# Patient Record
Sex: MALE | Race: WHITE | NOT HISPANIC OR LATINO | Employment: OTHER
[De-identification: names, ages, dates, MRNs, and addresses within clinical notes are randomized per-mention and may not be internally consistent; named-entity substitution may affect disease eponyms.]

---

## 2017-02-12 ENCOUNTER — HISTORIC RESULTS (OUTPATIENT)
Dept: ADMINISTRATIVE | Age: 52
End: 2017-02-12

## 2017-02-23 ENCOUNTER — HISTORIC RESULTS (OUTPATIENT)
Dept: ADMINISTRATIVE | Age: 52
End: 2017-02-23

## 2019-11-20 ENCOUNTER — OFFICE VISIT (OUTPATIENT)
Dept: ORTHOPEDICS | Facility: CLINIC | Age: 54
End: 2019-11-20
Payer: COMMERCIAL

## 2019-11-20 ENCOUNTER — ANCILLARY PROCEDURE (OUTPATIENT)
Dept: GENERAL RADIOLOGY | Facility: CLINIC | Age: 54
End: 2019-11-20
Attending: PREVENTIVE MEDICINE
Payer: COMMERCIAL

## 2019-11-20 ENCOUNTER — TELEPHONE (OUTPATIENT)
Dept: ORTHOPEDICS | Facility: CLINIC | Age: 54
End: 2019-11-20

## 2019-11-20 VITALS
SYSTOLIC BLOOD PRESSURE: 135 MMHG | WEIGHT: 315 LBS | HEART RATE: 52 BPM | BODY MASS INDEX: 44.1 KG/M2 | HEIGHT: 71 IN | DIASTOLIC BLOOD PRESSURE: 92 MMHG

## 2019-11-20 DIAGNOSIS — E66.01 MORBID OBESITY (H): ICD-10-CM

## 2019-11-20 DIAGNOSIS — M17.12 PRIMARY OSTEOARTHRITIS OF LEFT KNEE: ICD-10-CM

## 2019-11-20 DIAGNOSIS — M17.12 PRIMARY OSTEOARTHRITIS OF LEFT KNEE: Primary | ICD-10-CM

## 2019-11-20 PROCEDURE — 99207 ZZC NO CHARGE LOS: CPT | Performed by: PREVENTIVE MEDICINE

## 2019-11-20 PROCEDURE — 73562 X-RAY EXAM OF KNEE 3: CPT | Mod: LT | Performed by: RADIOLOGY

## 2019-11-20 PROCEDURE — 20611 DRAIN/INJ JOINT/BURSA W/US: CPT | Mod: LT | Performed by: PREVENTIVE MEDICINE

## 2019-11-20 RX ORDER — TRIAMCINOLONE ACETONIDE 40 MG/ML
40 INJECTION, SUSPENSION INTRA-ARTICULAR; INTRAMUSCULAR
Status: SHIPPED | OUTPATIENT
Start: 2019-11-20

## 2019-11-20 RX ADMIN — TRIAMCINOLONE ACETONIDE 40 MG: 40 INJECTION, SUSPENSION INTRA-ARTICULAR; INTRAMUSCULAR at 08:24

## 2019-11-20 ASSESSMENT — MIFFLIN-ST. JEOR: SCORE: 2431.11

## 2019-11-20 NOTE — PROGRESS NOTES
"HISTORY OF PRESENT ILLNESS  Mr. Hahn is a pleasant 53 year old year old male who presents to clinic today with ongoing left knee pain  Arie explains that he has had worsened knee pain over the past few months  Had arthroscopic surgery a few years ago, and his knee has bothered him minimally until recently  Location: left knee  Quality:  achy pain    Severity: 7/10 at worst    Duration: see above  Timing: occurs intermittently  Modifying factors:  resting and non-use makes it better, movement and use makes it worse  Associated signs & symptoms: swelling  Exercise: lifting weights and cardiovascular on machine, swimming, bike  Additional history: as documented    MEDICAL HISTORY  Patient Active Problem List   Diagnosis     ACL tear       Current Outpatient Medications   Medication Sig Dispense Refill     ATENOLOL 50 MG OR TABS 1 tab twice daily       diclofenac (VOLTAREN) 1 % GEL Place onto the skin 4 times daily Apply to left knee 4 times daily as needed, wash hands after application. 1 Tube 1     DIGOXIN 0.25 MG OR TABS 1 TABLET DAILY       VASOTEC 5 MG OR TABS 1 tab twice daily         No Known Allergies    Family History   Problem Relation Age of Onset     Diabetes Mother      Hypertension Mother        Additional medical/Social/Surgical histories reviewed in [facility] and updated as appropriate.     REVIEW OF SYSTEMS (11/20/2019)  10 point ROS of systems including Constitutional, Eyes, Respiratory, Cardiovascular, Gastroenterology, Genitourinary, Integumentary, Musculoskeletal, Psychiatric were all negative except for pertinent positives noted in my HPI.     PHYSICAL EXAM  Vitals:    11/20/19 0730   BP: (!) 135/92   Pulse: 52   Weight: (!) 157.4 kg (347 lb)   Height: 1.803 m (5' 11\")     Vital Signs: BP (!) 135/92   Pulse 52   Ht 1.803 m (5' 11\")   Wt (!) 157.4 kg (347 lb)   BMI 48.40 kg/m   Patient declined being weighed. Body mass index is 48.4 kg/m .    General  - normal appearance, in no obvious distress, " overweight  CV  - normal popliteal pulse  Pulm  - normal respiratory pattern, non-labored  Musculoskeletal - left knee  - stance: normal gait without limp, no obvious leg length discrepancy  - inspection: trace effusion, no ecchymosis, normal muscle tone, normal bone and joint alignment, no obvious deformity  - palpation: medial joint line tenderness, patella and patellar tendon non-tender, normal popliteal pulse  - ROM: 135 degrees flexion, -5 degrees extension, pain at terminal extension passively  - strength: 5/5 in flexion, 5/5 in extension  - neuro: no sensory or motor deficit  - special tests:  (-) Lachman  (-) anterior drawer  (-) posterior drawer  (-) pivot shift  (+) Debra  (-) varus at 0 and 30 degrees flexion  (-) valgus at 0 and 30 degrees flexion  (+) Bebo s compression test  (-) patellar apprehension  Neuro  - no sensory or motor deficit, grossly normal coordination, normal muscle tone  Skin  - no ecchymosis, erythema, warmth, or induration, no obvious rash  Psych  - interactive, appropriate, normal mood and affect  ASSESSMENT & PLAN  52 yo male with left knee djd, worse  Reviewed xrays: shows djd  Given knee injection  Consider HL injections   HEP given  F/u for HL injections    Kee Hood MD, CAQSM    PROCEDURE:            Left Knee joint injection   The patient was apprised of the risks and the benefits of the procedure and consented and a written consent was signed.   The patient s  KNEE was sterilely prepped with chloraprep.   40 mg of triamcinolone suspension was drawn up into a 5 mL syringe with 2 mL of 1% lidocaine  The patient was injected with a 3.5-inch 22-gauge needle at the_superiorlateral aspect of their knee joint  There were no complications. The patient tolerated the procedure well. There was minimal bleeding.   The patient was instructed to ice the knee upon leaving clinic and refrain from overuse over the next 3 days.   The patient was instructed to go to the emergency room  with any usual pain, swelling, or redness occurred in the injected area.   The patient was given a followup appointment to evaluate response to the injection to their increased range of motion and reduction of pain.  Follow up PRN  Dr Kee Hood

## 2019-11-20 NOTE — LETTER
"    11/20/2019         RE: Arie Hahn JrMohsen  [address] [address] [facility] [place] [address]        Dear Colleague,    Thank you for referring your patient, Arie Hahn Jr., to the [facility]. Please see a copy of my visit note below.    NEW PATIENT INTAKE QUESTIONNAIRE  [facility] 11/20/2019      Arie Hahn JrMohsen's chief complaint for this visit includes:  Chief Complaint   Patient presents with     Knee Pain     Left knee pain, discuss possible left knee injection     PCP: System, Provider Not In    Referring Provider:  No referring provider defined for this encounter.    BP (!) 135/92   Pulse 52   Ht 1.803 m (5' 11\")   Wt (!) 157.4 kg (347 lb)   BMI 48.40 kg/m     Data Unavailable         Reason for Visit:    What part of your body is injured / painful?  left knee    What caused the injury /pain? No inciting event     How long ago did your injury occur or pain begin? several years ago    What are your most bothersome symptoms? Pain, Clicking, popping and Inability to perform ADLs    How would you characterize your symptom? aching and stabbing    What makes your symptoms better? Rest    What makes your symptoms worse? Walking and Movement    Have you been previously seen for this problem? Yes, 2014    Medical History:    Medical History: Previous bilateral knee scopes     Have you had surgery on this body part before? Yes Surgical Procedure:    Medications: reviewed     Allergies: No known drug allergies      Review of Systems:    Have you recently had a a fever, chills, weight loss? No    Do you have any vision problems? No    Do you have any chest pain or edema? No    Do you have any shortness of breath or wheezing?  No    Do you have stomach problems? No    Do you have any numbness or focal weakness? No    Do you have diabetes? No    Do you have problems with bleeding or clotting? No    Do you have an rashes or other skin lesions? No    Large Joint " Injection/Arthocentesis: L knee joint  Date/Time: 11/20/2019 8:24 AM  Performed by: Kee Hood MD  Authorized by: Kee Hood MD     Needle Size:  22 G  Guidance: ultrasound    Location:  Knee      Medications:  40 mg triamcinolone 40 MG/ML  Consent Given by:  Patient   NDC: [address]-8          HISTORY OF PRESENT ILLNESS  Mr. Hahn is a pleasant 53 year old year old male who presents to clinic today with ongoing left knee pain  Arie explains that he has had worsened knee pain over the past few months  Had arthroscopic surgery a few years ago, and his knee has bothered him minimally until recently  Location: left knee  Quality:  achy pain    Severity: 7/10 at worst    Duration: see above  Timing: occurs intermittently  Modifying factors:  resting and non-use makes it better, movement and use makes it worse  Associated signs & symptoms: swelling  Exercise: lifting weights and cardiovascular on machine, swimming, bike  Additional history: as documented    MEDICAL HISTORY  Patient Active Problem List   Diagnosis     ACL tear       Current Outpatient Medications   Medication Sig Dispense Refill     ATENOLOL 50 MG OR TABS 1 tab twice daily       diclofenac (VOLTAREN) 1 % GEL Place onto the skin 4 times daily Apply to left knee 4 times daily as needed, wash hands after application. 1 Tube 1     DIGOXIN 0.25 MG OR TABS 1 TABLET DAILY       VASOTEC 5 MG OR TABS 1 tab twice daily         No Known Allergies    Family History   Problem Relation Age of Onset     Diabetes Mother      Hypertension Mother        Additional medical/Social/Surgical histories reviewed in [facility] and updated as appropriate.     REVIEW OF SYSTEMS (11/20/2019)  10 point ROS of systems including Constitutional, Eyes, Respiratory, Cardiovascular, Gastroenterology, Genitourinary, Integumentary, Musculoskeletal, Psychiatric were all negative except for pertinent positives noted in my HPI.     PHYSICAL EXAM  Vitals:    11/20/19 0730  "  BP: (!) 135/92   Pulse: 52   Weight: (!) 157.4 kg (347 lb)   Height: 1.803 m (5' 11\")     Vital Signs: BP (!) 135/92   Pulse 52   Ht 1.803 m (5' 11\")   Wt (!) 157.4 kg (347 lb)   BMI 48.40 kg/m    Patient declined being weighed. Body mass index is 48.4 kg/m .    General  - normal appearance, in no obvious distress, overweight  CV  - normal popliteal pulse  Pulm  - normal respiratory pattern, non-labored  Musculoskeletal - left knee  - stance: normal gait without limp, no obvious leg length discrepancy  - inspection: trace effusion, no ecchymosis, normal muscle tone, normal bone and joint alignment, no obvious deformity  - palpation: medial joint line tenderness, patella and patellar tendon non-tender, normal popliteal pulse  - ROM: 135 degrees flexion, -5 degrees extension, pain at terminal extension passively  - strength: 5/5 in flexion, 5/5 in extension  - neuro: no sensory or motor deficit  - special tests:  (-) Lachman  (-) anterior drawer  (-) posterior drawer  (-) pivot shift  (+) Debra  (-) varus at 0 and 30 degrees flexion  (-) valgus at 0 and 30 degrees flexion  (+) Bebo s compression test  (-) patellar apprehension  Neuro  - no sensory or motor deficit, grossly normal coordination, normal muscle tone  Skin  - no ecchymosis, erythema, warmth, or induration, no obvious rash  Psych  - interactive, appropriate, normal mood and affect  ASSESSMENT & PLAN  54 yo male with left knee djd, worse  Reviewed xrays: shows djd  Given knee injection  Consider HL injections   HEP given  F/u for HL injections    Kee Hood MD, CAQSM    PROCEDURE:            Left Knee joint injection   The patient was apprised of the risks and the benefits of the procedure and consented and a written consent was signed.   The patient s  KNEE was sterilely prepped with chloraprep.   40 mg of triamcinolone suspension was drawn up into a 5 mL syringe with 2 mL of 1% lidocaine  The patient was injected with a 3.5-inch 22-gauge " needle at the_superiorlateral aspect of their knee joint  There were no complications. The patient tolerated the procedure well. There was minimal bleeding.   The patient was instructed to ice the knee upon leaving clinic and refrain from overuse over the next 3 days.   The patient was instructed to go to the emergency room with any usual pain, swelling, or redness occurred in the injected area.   The patient was given a followup appointment to evaluate response to the injection to their increased range of motion and reduction of pain.  Follow up PRN  Dr Kee Hood    Again, thank you for allowing me to participate in the care of your patient.        Sincerely,        Kee Hood MD

## 2019-11-20 NOTE — PROGRESS NOTES
"NEW PATIENT INTAKE QUESTIONNAIRE  [facility] 11/20/2019      Arie Hahn Jr.'s chief complaint for this visit includes:  Chief Complaint   Patient presents with     Knee Pain     Left knee pain, discuss possible left knee injection     PCP: System, Provider Not In    Referring Provider:  No referring provider defined for this encounter.    BP (!) 135/92   Pulse 52   Ht 1.803 m (5' 11\")   Wt (!) 157.4 kg (347 lb)   BMI 48.40 kg/m    Data Unavailable         Reason for Visit:    What part of your body is injured / painful?  left knee    What caused the injury /pain? No inciting event     How long ago did your injury occur or pain begin? several years ago    What are your most bothersome symptoms? Pain, Clicking, popping and Inability to perform ADLs    How would you characterize your symptom? aching and stabbing    What makes your symptoms better? Rest    What makes your symptoms worse? Walking and Movement    Have you been previously seen for this problem? Yes, 2014    Medical History:    Medical History: Previous bilateral knee scopes     Have you had surgery on this body part before? Yes Surgical Procedure:    Medications: reviewed     Allergies: No known drug allergies      Review of Systems:    Have you recently had a a fever, chills, weight loss? No    Do you have any vision problems? No    Do you have any chest pain or edema? No    Do you have any shortness of breath or wheezing?  No    Do you have stomach problems? No    Do you have any numbness or focal weakness? No    Do you have diabetes? No    Do you have problems with bleeding or clotting? No    Do you have an rashes or other skin lesions? No    Large Joint Injection/Arthocentesis: L knee joint  Date/Time: 11/20/2019 8:24 AM  Performed by: Kee Hood MD  Authorized by: Kee Hood MD     Needle Size:  22 G  Guidance: ultrasound    Location:  Knee      Medications:  40 mg triamcinolone 40 MG/ML  Consent Given " by:  Patient   NDC: [address]-8

## 2019-11-20 NOTE — PATIENT INSTRUCTIONS
Thanks for coming today.  Ortho/Sports Medicine Clinic  [address] [place] [address]    To schedule future appointments in Ortho Clinic, you may call [phone].    To schedule ordered imaging by your provider:   Call Central Imaging [phone]    To schedule an injection ordered by your provider:  Call Central Imaging Injection scheduling line: [phone]  [email or web address] available online at:  [email or web address]    Please call if any further questions or concerns ([phone]).  Clinic hours 8 am to 5 pm.    Return to clinic (call) if symptoms worsen or fail to improve.

## 2019-11-25 NOTE — TELEPHONE ENCOUNTER
Financial Counselor Review for Hylan (gel) injection:    Procedure to be performed (include CPT code):Yes DRAIN/INJECT LARGE JOINT/BURSA - CPT: 56568    Diagnosis code (include ICD-10 code): Left knee degenerative osteoarthritis M17.12    Have they tried and failed any gel injections already? No  Medication order (include J code):Yes     Synvisc One (Hylan G-F 20) -   Euflexxa -   Supartz -   Gel One -   Monovisc -  ([employer] 2019 - no PA needed)  Orthovisc -  ([employer] 2019 - no PA needed)    Coverage and patient financial responsibility information:YES    Does patient need to be contacted by Financial Counselor:YES, only if Pt is required to pay anything    Note: Do not use abbreviations and route encounter to [facility] [place] [[id number]]    --------------    FYI: If Patient has [employer] insurance, for 2019 year patient must try and fail Monovisc  or Orthovisc  before a PA can be done on Synvisc One, Euflexxa, Supartz, Gel One injections. No auth or pre-d is needed on Mono or Ortho injections and are a covered benefit under this patients plan.      
Per the [employer] Coverage policy. No PA Required. Only covered for Synvisc One and Euflexxa.    Coverage  Coverage for intra-articular hyaluronan is subject to the indications listed below, and per your plan documents.  No prior authorization is required when used in the following manner:  1. After failure to respond to both of the following conservative therapy approaches:  1. Simple analgesics such as NSAIDs unless there is a contraindication to use, and  2. Conservative nonpharmacologic therapy such as strengthening, low-impact aerobic exercises, and neuromuscular education.  2. Repeat courses in patients with a documented response to previous intra-articular viscosupplementation and with courses spaced at least six months apart.  Use in joints other than the knee is considered investigational.    
I concur with the Admission Order and I certify that services are provided in accordance with Section 42 CFR § 412.3

## 2020-10-27 ENCOUNTER — OFFICE VISIT (OUTPATIENT)
Dept: ORTHOPEDICS | Facility: CLINIC | Age: 55
End: 2020-10-27
Payer: COMMERCIAL

## 2020-10-27 VITALS
SYSTOLIC BLOOD PRESSURE: 159 MMHG | BODY MASS INDEX: 48.4 KG/M2 | HEIGHT: 71 IN | DIASTOLIC BLOOD PRESSURE: 86 MMHG | HEART RATE: 76 BPM

## 2020-10-27 DIAGNOSIS — M17.12 ARTHRITIS OF LEFT KNEE: Primary | ICD-10-CM

## 2020-10-27 PROCEDURE — 20610 DRAIN/INJ JOINT/BURSA W/O US: CPT | Mod: LT | Performed by: PREVENTIVE MEDICINE

## 2020-10-27 PROCEDURE — 99207 PR DROP WITH A PROCEDURE: CPT | Performed by: PREVENTIVE MEDICINE

## 2020-10-27 RX ORDER — METHYLPREDNISOLONE ACETATE 40 MG/ML
40 INJECTION, SUSPENSION INTRA-ARTICULAR; INTRALESIONAL; INTRAMUSCULAR; SOFT TISSUE
Status: SHIPPED | OUTPATIENT
Start: 2020-10-27

## 2020-10-27 RX ADMIN — METHYLPREDNISOLONE ACETATE 40 MG: 40 INJECTION, SUSPENSION INTRA-ARTICULAR; INTRALESIONAL; INTRAMUSCULAR; SOFT TISSUE at 08:40

## 2020-10-27 ASSESSMENT — PAIN SCALES - GENERAL: PAINLEVEL: MILD PAIN (2)

## 2020-10-27 NOTE — PROGRESS NOTES
Arie presents for left knee arthritis pain and requesting another injection  Will restart physical therapy  Have discussed HL injections for his knee and will try to obtain approval for HL injections  Cont. voltaren gel PRN  Cont. To exercise and lose weight  Diagnosis: left knee arthritis    PROCEDURE:        Left     Knee joint injection   The patient was apprised of the risks and the benefits of the procedure and consented and a written consent was signed.   The patient s  KNEE was sterilely prepped with chloraprep.   40 mg of depo suspension was drawn up into a 5 mL syringe with 4 mL of 1% lidocaine  The patient was injected into the knee with a 1.5-inch 22-gauge needle at the_superiorlateral aspect of their knee joint  There were no complications. The patient tolerated the procedure well. There was minimal bleeding.   The patient was instructed to ice the knee upon leaving clinic and refrain from overuse over the next 3 days.   The patient was instructed to go to the emergency room with any usual pain, swelling, or redness occurred in the injected area.   The patient was given a followup appointment to evaluate response to the injection to their increased range of motion and reduction of pain.  Follow up in 1-2 months for possible HL injection    Dr Kee Hood

## 2020-10-27 NOTE — PATIENT INSTRUCTIONS
Thanks for coming today.  Ortho/Sports Medicine Clinic  [address] [place] [address]    To schedule future appointments in Ortho Clinic, you may call [phone].    To schedule ordered imaging by your provider:   Call Central Imaging [phone]    To schedule an injection ordered by your provider:  Call Central Imaging Injection scheduling line: [phone]  [email or web address] available online at:  [email or web address]    Please call if any further questions or concerns ([phone]).  Clinic hours 8 am to 5 pm.    Return to clinic (call) if symptoms worsen or fail to improve.

## 2020-10-27 NOTE — PROGRESS NOTES
Large Joint Injection/Arthocentesis: L knee joint    Date/Time: 10/27/2020 8:40 AM  Performed by: Kee Hood MD  Authorized by: Kee Hood MD     Indications:  Pain  Needle Size:  22 G  Guidance: landmark guided    Approach:  Lateral  Location:  Knee      Medications:  40 mg methylPREDNISolone 40 MG/ML  Procedure discussed: discussed risks, benefits, and alternatives    Consent Given by:  Patient  Timeout: timeout called immediately prior to procedure    Prep: patient was prepped and draped in usual sterile fashion     NDC: 7025-9603-11

## 2020-10-27 NOTE — LETTER
10/27/2020         RE: Arie Hahn Jr.  [address] [address]        Dear Colleague,    Thank you for referring your patient, Arie Hahn Jr., to the [facility] [place]. Please see a copy of my visit note below.    Arie presents for left knee arthritis pain and requesting another injection  Will restart physical therapy  Have discussed HL injections for his knee and will try to obtain approval for HL injections  Cont. voltaren gel PRN  Cont. To exercise and lose weight  Diagnosis: left knee arthritis    PROCEDURE:        Left     Knee joint injection   The patient was apprised of the risks and the benefits of the procedure and consented and a written consent was signed.   The patient s  KNEE was sterilely prepped with chloraprep.   40 mg of depo suspension was drawn up into a 5 mL syringe with 4 mL of 1% lidocaine  The patient was injected into the knee with a 1.5-inch 22-gauge needle at the_superiorlateral aspect of their knee joint  There were no complications. The patient tolerated the procedure well. There was minimal bleeding.   The patient was instructed to ice the knee upon leaving clinic and refrain from overuse over the next 3 days.   The patient was instructed to go to the emergency room with any usual pain, swelling, or redness occurred in the injected area.   The patient was given a followup appointment to evaluate response to the injection to their increased range of motion and reduction of pain.  Follow up in 1-2 months for possible HL injection    Dr Kee Hood    Large Joint Injection/Arthocentesis: L knee joint    Date/Time: 10/27/2020 8:40 AM  Performed by: Kee Hood MD  Authorized by: Kee Hood MD     Indications:  Pain  Needle Size:  22 G  Guidance: landmark guided    Approach:  Lateral  Location:  Knee      Medications:  40 mg methylPREDNISolone 40 MG/ML  Procedure discussed: discussed risks,  benefits, and alternatives    Consent Given by:  Patient  Timeout: timeout called immediately prior to procedure    Prep: patient was prepped and draped in usual sterile fashion     NDC: 3138-5375-53          Again, thank you for allowing me to participate in the care of your patient.        Sincerely,        Kee Hood MD

## 2020-10-29 ENCOUNTER — DOCUMENTATION ONLY (OUTPATIENT)
Dept: ORTHOPEDICS | Facility: CLINIC | Age: 55
End: 2020-10-29

## 2020-10-29 NOTE — PROGRESS NOTES
Financial Counselor Review for Hylan (gel) injection:    Procedure to be performed (include CPT code):Yes DRAIN/INJECT LARGE JOINT/BURSA - CPT: 76227    Diagnosis code (include ICD-10 code): Left knee degenerative osteoarthritis M17.12    Have they tried and failed any gel injections already? No    Medication order (include J code):Yes     Synvisc One (Hylan G-F 20) -   Euflexxa -   Supartz -   Gel One -   Monovisc -  (Humana 2019 - no PA needed)  Orthovisc -  (Humana 2019 - no PA needed)    Coverage and patient financial responsibility information:YES    Does patient need to be contacted by Financial Counselor:YES, only if Pt is required to pay anything    Note: Do not use abbreviations and route encounter to [facility] [place] [[id number]]    --------------    FYI: If Patient has [employer] insurance, for 2019 year patient must try and fail Monovisc  or Orthovisc  before a PA can be done on Synvisc One, Euflexxa, Supartz, Gel One injections. No auth or pre-d is needed on Mono or Ortho injections and are a covered benefit under this patients plan.

## 2020-11-02 NOTE — PROGRESS NOTES
This patient is good to go for a euflexxa injection through his [employer] insurance.    Good to go for scheduling.

## 2020-11-02 NOTE — TELEPHONE ENCOUNTER
11/2 Called and spoke to patient. He is currently scheduled for all 3 injections.     Maris Castellanos   Procedure    Ortho/Sports Med/Pod/Ent/Eye  [facility]   [phone]

## 2020-11-03 ENCOUNTER — THERAPY VISIT (OUTPATIENT)
Dept: PHYSICAL THERAPY | Facility: CLINIC | Age: 55
End: 2020-11-03
Payer: COMMERCIAL

## 2020-11-03 DIAGNOSIS — M25.562 LEFT KNEE PAIN: Primary | ICD-10-CM

## 2020-11-03 PROCEDURE — 97110 THERAPEUTIC EXERCISES: CPT | Mod: GP | Performed by: PHYSICAL THERAPIST

## 2020-11-03 PROCEDURE — 97161 PT EVAL LOW COMPLEX 20 MIN: CPT | Mod: GP | Performed by: PHYSICAL THERAPIST

## 2020-11-03 ASSESSMENT — ACTIVITIES OF DAILY LIVING (ADL)
GO UP STAIRS: ACTIVITY IS MINIMALLY DIFFICULT
HOW_WOULD_YOU_RATE_THE_OVERALL_FUNCTION_OF_YOUR_KNEE_DURING_YOUR_USUAL_DAILY_ACTIVITIES?: ABNORMAL
STAND: ACTIVITY IS SOMEWHAT DIFFICULT
WALK: ACTIVITY IS MINIMALLY DIFFICULT
GO DOWN STAIRS: ACTIVITY IS FAIRLY DIFFICULT
KNEE_ACTIVITY_OF_DAILY_LIVING_SCORE: 55.71
SQUAT: ACTIVITY IS FAIRLY DIFFICULT
WEAKNESS: THE SYMPTOM AFFECTS MY ACTIVITY SLIGHTLY
KNEEL ON THE FRONT OF YOUR KNEE: ACTIVITY IS VERY DIFFICULT
LIMPING: THE SYMPTOM AFFECTS MY ACTIVITY SEVERELY
RISE FROM A CHAIR: ACTIVITY IS NOT DIFFICULT
RAW_SCORE: 39
HOW_WOULD_YOU_RATE_THE_CURRENT_FUNCTION_OF_YOUR_KNEE_DURING_YOUR_USUAL_DAILY_ACTIVITIES_ON_A_SCALE_FROM_0_TO_100_WITH_100_BEING_YOUR_LEVEL_OF_KNEE_FUNCTION_PRIOR_TO_YOUR_INJURY_AND_0_BEING_THE_INABILITY_TO_PERFORM_ANY_OF_YOUR_USUAL_DAILY_ACTIVITIES?: 50
GIVING WAY, BUCKLING OR SHIFTING OF KNEE: THE SYMPTOM AFFECTS MY ACTIVITY MODERATELY
STIFFNESS: THE SYMPTOM AFFECTS MY ACTIVITY MODERATELY
PAIN: THE SYMPTOM AFFECTS MY ACTIVITY SLIGHTLY
KNEE_ACTIVITY_OF_DAILY_LIVING_SUM: 39
SIT WITH YOUR KNEE BENT: ACTIVITY IS NOT DIFFICULT
AS_A_RESULT_OF_YOUR_KNEE_INJURY,_HOW_WOULD_YOU_RATE_YOUR_CURRENT_LEVEL_OF_DAILY_ACTIVITY?: ABNORMAL
SWELLING: THE SYMPTOM AFFECTS MY ACTIVITY MODERATELY

## 2020-11-03 NOTE — LETTER
[facility] PHYSICAL THERAPY  [address]  [facility] [address]  [phone]    November 3, 2020    Re: Arie Hahn Jr.   :   1965  MRN:  [id number]   REFERRING PHYSICIAN:   Kee Hood    [facility] PHYSICAL THERAPY    Date of Initial Evaluation:  11/3/20  Visits:  Rxs Used: 1  Reason for Referral:  Left knee pain    EVALUATION SUMMARY    [facility] Initial Evaluation  Subjective:  The history is provided by the patient. No  was used.   Patient Health History  Arie Hahn Jr. being seen for Left knee.     Date of Onset: Chronic.   Problem occurred: OA   Pain is reported as 4/10 (up to 8/10) on pain scale.  General health as reported by patient is good.  Pertinent medical history includes: high blood pressure and overweight.   Red flags:  None as reported by patient.  Medical allergies: none.   Surgeries include:  Orthopedic surgery. Other surgery history details: Bilateral Knees.    Current medications:  High blood pressure medication.    Current occupation is [employer].   Primary job tasks include:  Computer work, driving and prolonged sitting.                  Therapist Generated HPI Evaluation  Problem details: Left knee pain has been chronic with a knee scope completed a few years ago.  Pain has increased over the past year. Most recently had a steroid injection 10/27 which has been helpful.  Is scheduled to complete 3 HL injections over the next month..         Type of problem:  Left knee.    This is a chronic condition.  Condition occurred with:  Degenerative joint disease.  Where condition occurred: for unknown reasons.  Patient reports pain:  Medial and anterior.  Pain is described as aching and sharp and is constant.  Pain is the same all the time.    Symptoms are exacerbated by bending/squatting, descending stairs, kneeling, standing, walking and weight bearing  and relieved  by other (injections).  Special tests included:  X-ray.  There was none improvement following previous treatment.  Restrictions due to condition include:  Working in normal job without restrictions.  Barriers include:  None as reported by patient.      Objective:          Hip Evaluation  Hip Strength:    Flexion:   Left: 4/5   Pain:  Right: 5/5   Pain:  Extension:  Left: 4-/5  Pain:Right: 5/5    Pain:    Abduction:  Left: 4/5     Pain:Right: 5/5    Pain:    Knee Evaluation:  ROM:    AROM  Hyperextension: Left:     Right: 15  Extension: Left: 5    Right:   Flexion: Left: 120    Right: 120    Strength:   Extension:  Left: 4-/5   Pain:      Right: 5/5   Pain:  Flexion:  Left: 4/5   Pain:      Right: 5/5   Pain:    Quad Set Left:  Fair and delayed    Pain: ++   Quad Set Right: Good    Pain:      Assessment/Plan:    Patient is a 54 year old male with left side knee complaints.    Patient has the following significant findings with corresponding treatment plan.                Diagnosis 1:  Left Knee pain  Pain -  manual therapy, self management and education  Decreased ROM/flexibility - manual therapy and therapeutic exercise  Decreased strength - therapeutic exercise and therapeutic activities  Impaired muscle performance - neuro re-education  Decreased function - therapeutic activities    Previous and current functional limitations:  (See Goal Flow Sheet for this information)    Short term and Long term goals: (See Goal Flow Sheet for this information)     Communication ability:  Patient appears to be able to clearly communicate and understand verbal and written communication and follow directions correctly.  Treatment Explanation - The following has been discussed with the patient:   RX ordered/plan of care  Anticipated outcomes  Possible risks and side effects  This patient would benefit from PT intervention to resume normal activities.   Rehab potential is good.    Frequency:  1 X week, once daily  Duration:  for 8  weeks  Discharge Plan:  Achieve all LTG.  Independent in home treatment program.  Reach maximal therapeutic benefit.          Thank you for your referral.    INQUIRIES  Therapist: Venita Damico DPT   [facility] PHYSICAL THERAPY  [address]  [facility] [address]  Phone: [phone]  Fax: [phone]

## 2020-11-03 NOTE — PROGRESS NOTES
[facility] Initial Evaluation  Subjective:  The history is provided by the patient. No  was used.   Patient Health History  Arie Hahn Jr. being seen for Left knee.     Date of Onset: Chronic.   Problem occurred: OA   Pain is reported as 4/10 (up to 8/10) on pain scale.  General health as reported by patient is good.  Pertinent medical history includes: high blood pressure and overweight.   Red flags:  None as reported by patient.  Medical allergies: none.   Surgeries include:  Orthopedic surgery. Other surgery history details: Bilateral Knees.    Current medications:  High blood pressure medication.    Current occupation is [employer].   Primary job tasks include:  Computer work, driving and prolonged sitting.                  Therapist Generated HPI Evaluation  Problem details: Left knee pain has been chronic with a knee scope completed a few years ago.  Pain has increased over the past year. Most recently had a steroid injection 10/27 which has been helpful.  Is scheduled to complete 3 HL injections over the next month..         Type of problem:  Left knee.    This is a chronic condition.  Condition occurred with:  Degenerative joint disease.  Where condition occurred: for unknown reasons.  Patient reports pain:  Medial and anterior.  Pain is described as aching and sharp and is constant.  Pain is the same all the time.    Symptoms are exacerbated by bending/squatting, descending stairs, kneeling, standing, walking and weight bearing  and relieved by other (injections).  Special tests included:  X-ray.  There was none improvement following previous treatment.  Restrictions due to condition include:  Working in normal job without restrictions.  Barriers include:  None as reported by patient.                        Objective:  System                                           Hip Evaluation    Hip Strength:    Flexion:   Left: 4/5   Pain:  Right: 5/5    Pain:                    Extension:  Left: 4-/5  Pain:Right: 5/5    Pain:    Abduction:  Left: 4/5     Pain:Right: 5/5    Pain:                           Knee Evaluation:  ROM:    AROM    Hyperextension: Left:     Right: 15  Extension: Left: 5    Right:   Flexion: Left: 120    Right: 120        Strength:     Extension:  Left: 4-/5   Pain:      Right: 5/5   Pain:  Flexion:  Left: 4/5   Pain:      Right: 5/5   Pain:    Quad Set Left:  Fair and delayed    Pain: ++   Quad Set Right: Good    Pain:                  General     ROS    Assessment/Plan:    Patient is a 54 year old male with left side knee complaints.    Patient has the following significant findings with corresponding treatment plan.                Diagnosis 1:  Left Knee pain  Pain -  manual therapy, self management and education  Decreased ROM/flexibility - manual therapy and therapeutic exercise  Decreased strength - therapeutic exercise and therapeutic activities  Impaired muscle performance - neuro re-education  Decreased function - therapeutic activities        Previous and current functional limitations:  (See Goal Flow Sheet for this information)    Short term and Long term goals: (See Goal Flow Sheet for this information)     Communication ability:  Patient appears to be able to clearly communicate and understand verbal and written communication and follow directions correctly.  Treatment Explanation - The following has been discussed with the patient:   RX ordered/plan of care  Anticipated outcomes  Possible risks and side effects  This patient would benefit from PT intervention to resume normal activities.   Rehab potential is good.    Frequency:  1 X week, once daily  Duration:  for 8 weeks  Discharge Plan:  Achieve all LTG.  Independent in home treatment program.  Reach maximal therapeutic benefit.    Please refer to the daily flowsheet for treatment today, total treatment time and time spent performing 1:1 timed codes.

## 2020-11-18 ENCOUNTER — OFFICE VISIT (OUTPATIENT)
Dept: ORTHOPEDICS | Facility: CLINIC | Age: 55
End: 2020-11-18
Payer: COMMERCIAL

## 2020-11-18 DIAGNOSIS — E66.01 MORBID OBESITY (H): ICD-10-CM

## 2020-11-18 DIAGNOSIS — M17.12 ARTHRITIS OF LEFT KNEE: Primary | ICD-10-CM

## 2020-11-18 PROCEDURE — 20611 DRAIN/INJ JOINT/BURSA W/US: CPT | Mod: LT | Performed by: PREVENTIVE MEDICINE

## 2020-11-18 PROCEDURE — 99207 PR DROP WITH A PROCEDURE: CPT | Performed by: PREVENTIVE MEDICINE

## 2020-11-18 RX ORDER — HYALURONATE SODIUM 10 MG/ML
20 SYRINGE (ML) INTRAARTICULAR
Status: SHIPPED | OUTPATIENT
Start: 2020-11-18

## 2020-11-18 RX ADMIN — Medication 20 MG: at 15:36

## 2020-11-18 ASSESSMENT — PAIN SCALES - GENERAL: PAINLEVEL: MODERATE PAIN (4)

## 2020-11-18 NOTE — PATIENT INSTRUCTIONS
Thanks for coming today.  Ortho/Sports Medicine Clinic  [address] [place] [address]    To schedule future appointments in Ortho Clinic, you may call [phone].    To schedule ordered imaging by your provider:   Call Central Imaging [phone]    To schedule an injection ordered by your provider:  Call Central Imaging Injection scheduling line: [phone]  App [employer] available online at:  [email or web address]    Please call if any further questions or concerns ([phone]).  Clinic hours 8 am to 5 pm.    Return to clinic (call) if symptoms worsen or fail to improve.

## 2020-11-18 NOTE — LETTER
11/18/2020         RE: Arie Hahn Jr.  [address] [facility] [address]        Dear Colleague,    Thank you for referring your patient, Arie Hahn Jr., to the [facility] SPORTS MEDICINE CLINIC [place]. Please see a copy of my visit note below.    Arie Hahn Jr.'s chief complaint for this visit includes:  Chief Complaint   Patient presents with     Procedure     Left knee Euflexxa #1     PCP: No Ref-Primary, Physician    Referring Provider:  No referring provider defined for this encounter.    There were no vitals taken for this visit.  Moderate Pain (4)     Do you need any medication refills at today's visit? No    Large Joint Injection/Arthocentesis: L knee joint    Date/Time: 11/18/2020 3:36 PM  Performed by: Kee Hood MD  Authorized by: Kee Hood MD     Indications:  Pain and osteoarthritis  Needle Size:  22 G  Guidance: ultrasound    Approach:  Lateral  Location:  Knee      Medications:  20 mg sodium hyaluronate 20 MG/2ML  Procedure discussed: discussed risks, benefits, and alternatives    Consent Given by:  Patient  Timeout: timeout called immediately prior to procedure    Prep: patient was prepped and draped in usual sterile fashion       Eufflexa: [id number]        Arie returns for euflexxa injection in left knee as previously planned  Diagnosis: left knee arthritis, obesity    Left Knee Injection - Ultrasound Guided  The patient was informed of the risks and the benefits of the procedure and a written consent was signed.  The patient s knee was prepped with chlorhexidine in sterile fashion.     Injection was performed using sterile technique.  Under ultrasound guidance a 1.5-inch 22-gauge needle was used to enter the superolateral aspect of the knee and inject euflexxa syringe.  Needle placement was visualized and documented with ultrasound.  Ultrasound visualization was necessary due to decreased joint space in the setting of  osteoarthritis and obesity.  Images were permanently stored for the patient's record.  There were no complications. The patient tolerated the procedure well. There was negligible bleeding.   The patient was instructed to ice the knee upon leaving clinic and refrain from overuse over the next 3 days.   The patient was instructed to call or go to the emergency room with any unusual pain, swelling, redness, or if otherwise concerned.  F/u for euflexxa injections        Again, thank you for allowing me to participate in the care of your patient.        Sincerely,        Kee Hood MD

## 2020-11-23 ENCOUNTER — OFFICE VISIT (OUTPATIENT)
Dept: ORTHOPEDICS | Facility: CLINIC | Age: 55
End: 2020-11-23
Payer: COMMERCIAL

## 2020-11-23 DIAGNOSIS — M17.12 ARTHRITIS OF LEFT KNEE: Primary | ICD-10-CM

## 2020-11-23 PROCEDURE — 20611 DRAIN/INJ JOINT/BURSA W/US: CPT | Mod: LT | Performed by: PREVENTIVE MEDICINE

## 2020-11-23 PROCEDURE — 99207 PR DROP WITH A PROCEDURE: CPT | Performed by: PREVENTIVE MEDICINE

## 2020-11-23 RX ORDER — HYALURONATE SODIUM 10 MG/ML
20 SYRINGE (ML) INTRAARTICULAR
Status: SHIPPED | OUTPATIENT
Start: 2020-11-23

## 2020-11-23 RX ADMIN — Medication 20 MG: at 13:26

## 2020-11-23 NOTE — PROGRESS NOTES
Arie returns for euflexxa injection in left knee as previously planned  Diagnosis: left knee arthritis, obesity    Left Knee Injection - Ultrasound Guided  The patient was informed of the risks and the benefits of the procedure and a written consent was signed.  The patient s knee was prepped with chlorhexidine in sterile fashion.     Injection was performed using sterile technique.  Under ultrasound guidance a 1.5-inch 22-gauge needle was used to enter the superolateral aspect of the knee and inject euflexxa syringe.  Needle placement was visualized and documented with ultrasound.  Ultrasound visualization was necessary due to decreased joint space in the setting of osteoarthritis and obesity.  Images were permanently stored for the patient's record.  There were no complications. The patient tolerated the procedure well. There was negligible bleeding.   The patient was instructed to ice the knee upon leaving clinic and refrain from overuse over the next 3 days.   The patient was instructed to call or go to the emergency room with any unusual pain, swelling, redness, or if otherwise concerned.  F/u for euflexxa injection

## 2020-11-23 NOTE — PROGRESS NOTES
Large Joint Injection/Arthocentesis: L knee joint    Date/Time: 11/23/2020 1:26 PM  Performed by: Kee Hood MD  Authorized by: Kee Hood MD     Indications:  Osteoarthritis  Needle Size:  22 G  Guidance: ultrasound    Approach:  Lateral  Location:  Knee      Medications:  20 mg sodium hyaluronate 20 MG/2ML  Procedure discussed: discussed risks, benefits, and alternatives    Consent Given by:  Patient  Timeout: timeout called immediately prior to procedure    Prep: patient was prepped and draped in usual sterile fashion       Eulexa: [id number]

## 2020-11-23 NOTE — LETTER
11/23/2020         RE: Arie Hahn Jr.  [address] [facility] [address]        Dear Colleague,    Thank you for referring your patient, Arie Hahn Jr., to the [facility] SPORTS MEDICINE CLINIC [place]. Please see a copy of my visit note below.    Arie returns for euflexxa injection in left knee as previously planned  Diagnosis: left knee arthritis, obesity    Left Knee Injection - Ultrasound Guided  The patient was informed of the risks and the benefits of the procedure and a written consent was signed.  The patient s knee was prepped with chlorhexidine in sterile fashion.     Injection was performed using sterile technique.  Under ultrasound guidance a 1.5-inch 22-gauge needle was used to enter the superolateral aspect of the knee and inject euflexxa syringe.  Needle placement was visualized and documented with ultrasound.  Ultrasound visualization was necessary due to decreased joint space in the setting of osteoarthritis and obesity.  Images were permanently stored for the patient's record.  There were no complications. The patient tolerated the procedure well. There was negligible bleeding.   The patient was instructed to ice the knee upon leaving clinic and refrain from overuse over the next 3 days.   The patient was instructed to call or go to the emergency room with any unusual pain, swelling, redness, or if otherwise concerned.  F/u for euflexxa injection      Large Joint Injection/Arthocentesis: L knee joint    Date/Time: 11/23/2020 1:26 PM  Performed by: Kee Hood MD  Authorized by: Kee Hood MD     Indications:  Osteoarthritis  Needle Size:  22 G  Guidance: ultrasound    Approach:  Lateral  Location:  Knee      Medications:  20 mg sodium hyaluronate 20 MG/2ML  Procedure discussed: discussed risks, benefits, and alternatives    Consent Given by:  Patient  Timeout: timeout called immediately prior to procedure    Prep: patient was prepped and  draped in usual sterile fashion       [facility]: [id number]              Again, thank you for allowing me to participate in the care of your patient.        Sincerely,        Kee Hood MD

## 2020-12-03 ENCOUNTER — OFFICE VISIT (OUTPATIENT)
Dept: ORTHOPEDICS | Facility: CLINIC | Age: 55
End: 2020-12-03
Payer: COMMERCIAL

## 2020-12-03 DIAGNOSIS — M17.12 ARTHRITIS OF LEFT KNEE: Primary | ICD-10-CM

## 2020-12-03 DIAGNOSIS — E66.01 MORBID OBESITY (H): ICD-10-CM

## 2020-12-03 PROCEDURE — 20611 DRAIN/INJ JOINT/BURSA W/US: CPT | Mod: LT | Performed by: PREVENTIVE MEDICINE

## 2020-12-03 PROCEDURE — 99207 PR DROP WITH A PROCEDURE: CPT | Performed by: PREVENTIVE MEDICINE

## 2020-12-03 RX ORDER — HYALURONATE SODIUM 10 MG/ML
20 SYRINGE (ML) INTRAARTICULAR
Status: SHIPPED | OUTPATIENT
Start: 2020-12-03

## 2020-12-03 RX ADMIN — Medication 20 MG: at 07:12

## 2020-12-03 NOTE — PROGRESS NOTES
Arie Hahn Jr.'s chief complaint for this visit includes:  Chief Complaint   Patient presents with     RECHECK     Follow up for Euflexxa #3     PCP: No Ref-Primary, Physician    Referring Provider:  No referring provider defined for this encounter.    There were no vitals taken for this visit.  Data Unavailable     Do you need any medication refills at today's visit? No    Large Joint Injection/Arthocentesis: L knee joint    Date/Time: 12/3/2020 7:12 AM  Performed by: Kee Hood MD  Authorized by: Kee Hood MD     Indications:  Pain and osteoarthritis  Needle Size:  22 G  Guidance: ultrasound    Approach:  Lateral  Location:  Knee      Medications:  20 mg sodium hyaluronate 20 MG/2ML  Procedure discussed: discussed risks, benefits, and alternatives    Consent Given by:  Patient  Timeout: timeout called immediately prior to procedure    Prep: patient was prepped and draped in usual sterile fashion       Eufflexa: [id number]

## 2020-12-03 NOTE — LETTER
12/3/2020         RE: Arie Hahn Jr.  [address] [facility] [address]        Dear Colleague,    Thank you for referring your patient, Arie Hahn Jr., to the [facility] SPORTS MEDICINE CLINIC [employer]. Please see a copy of my visit note below.    Arie Hahn Jr.'s chief complaint for this visit includes:  Chief Complaint   Patient presents with     RECHECK     Follow up for Euflexxa #3     PCP: No Ref-Primary, Physician    Referring Provider:  No referring provider defined for this encounter.    There were no vitals taken for this visit.  Data Unavailable     Do you need any medication refills at today's visit? No    Large Joint Injection/Arthocentesis: L knee joint    Date/Time: 12/3/2020 7:12 AM  Performed by: Kee Hood MD  Authorized by: Kee Hood MD     Indications:  Pain and osteoarthritis  Needle Size:  22 G  Guidance: ultrasound    Approach:  Lateral  Location:  Knee      Medications:  20 mg sodium hyaluronate 20 MG/2ML  Procedure discussed: discussed risks, benefits, and alternatives    Consent Given by:  Patient  Timeout: timeout called immediately prior to procedure    Prep: patient was prepped and draped in usual sterile fashion       Eufflexa: [id number]            Arie returns for euflexxa injection in left knee as previously planned  Diagnosis: left knee arthritis, obesity    Left Knee Injection - Ultrasound Guided  The patient was informed of the risks and the benefits of the procedure and a written consent was signed.  The patient s knee was prepped with chlorhexidine in sterile fashion.     Injection was performed using sterile technique.  Under ultrasound guidance a 1.5-inch 22-gauge needle was used to enter the superolateral aspect of the knee and inject euflexxa syringe.  Needle placement was visualized and documented with ultrasound.  Ultrasound visualization was necessary due to decreased joint space in the setting of  osteoarthritis and obesity.  Images were permanently stored for the patient's record.  There were no complications. The patient tolerated the procedure well. There was negligible bleeding.   The patient was instructed to ice the knee upon leaving clinic and refrain from overuse over the next 3 days.   The patient was instructed to call or go to the emergency room with any unusual pain, swelling, redness, or if otherwise concerned.  F/u for euflexxa injections again in 6 months  Cont. PT  F/u sooner if condition worsens        Again, thank you for allowing me to participate in the care of your patient.        Sincerely,        Kee Hood MD

## 2020-12-03 NOTE — PROGRESS NOTES
Arie returns for euflexxa injection in left knee as previously planned  Diagnosis: left knee arthritis, obesity    Left Knee Injection - Ultrasound Guided  The patient was informed of the risks and the benefits of the procedure and a written consent was signed.  The patient s knee was prepped with chlorhexidine in sterile fashion.     Injection was performed using sterile technique.  Under ultrasound guidance a 1.5-inch 22-gauge needle was used to enter the superolateral aspect of the knee and inject euflexxa syringe.  Needle placement was visualized and documented with ultrasound.  Ultrasound visualization was necessary due to decreased joint space in the setting of osteoarthritis and obesity.  Images were permanently stored for the patient's record.  There were no complications. The patient tolerated the procedure well. There was negligible bleeding.   The patient was instructed to ice the knee upon leaving clinic and refrain from overuse over the next 3 days.   The patient was instructed to call or go to the emergency room with any unusual pain, swelling, redness, or if otherwise concerned.  F/u for euflexxa injections again in 6 months  Cont. PT  F/u sooner if condition worsens

## 2021-07-23 ENCOUNTER — IMMUNIZATION (OUTPATIENT)
Dept: FAMILY MEDICINE | Facility: OTHER | Age: 56
End: 2021-07-23
Attending: FAMILY MEDICINE
Payer: COMMERCIAL

## 2021-07-23 PROCEDURE — 0001A PR COVID VAC PFIZER DIL RECON 30 MCG/0.3 ML IM: CPT

## 2021-07-23 PROCEDURE — 91300 PR COVID VAC PFIZER DIL RECON 30 MCG/0.3 ML IM: CPT

## 2021-08-13 ENCOUNTER — IMMUNIZATION (OUTPATIENT)
Dept: FAMILY MEDICINE | Facility: OTHER | Age: 56
End: 2021-08-13
Attending: FAMILY MEDICINE
Payer: COMMERCIAL

## 2021-08-13 PROCEDURE — 0002A PR COVID VAC PFIZER DIL RECON 30 MCG/0.3 ML IM: CPT

## 2021-08-13 PROCEDURE — 91300 PR COVID VAC PFIZER DIL RECON 30 MCG/0.3 ML IM: CPT

## 2021-10-07 ENCOUNTER — DOCUMENTATION ONLY (OUTPATIENT)
Dept: ORTHOPEDICS | Facility: CLINIC | Age: 56
End: 2021-10-07
Payer: COMMERCIAL

## 2021-10-07 DIAGNOSIS — M17.12 PRIMARY OSTEOARTHRITIS OF LEFT KNEE: Primary | ICD-10-CM

## 2021-10-11 ENCOUNTER — ANCILLARY PROCEDURE (OUTPATIENT)
Dept: GENERAL RADIOLOGY | Facility: CLINIC | Age: 56
End: 2021-10-11
Attending: PREVENTIVE MEDICINE
Payer: COMMERCIAL

## 2021-10-11 ENCOUNTER — OFFICE VISIT (OUTPATIENT)
Dept: ORTHOPEDICS | Facility: CLINIC | Age: 56
End: 2021-10-11
Payer: COMMERCIAL

## 2021-10-11 DIAGNOSIS — M19.072 ARTHRITIS OF LEFT ANKLE: ICD-10-CM

## 2021-10-11 DIAGNOSIS — M51.369 DDD (DEGENERATIVE DISC DISEASE), LUMBAR: ICD-10-CM

## 2021-10-11 DIAGNOSIS — M17.12 ARTHRITIS OF LEFT KNEE: Primary | ICD-10-CM

## 2021-10-11 DIAGNOSIS — M17.12 ARTHRITIS OF LEFT KNEE: ICD-10-CM

## 2021-10-11 PROCEDURE — 99214 OFFICE O/P EST MOD 30 MIN: CPT | Mod: 25 | Performed by: PREVENTIVE MEDICINE

## 2021-10-11 PROCEDURE — 73562 X-RAY EXAM OF KNEE 3: CPT | Mod: LT | Performed by: RADIOLOGY

## 2021-10-11 PROCEDURE — 73610 X-RAY EXAM OF ANKLE: CPT | Mod: LT | Performed by: RADIOLOGY

## 2021-10-11 PROCEDURE — 20610 DRAIN/INJ JOINT/BURSA W/O US: CPT | Mod: LT | Performed by: PREVENTIVE MEDICINE

## 2021-10-11 PROCEDURE — 72100 X-RAY EXAM L-S SPINE 2/3 VWS: CPT | Performed by: RADIOLOGY

## 2021-10-11 RX ORDER — MELOXICAM 15 MG/1
15 TABLET ORAL DAILY
Qty: 30 TABLET | Refills: 0 | Status: SHIPPED | OUTPATIENT
Start: 2021-10-11 | End: 2021-11-04

## 2021-10-11 RX ADMIN — TRIAMCINOLONE ACETONIDE 40 MG: 40 INJECTION, SUSPENSION INTRA-ARTICULAR; INTRAMUSCULAR at 12:25

## 2021-10-11 NOTE — NURSING NOTE
[facility]   ORTHOPEDICS & SPORTS MEDICINE  [address] [place] [address]  Dept: [phone]  ______________________________________________________________________________    Patient: Arie Hahn Jr.   : 1965   MRN: [id number]   2021    INVASIVE PROCEDURE SAFETY CHECKLIST    Date: 10/11/21  Procedure:Left knee Cortisone injection   Patient Name: Arie Hahn Jr.  MRN: [id number]  YOB: 1965    Action: Complete sections as appropriate. Any discrepancy results in a HARD COPY until resolved.     PRE PROCEDURE:  Patient ID verified with 2 identifiers (name and  or MRN): Yes  Procedure and site verified with patient/designee (when able): Yes  Accurate consent documentation in medical record: Yes  H&P (or appropriate assessment) documented in medical record: NA  H&P must be up to 20 days prior to procedure and updates within 24 hours of procedure as applicable: NA  Relevant diagnostic and radiology test results appropriately labeled and displayed as applicable: NA  Procedure site(s) marked with provider initials: NA    TIMEOUT:  Time-Out performed immediately prior to starting procedure, including verbal and active participation of all team members addressing the following:Yes  * Correct patient identify  * Confirmed that the correct side and site are marked  * An accurate procedure consent form  * Agreement on the procedure to be done  * Correct patient position  * Relevant images and results are properly labeled and appropriately displayed  * The need to administer antibiotics or fluids for irrigation purposes during the procedure as applicable   * Safety precautions based on patient history or medication use    DURING PROCEDURE: Verification of correct person, site, and procedures any time the responsibility for care of the patient is transferred to another member of the care team.       Prior to injection, verified patient identity using patient's name  and date of birth.  Due to injection administration, patient instructed to remain in clinic for 15 minutes  afterwards, and to report any adverse reaction to me immediately.    Joint injection was performed.      Drug Amount Wasted:  None.  Vial/Syringe: Single dose vial  Expiration Date:  01/2023      Mere Almonte, ATC  October 11, 2021

## 2021-10-11 NOTE — LETTER
10/11/2021         RE: Arie Hahn Jr.  [address] [facility] [place] [address]        Dear Colleague,    Thank you for referring your patient, Arie Hahn Jr., to the [facility] [place]. Please see a copy of my visit note below.    HISTORY OF PRESENT ILLNESS  Mr. Hahn is a pleasant 55 year old year old male who presents to clinic today with left knee and ankle pain and low back pain  Arie explains that his knee has started to bother him more over the past few months  Ankle as well  And low back  Location: see above  Quality:  achy pain    Severity: 6/10 at worst    Duration: months  Timing: occurs intermittently  Context: occurs while exercising and lifting  Modifying factors:  resting and non-use makes it better, movement and use makes it worse  Associated signs & symptoms: mild swelling in knee  MEDICAL HISTORY  Patient Active Problem List   Diagnosis     Morbid obesity (H)     Left knee pain       Current Outpatient Medications   Medication Sig Dispense Refill     ATENOLOL 50 MG OR TABS 1 tab twice daily       diclofenac (VOLTAREN) 1 % GEL Place onto the skin 4 times daily Apply to left knee 4 times daily as needed, wash hands after application. 1 Tube 1     DIGOXIN 0.25 MG OR TABS 1 TABLET DAILY       VASOTEC 5 MG OR TABS 1 tab twice daily       VITAMIN D, CHOLECALCIFEROL, PO Take by mouth daily       diclofenac (VOLTAREN) 1 % topical gel Place 4 g onto the skin 4 times daily 1 Tube 1       No Known Allergies    Family History   Problem Relation Age of Onset     Diabetes Mother      Hypertension Mother      Social History     Socioeconomic History     Marital status:      Spouse name: Not on file     Number of children: Not on file     Years of education: Not on file     Highest education level: Not on file   Occupational History     Not on file   Tobacco Use     Smoking status: Never Smoker     Smokeless tobacco: Never Used   Substance and Sexual  Activity     Alcohol use: Yes     Comment: weekends     Drug use: No     Sexual activity: Not on file   Other Topics Concern     Not on file   Social History Narrative     Not on file     Social Determinants of Health     Financial Resource Strain:      Difficulty of Paying Living Expenses:    Food Insecurity:      Worried About Running Out of Food in the Last Year:      Ran Out of Food in the Last Year:    Transportation Needs:      Lack of Transportation (Medical):      Lack of Transportation (Non-Medical):    Physical Activity:      Days of Exercise per Week:      Minutes of Exercise per Session:    Stress:      Feeling of Stress :    Social Connections:      Frequency of Communication with Friends and Family:      Frequency of Social Gatherings with Friends and Family:      Attends Holiness Services:      Active Member of Clubs or Organizations:      Attends Club or Organization Meetings:      Marital Status:    Intimate Partner Violence:      Fear of Current or Ex-Partner:      Emotionally Abused:      Physically Abused:      Sexually Abused:        Additional medical/Social/Surgical histories reviewed in [facility] and updated as appropriate.     REVIEW OF SYSTEMS (10/11/2021)  10 point ROS of systems including Constitutional, Eyes, Respiratory, Cardiovascular, Gastroenterology, Genitourinary, Integumentary, Musculoskeletal, Psychiatric, Allergic/Immunologic were all negative except for pertinent positives noted in my HPI.     PHYSICAL EXAM  VSS  General  - normal appearance, in no obvious distress  HEENT  - conjunctivae not injected, moist mucous membranes, normocephalic/atraumatic head, ears normal appearance, no lesions, mouth normal appearance, no scars, normal dentition and teeth present  CV  - normal popliteal pulse  Pulm  - normal respiratory pattern, non-labored  Musculoskeletal - left knee  - stance: normal gait without limp, no obvious leg length discrepancy  - inspection: trace effusion, no ecchymosis,  normal muscle tone, normal bone and joint alignment, no obvious deformity  - palpation: medial joint line tenderness, patella and patellar tendon non-tender, normal popliteal pulse  - ROM: 135 degrees flexion, -5 degrees extension, pain at terminal extension passively  - strength: 5/5 in flexion, 5/5 in extension  - neuro: no sensory or motor deficit  - special tests:  (-) Lachman  (-) anterior drawer  (-) posterior drawer  (-) pivot shift  (+) Debra  (+) Thessaly  (-) varus at 0 and 30 degrees flexion  (-) valgus at 0 and 30 degrees flexion  (+) Bebo s compression test  (-) patellar apprehension  Neuro  - no sensory or motor deficit, grossly normal coordination, normal muscle tone  Skin  - no ecchymosis, erythema, warmth, or induration, no obvious rash  Psych  - interactive, appropriate, normal mood and affect  Lumbar: has some pain with flexion and extension, positive SLR on left  Left ankle: has pain with flexion/extension, ttp over anterior ankle joint, no swelling    ASSESSMENT & PLAN  56 yo male with left knee arthritis, left ankle arthritis, lumbar ddd, radicular pain    I independently reviewed the following imaging studies:  xrays knee: shows djd  xrays ankle: shows arthritis  Lumbar xray: shows ddd  Discussed and will consider further imaging if not improving  After a 20 minute discussion and examination, we decided to perform a same day injection for diagnostic and therapeutic purposes for knee  Plan for HL injections for his knee  Rx given for mobic  Given HEP  F/u in 2-3 weeks   Appropriate PPE was utilized for prevention of spread of Covid-19.  Kee Hood MD, CAQSM    PROCEDURE:      left    Knee joint injection   The patient was apprised of the risks and the benefits of the procedure and consented and a written consent was signed.   The patient s  KNEE was sterilely prepped with chloraprep.   40 mg of triamcinolone suspension was drawn up into a 5 mL syringe with 4 mL of 1% lidocaine  The  patient was injected into the knee with a 1.5-inch 22-gauge needle at the_superiorlateral aspect of their knee joint  There were no complications. The patient tolerated the procedure well. There was minimal bleeding.   The patient was instructed to ice the knee upon leaving clinic and refrain from overuse over the next 3 days.   The patient was instructed to go to the emergency room with any usual pain, swelling, or redness occurred in the injected area.   The patient was given a followup appointment to evaluate response to the injection to their increased range of motion and reduction of pain.  Follow up PRN  Dr Kee Hood  Large Joint Injection/Arthocentesis: L knee joint    Date/Time: 10/11/2021 12:25 PM  Performed by: Kee Hood MD  Authorized by: Kee Hood MD     Indications:  Pain and osteoarthritis  Needle Size:  22 G  Guidance: landmark guided    Approach:  Superolateral  Location:  Knee      Medications:  40 mg triamcinolone 40 MG/ML  Procedure discussed: discussed risks, benefits, and alternatives    Consent Given by:  Patient  Timeout: timeout called immediately prior to procedure    Prep: patient was prepped and draped in usual sterile fashion     NDC: [address]-6          Again, thank you for allowing me to participate in the care of your patient.        Sincerely,        Kee Hood MD

## 2021-10-11 NOTE — PROGRESS NOTES
HISTORY OF PRESENT ILLNESS  Mr. Hahn is a pleasant 55 year old year old male who presents to clinic today with left knee and ankle pain and low back pain  Arie explains that his knee has started to bother him more over the past few months  Ankle as well  And low back  Location: see above  Quality:  achy pain    Severity: 6/10 at worst    Duration: months  Timing: occurs intermittently  Context: occurs while exercising and lifting  Modifying factors:  resting and non-use makes it better, movement and use makes it worse  Associated signs & symptoms: mild swelling in knee  MEDICAL HISTORY  Patient Active Problem List   Diagnosis     Morbid obesity (H)     Left knee pain       Current Outpatient Medications   Medication Sig Dispense Refill     ATENOLOL 50 MG OR TABS 1 tab twice daily       diclofenac (VOLTAREN) 1 % GEL Place onto the skin 4 times daily Apply to left knee 4 times daily as needed, wash hands after application. 1 Tube 1     DIGOXIN 0.25 MG OR TABS 1 TABLET DAILY       VASOTEC 5 MG OR TABS 1 tab twice daily       VITAMIN D, CHOLECALCIFEROL, PO Take by mouth daily       diclofenac (VOLTAREN) 1 % topical gel Place 4 g onto the skin 4 times daily 1 Tube 1       No Known Allergies    Family History   Problem Relation Age of Onset     Diabetes Mother      Hypertension Mother      Social History     Socioeconomic History     Marital status:      Spouse name: Not on file     Number of children: Not on file     Years of education: Not on file     Highest education level: Not on file   Occupational History     Not on file   Tobacco Use     Smoking status: Never Smoker     Smokeless tobacco: Never Used   Substance and Sexual Activity     Alcohol use: Yes     Comment: weekends     Drug use: No     Sexual activity: Not on file   Other Topics Concern     Not on file   Social History Narrative     Not on file     Social Determinants of Health     Financial Resource Strain:      Difficulty of Paying Living  Expenses:    Food Insecurity:      Worried About Running Out of Food in the Last Year:      Ran Out of Food in the Last Year:    Transportation Needs:      Lack of Transportation (Medical):      Lack of Transportation (Non-Medical):    Physical Activity:      Days of Exercise per Week:      Minutes of Exercise per Session:    Stress:      Feeling of Stress :    Social Connections:      Frequency of Communication with Friends and Family:      Frequency of Social Gatherings with Friends and Family:      Attends Nondenominational Services:      Active Member of Clubs or Organizations:      Attends Club or Organization Meetings:      Marital Status:    Intimate Partner Violence:      Fear of Current or Ex-Partner:      Emotionally Abused:      Physically Abused:      Sexually Abused:        Additional medical/Social/Surgical histories reviewed in [facility] and updated as appropriate.     REVIEW OF SYSTEMS (10/11/2021)  10 point ROS of systems including Constitutional, Eyes, Respiratory, Cardiovascular, Gastroenterology, Genitourinary, Integumentary, Musculoskeletal, Psychiatric, Allergic/Immunologic were all negative except for pertinent positives noted in my HPI.     PHYSICAL EXAM  VSS  General  - normal appearance, in no obvious distress  HEENT  - conjunctivae not injected, moist mucous membranes, normocephalic/atraumatic head, ears normal appearance, no lesions, mouth normal appearance, no scars, normal dentition and teeth present  CV  - normal popliteal pulse  Pulm  - normal respiratory pattern, non-labored  Musculoskeletal - left knee  - stance: normal gait without limp, no obvious leg length discrepancy  - inspection: trace effusion, no ecchymosis, normal muscle tone, normal bone and joint alignment, no obvious deformity  - palpation: medial joint line tenderness, patella and patellar tendon non-tender, normal popliteal pulse  - ROM: 135 degrees flexion, -5 degrees extension, pain at terminal extension passively  - strength:  5/5 in flexion, 5/5 in extension  - neuro: no sensory or motor deficit  - special tests:  (-) Lachman  (-) anterior drawer  (-) posterior drawer  (-) pivot shift  (+) Debra  (+) Thessaly  (-) varus at 0 and 30 degrees flexion  (-) valgus at 0 and 30 degrees flexion  (+) Bebo s compression test  (-) patellar apprehension  Neuro  - no sensory or motor deficit, grossly normal coordination, normal muscle tone  Skin  - no ecchymosis, erythema, warmth, or induration, no obvious rash  Psych  - interactive, appropriate, normal mood and affect  Lumbar: has some pain with flexion and extension, positive SLR on left  Left ankle: has pain with flexion/extension, ttp over anterior ankle joint, no swelling    ASSESSMENT & PLAN  56 yo male with left knee arthritis, left ankle arthritis, lumbar ddd, radicular pain    I independently reviewed the following imaging studies:  xrays knee: shows djd  xrays ankle: shows arthritis  Lumbar xray: shows ddd  Discussed and will consider further imaging if not improving  After a 20 minute discussion and examination, we decided to perform a same day injection for diagnostic and therapeutic purposes for knee  Plan for HL injections for his knee  Rx given for mobic  Given HEP  F/u in 2-3 weeks   Appropriate PPE was utilized for prevention of spread of Covid-19.  Kee Hood MD, CAQSM    PROCEDURE:      left    Knee joint injection   The patient was apprised of the risks and the benefits of the procedure and consented and a written consent was signed.   The patient s  KNEE was sterilely prepped with chloraprep.   40 mg of triamcinolone suspension was drawn up into a 5 mL syringe with 4 mL of 1% lidocaine  The patient was injected into the knee with a 1.5-inch 22-gauge needle at the_superiorlateral aspect of their knee joint  There were no complications. The patient tolerated the procedure well. There was minimal bleeding.   The patient was instructed to ice the knee upon leaving clinic and  refrain from overuse over the next 3 days.   The patient was instructed to go to the emergency room with any usual pain, swelling, or redness occurred in the injected area.   The patient was given a followup appointment to evaluate response to the injection to their increased range of motion and reduction of pain.  Follow up PRN  Dr Kee Hood  Large Joint Injection/Arthocentesis: L knee joint    Date/Time: 10/11/2021 12:25 PM  Performed by: Kee Hood MD  Authorized by: Kee Hood MD     Indications:  Pain and osteoarthritis  Needle Size:  22 G  Guidance: landmark guided    Approach:  Superolateral  Location:  Knee      Medications:  40 mg triamcinolone 40 MG/ML  Procedure discussed: discussed risks, benefits, and alternatives    Consent Given by:  Patient  Timeout: timeout called immediately prior to procedure    Prep: patient was prepped and draped in usual sterile fashion     NDC: [address]-5

## 2021-10-24 RX ORDER — TRIAMCINOLONE ACETONIDE 40 MG/ML
40 INJECTION, SUSPENSION INTRA-ARTICULAR; INTRAMUSCULAR
Status: SHIPPED | OUTPATIENT
Start: 2021-10-11

## 2021-11-01 DIAGNOSIS — M17.12 ARTHRITIS OF LEFT KNEE: ICD-10-CM

## 2021-11-01 DIAGNOSIS — M19.072 ARTHRITIS OF LEFT ANKLE: ICD-10-CM

## 2021-11-01 DIAGNOSIS — M51.369 DDD (DEGENERATIVE DISC DISEASE), LUMBAR: ICD-10-CM

## 2021-11-04 RX ORDER — MELOXICAM 15 MG/1
TABLET ORAL
Qty: 30 TABLET | Refills: 0 | Status: SHIPPED | OUTPATIENT
Start: 2021-11-04 | End: 2023-04-21

## 2022-01-01 NOTE — PROGRESS NOTES
Arie Hahn Jr.'s chief complaint for this visit includes:  Chief Complaint   Patient presents with     Procedure     Left knee Euflexxa #1     PCP: No Ref-Primary, Physician    Referring Provider:  No referring provider defined for this encounter.    There were no vitals taken for this visit.  Moderate Pain (4)     Do you need any medication refills at today's visit? No    Large Joint Injection/Arthocentesis: L knee joint    Date/Time: 11/18/2020 3:36 PM  Performed by: Kee Hood MD  Authorized by: Kee Hood MD     Indications:  Pain and osteoarthritis  Needle Size:  22 G  Guidance: ultrasound    Approach:  Lateral  Location:  Knee      Medications:  20 mg sodium hyaluronate 20 MG/2ML  Procedure discussed: discussed risks, benefits, and alternatives    Consent Given by:  Patient  Timeout: timeout called immediately prior to procedure    Prep: patient was prepped and draped in usual sterile fashion       Eufflexa: [address]-4        Arie returns for euflexxa injection in left knee as previously planned  Diagnosis: left knee arthritis, obesity    Left Knee Injection - Ultrasound Guided  The patient was informed of the risks and the benefits of the procedure and a written consent was signed.  The patient s knee was prepped with chlorhexidine in sterile fashion.     Injection was performed using sterile technique.  Under ultrasound guidance a 1.5-inch 22-gauge needle was used to enter the superolateral aspect of the knee and inject euflexxa syringe.  Needle placement was visualized and documented with ultrasound.  Ultrasound visualization was necessary due to decreased joint space in the setting of osteoarthritis and obesity.  Images were permanently stored for the patient's record.  There were no complications. The patient tolerated the procedure well. There was negligible bleeding.   The patient was instructed to ice the knee upon leaving clinic and refrain from overuse over the next 3  days.   The patient was instructed to call or go to the emergency room with any unusual pain, swelling, redness, or if otherwise concerned.  F/u for euflexxa injections     Improved

## 2022-01-24 ENCOUNTER — TELEPHONE (OUTPATIENT)
Dept: ORTHOPEDICS | Facility: CLINIC | Age: 57
End: 2022-01-24

## 2022-01-24 ENCOUNTER — ANCILLARY PROCEDURE (OUTPATIENT)
Dept: GENERAL RADIOLOGY | Facility: CLINIC | Age: 57
End: 2022-01-24
Attending: PREVENTIVE MEDICINE
Payer: OTHER MISCELLANEOUS

## 2022-01-24 ENCOUNTER — DOCUMENTATION ONLY (OUTPATIENT)
Dept: ORTHOPEDICS | Facility: CLINIC | Age: 57
End: 2022-01-24

## 2022-01-24 ENCOUNTER — OFFICE VISIT (OUTPATIENT)
Dept: ORTHOPEDICS | Facility: CLINIC | Age: 57
End: 2022-01-24
Payer: OTHER MISCELLANEOUS

## 2022-01-24 DIAGNOSIS — M17.12 ARTHRITIS OF LEFT KNEE: ICD-10-CM

## 2022-01-24 DIAGNOSIS — M54.16 LUMBAR RADICULAR PAIN: ICD-10-CM

## 2022-01-24 DIAGNOSIS — M25.562 LEFT KNEE PAIN, UNSPECIFIED CHRONICITY: Primary | ICD-10-CM

## 2022-01-24 DIAGNOSIS — M17.11 ARTHRITIS OF RIGHT KNEE: Primary | ICD-10-CM

## 2022-01-24 DIAGNOSIS — M17.11 ARTHRITIS OF RIGHT KNEE: ICD-10-CM

## 2022-01-24 PROCEDURE — 73562 X-RAY EXAM OF KNEE 3: CPT | Mod: RT | Performed by: RADIOLOGY

## 2022-01-24 PROCEDURE — 99214 OFFICE O/P EST MOD 30 MIN: CPT | Performed by: PREVENTIVE MEDICINE

## 2022-01-24 RX ORDER — METHYLPREDNISOLONE 4 MG
TABLET, DOSE PACK ORAL
Qty: 21 TABLET | Refills: 0 | Status: SHIPPED | OUTPATIENT
Start: 2022-01-24 | End: 2023-04-21

## 2022-01-24 ASSESSMENT — PAIN SCALES - GENERAL: PAINLEVEL: SEVERE PAIN (6)

## 2022-01-24 NOTE — PROGRESS NOTES
HISTORY OF PRESENT ILLNESS  Mr. 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 1 Tube 1     diclofenac (VOLTAREN) 1 % topical gel Place 4 g onto the skin 4 times daily 1 Tube 1     DIGOXIN 0.25 MG OR TABS 1 TABLET DAILY       meloxicam (MOBIC) 15 MG tablet TAKE 1 TABLET BY MOUTH EVERY DAY 30 tablet 0     VASOTEC 5 MG OR TABS 1 tab twice daily       VITAMIN D, CHOLECALCIFEROL, PO Take by mouth daily         No Known Allergies    Family History   Problem Relation Age of Onset     Diabetes Mother      Hypertension Mother      Social History     Socioeconomic History     Marital status:      Spouse name: Not on file     Number of children: Not on file     Years of education: Not on file     Highest education level: Not on file   Occupational History     Not on file   Tobacco Use     Smoking status: Never Smoker     Smokeless tobacco: Never Used   Substance and Sexual Activity     Alcohol use: Yes     Comment: weekends     Drug use: No     Sexual activity: Not on file   Other Topics Concern     Not on file   Social History Narrative     Not on file     Social Determinants of Health     Financial Resource Strain: Not on file   Food Insecurity: Not on file   Transportation Needs: Not on file    Physical Activity: Not on file   Stress: Not on file   Social Connections: Not on file   Intimate Partner Violence: Not on file   Housing Stability: Not on file       Additional medical/Social/Surgical histories reviewed in [facility] and updated as appropriate.     REVIEW OF SYSTEMS (1/24/2022)  10 point ROS of systems including Constitutional, Eyes, Respiratory, Cardiovascular, Gastroenterology, Genitourinary, Integumentary, Musculoskeletal, Psychiatric, Allergic/Immunologic were all negative except for pertinent positives noted in my HPI.     PHYSICAL EXAM  VSS  General  - normal appearance, in no obvious distress  HEENT  - conjunctivae not injected, moist mucous membranes, normocephalic/atraumatic head, ears normal appearance, no lesions, mouth normal appearance, no scars, normal dentition and teeth present  CV  - normal popliteal pulse  Pulm  - normal respiratory pattern, non-labored  Musculoskeletal - right knee  - stance: non antalgic gait, genu varum  - inspection: generalized swelling, trace effusion  - palpation: medial joint line tenderness, patella and patellar tendon non-tender, normal popliteal pulse  - ROM: 100 degrees flexion, 0 degrees extension, slightly painful active ROM  - strength: 5/5 in flexion, 5/5 in extension  - neuro: no sensory or motor deficit  - special tests:  (-) Lachman  (-) anterior drawer  (-) posterior drawer  (-) pivot shift  (-) Debra  (-) Thessaly  (-) varus at 0 and 30 degrees flexion  (-) valgus at 0 and 30 degrees flexion  (+) Bebo s compression test  (-) patellar apprehension  Neuro  - no sensory or motor deficit, grossly normal coordination, normal muscle tone  Skin  - no ecchymosis, erythema, warmth, or induration, no obvious rash  Psych  - interactive, appropriate, normal mood and affect  Lumbar: has some pain with flexion and extension, positive SLR On right  ASSESSMENT & PLAN  55 yo male with right knee arthritis, lumbar radicular pain, stable  Not resolved    I  independently reviewed the following imaging studies:  Right knee xray: shows djd  Discussed considering repeat injection  F/u in 1 month  Given work restrictions  RX for medrol    Appropriate PPE was utilized for prevention of spread of Covid-19.  Kee Hood MD, CAQSM

## 2022-01-24 NOTE — PROGRESS NOTES
[facility] FOLLOW-UP VISIT 1/24/2022    Arie Hahn Jr.'s chief complaint for this visit includes:  Chief Complaint   Patient presents with     RECHECK     left knee pain follow-up     PCP: No Ref-Primary, Physician    Referring Provider:  No referring provider defined for this encounter.    There were no vitals taken for this visit.  Data Unavailable       Interval History:     Follow up reason: Pain in left knee, also endorsing pain in right knee after last week    Date last seen: 10/11/21    Following Therapeutic Plan: Yes     Pain: Worsening    Function: Worsening     Medical History:    Any recent changes to your medical history? No    Any new medication prescribed since last visit? No    Review of Systems:    Do you have fever, chills, weight loss? No    Do you have any vision problems? No    Do you have any chest pain or edema? No    Do you have any shortness of breath or wheezing?  No    Do you have stomach problems? No    Do you have any urinary track issues? No    Do you have any numbness or focal weakness? No    Do you have diabetes? No    Do you have problems with bleeding or clotting? No    Do you have an rashes or other skin lesions? No

## 2022-01-24 NOTE — LETTER
January 24, 2022      Arie Hahn Jr.  [address] [facility] [place] [address]        To whom it may concern:  Arie is under my care for a work related medical condition.  He can return to work on 1/24/22 with the following restrictions:  No lifting, pushing, pulling over 10 pounds.  I will be re-evaluating him by 2/21/22.    Please contact me with any questions or concerns.              Sincerely,      Kee Hood MD

## 2022-01-24 NOTE — LETTER
1/24/2022         RE: Arie Hahn Jr.  [address] [address]        Dear Colleague,    Thank you for referring your patient, Arie Hahn Jr., to the [facility] [place]. Please see a copy of my visit note below.          [facility] FOLLOW-UP VISIT 1/24/2022    Arie Hahn Jr.'s chief complaint for this visit includes:  Chief Complaint   Patient presents with     RECHECK     left knee pain follow-up     PCP: No Ref-Primary, Physician    Referring Provider:  No referring provider defined for this encounter.    There were no vitals taken for this visit.  Data Unavailable       Interval History:     Follow up reason: Pain in left knee, also endorsing pain in right knee after last week    Date last seen: 10/11/21    Following Therapeutic Plan: Yes     Pain: Worsening    Function: Worsening     Medical History:    Any recent changes to your medical history? No    Any new medication prescribed since last visit? No    Review of Systems:    Do you have fever, chills, weight loss? No    Do you have any vision problems? No    Do you have any chest pain or edema? No    Do you have any shortness of breath or wheezing?  No    Do you have stomach problems? No    Do you have any urinary track issues? No    Do you have any numbness or focal weakness? No    Do you have diabetes? No    Do you have problems with bleeding or clotting? No    Do you have an rashes or other skin lesions? No    HISTORY OF PRESENT ILLNESS  Mr. 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 1 Tube 1     diclofenac (VOLTAREN) 1 % topical gel Place 4 g onto the skin 4 times daily 1 Tube 1     DIGOXIN 0.25 MG OR TABS 1 TABLET DAILY       meloxicam (MOBIC) 15 MG tablet TAKE 1 TABLET BY MOUTH EVERY DAY 30 tablet 0     VASOTEC 5 MG OR TABS 1 tab twice daily       VITAMIN D, CHOLECALCIFEROL, PO Take by mouth daily         No Known Allergies    Family History   Problem Relation Age of Onset     Diabetes Mother      Hypertension Mother      Social History     Socioeconomic History     Marital status:      Spouse name: Not on file     Number of children: Not on file     Years of education: Not on file     Highest education level: Not on file   Occupational History     Not on file   Tobacco Use     Smoking status: Never Smoker     Smokeless tobacco: Never Used   Substance and Sexual Activity     Alcohol use: Yes     Comment: weekends     Drug use: No     Sexual activity: Not on file   Other Topics Concern     Not on file   Social History Narrative     Not on file     Social Determinants of Health     Financial Resource Strain: Not on file   Food Insecurity: Not on file   Transportation Needs: Not on file   Physical Activity: Not on file   Stress: Not on file   Social Connections: Not on file   Intimate Partner Violence: Not on file   Housing Stability: Not on file       Additional medical/Social/Surgical histories reviewed in [facility] and updated as appropriate.     REVIEW OF SYSTEMS (1/24/2022)  10 point ROS of systems including Constitutional, Eyes, Respiratory, Cardiovascular, Gastroenterology, Genitourinary, Integumentary, Musculoskeletal, Psychiatric,  Allergic/Immunologic were all negative except for pertinent positives noted in my HPI.     PHYSICAL EXAM  VSS  General  - normal appearance, in no obvious distress  HEENT  - conjunctivae not injected, moist mucous membranes, normocephalic/atraumatic head, ears normal appearance, no lesions, mouth normal appearance, no scars, normal dentition and teeth present  CV  - normal popliteal pulse  Pulm  - normal respiratory pattern, non-labored  Musculoskeletal - right knee  - stance: non antalgic gait, genu varum  - inspection: generalized swelling, trace effusion  - palpation: medial joint line tenderness, patella and patellar tendon non-tender, normal popliteal pulse  - ROM: 100 degrees flexion, 0 degrees extension, slightly painful active ROM  - strength: 5/5 in flexion, 5/5 in extension  - neuro: no sensory or motor deficit  - special tests:  (-) Lachman  (-) anterior drawer  (-) posterior drawer  (-) pivot shift  (-) Debra  (-) Thessaly  (-) varus at 0 and 30 degrees flexion  (-) valgus at 0 and 30 degrees flexion  (+) Bebo s compression test  (-) patellar apprehension  Neuro  - no sensory or motor deficit, grossly normal coordination, normal muscle tone  Skin  - no ecchymosis, erythema, warmth, or induration, no obvious rash  Psych  - interactive, appropriate, normal mood and affect  Lumbar: has some pain with flexion and extension, positive SLR On right  ASSESSMENT & PLAN  57 yo male with right knee arthritis, lumbar radicular pain, stable  Not resolved    I independently reviewed the following imaging studies:  Right knee xray: shows djd  Discussed considering repeat injection  F/u in 1 month  Given work restrictions  RX for medrol    Appropriate PPE was utilized for prevention of spread of Covid-19.  Kee Hood MD, CAQSM        Again, thank you for allowing me to participate in the care of your patient.        Sincerely,        Kee Hood MD

## 2022-02-07 ENCOUNTER — OFFICE VISIT (OUTPATIENT)
Dept: ORTHOPEDICS | Facility: CLINIC | Age: 57
End: 2022-02-07
Payer: COMMERCIAL

## 2022-02-07 DIAGNOSIS — M51.16 LUMBAR DISC HERNIATION WITH RADICULOPATHY: ICD-10-CM

## 2022-02-07 DIAGNOSIS — M17.12 ARTHRITIS OF LEFT KNEE: Primary | ICD-10-CM

## 2022-02-07 DIAGNOSIS — M51.369 DDD (DEGENERATIVE DISC DISEASE), LUMBAR: ICD-10-CM

## 2022-02-07 PROCEDURE — 20610 DRAIN/INJ JOINT/BURSA W/O US: CPT | Mod: LT | Performed by: PREVENTIVE MEDICINE

## 2022-02-07 PROCEDURE — 99212 OFFICE O/P EST SF 10 MIN: CPT | Mod: 25 | Performed by: PREVENTIVE MEDICINE

## 2022-02-07 RX ADMIN — Medication 20 MG: at 08:39

## 2022-02-07 NOTE — PROGRESS NOTES
"HISTORY OF PRESENT ILLNESS  Mr. Hahn is a pleasant 56 year old year old male who presents to clinic today with   Arie explains that   Location:   Quality:  achy pain    Severity: {BlankBase Single Select.:[id number]::\"1\",\"2\",\"3\",\"4\",\"5\",\"6\",\"7\",\"8\",\"9\",\"10\"}/10 at worst    Duration:   Timing: occurs intermittently  Context: occurs while exercising and lifting  Modifying factors:  resting and non-use makes it better, movement and use makes it worse  Associated signs & symptoms: none  Previous similar pain: yes  Exercise: lifting weights and cardiovascular on machine  Additional history: as documented    MEDICAL HISTORY  Patient Active Problem List   Diagnosis     Morbid obesity (H)     Left knee pain       Current Outpatient Medications   Medication Sig Dispense Refill     ATENOLOL 50 MG OR TABS 1 tab twice daily       diclofenac (VOLTAREN) 1 % GEL Place onto the skin 4 times daily Apply to left knee 4 times daily as needed, wash hands after application. 1 Tube 1     diclofenac (VOLTAREN) 1 % topical gel Place 4 g onto the skin 4 times daily 1 Tube 1     DIGOXIN 0.25 MG OR TABS 1 TABLET DAILY       meloxicam (MOBIC) 15 MG tablet TAKE 1 TABLET BY MOUTH EVERY DAY 30 tablet 0     methylPREDNISolone (MEDROL) 4 MG tablet therapy pack Follow Package Directions 21 tablet 0     VASOTEC 5 MG OR TABS 1 tab twice daily       VITAMIN D, CHOLECALCIFEROL, PO Take by mouth daily         No Known Allergies    Family History   Problem Relation Age of Onset     Diabetes Mother      Hypertension Mother      Social History     Socioeconomic History     Marital status:      Spouse name: Not on file     Number of children: Not on file     Years of education: Not on file     Highest education level: Not on file   Occupational History     Not on file   Tobacco Use     Smoking status: Never Smoker     Smokeless tobacco: Never Used   Substance and Sexual Activity     Alcohol use: Yes     Comment: weekends     Drug use: No     Sexual " activity: Not on file   Other Topics Concern     Not on file   Social History Narrative     Not on file     Social Determinants of Health     Financial Resource Strain: Not on file   Food Insecurity: Not on file   Transportation Needs: Not on file   Physical Activity: Not on file   Stress: Not on file   Social Connections: Not on file   Intimate Partner Violence: Not on file   Housing Stability: Not on file       Additional medical/Social/Surgical histories reviewed in [facility] and updated as appropriate.     REVIEW OF SYSTEMS (2/7/2022)  10 point ROS of systems including Constitutional, Eyes, Respiratory, Cardiovascular, Gastroenterology, Genitourinary, Integumentary, Musculoskeletal, Psychiatric, Allergic/Immunologic were all negative except for pertinent positives noted in my HPI.     PHYSICAL EXAM  Vital Signs: There were no vitals taken for this visit. Patient declined being weighed. There is no height or weight on file to calculate BMI.    General  - normal appearance, in no obvious distress  HEENT  - conjunctivae not injected, moist mucous membranes, normocephalic/atraumatic head, ears normal appearance, no lesions, mouth normal appearance, no scars, normal dentition and teeth present  CV  - normal peripheral perfusion  Pulm  - normal respiratory pattern, non-labored  Musculoskeletal - lumbar spine  - stance: normal gait without limp, no obvious leg length discrepancy, normal heel and toe walk  - inspection: normal bone and joint alignment, no obvious scoliosis  - palpation: no paravertebral or bony tenderness  - ROM: flexion exacerbates pain, normal extension, sidebending, rotation  - strength: lower extremities 5/5 in all planes  - special tests:  (+) straight leg raise  (+) slump test  Neuro  - patellar and Achilles DTRs 2+ bilaterally, lower extremity sensory deficit throughout L5 distribution, grossly normal coordination, normal muscle tone  Skin  - no ecchymosis, erythema, warmth, or induration, no obvious  rash  Psych  - interactive, appropriate, normal mood and affect  ASSESSMENT & PLAN  55 yo male with left knee arthritis, lumbar ddd, radicular pain, stable, not resolved    I independently reviewed the following imaging studies:  Lumbar xray: shows ddd  Left and right knee xrays: shows djd  After a 20 minute discussion and examination, we decided to perform a same day injection for therapeutic purposes for his left knee as planned with euflexxa #1    Appropriate PPE was utilized for prevention of spread of Covid-19.  Kee Hood MD, CAQSM  PROCEDURE:       Left      Knee joint injection   The patient was apprised of the risks and the benefits of the procedure and consented and a written consent was signed.   The patient s  KNEE was sterilely prepped with chloraprep.   The patient was injected with euflexxa syringe into the knee with a 1.5-inch 22-gauge needle at the_superiorlateral aspect of their knee joint  There were no complications. The patient tolerated the procedure well. There was minimal bleeding.   The patient was instructed to ice the knee upon leaving clinic and refrain from overuse over the next 3 days.   The patient was instructed to go to the emergency room with any usual pain, swelling, or redness occurred in the injected area.   The patient was given a followup appointment to evaluate response to the injection to their increased range of motion and reduction of pain.  Follow up for euflexxa   Dr Kee Hood

## 2022-02-07 NOTE — LETTER
2/7/2022         RE: Arie Hahn Jr.  [address] [facility] [address]        Dear Colleague,    Thank you for referring your patient, Arie Hahn Jr., to the [facility] [place]. Please see a copy of my visit note below.          [facility] FOLLOW-UP VISIT 2/7/2022    Arie Hahn Jr.'s chief complaint for this visit includes:  Chief Complaint   Patient presents with     RECHECK     Left knee pain - discuss next steps     PCP: No Ref-Primary, Physician    Referring Provider:  No referring provider defined for this encounter.    There were no vitals taken for this visit.  Data Unavailable       Interval History:     Follow up reason: Left knee pain    Date last seen: 1/24/22    Following Therapeutic Plan: Yes     Pain: Unchanged    Function: Unchanged    Interval History: Was taking steroids and had improvement after first week but not any better now.      Medical History:    Any recent changes to your medical history? No    Any new medication prescribed since last visit? No    Review of Systems:    Do you have fever, chills, weight loss? No    Do you have any vision problems? No    Do you have any chest pain or edema? No    Do you have any shortness of breath or wheezing?  No    Do you have stomach problems? No    Do you have any urinary track issues? No    Do you have any numbness or focal weakness? No    Do you have diabetes? No    Do you have problems with bleeding or clotting? No    Do you have an rashes or other skin lesions? No    Large Joint Injection/Arthocentesis: L knee joint    Date/Time: 2/7/2022 8:39 AM  Performed by: Kee Hood MD  Authorized by: Kee Hood MD     Indications:  Pain, osteoarthritis, diagnostic evaluation and joint swelling  Needle Size:  22 G  Guidance: landmark guided    Approach:  Superolateral  Location:  Knee      Medications:  20 mg sodium hyaluronate 20 MG/2ML  Outcome:  Tolerated  well, no immediate complications  Procedure discussed: discussed risks, benefits, and alternatives    Consent Given by:  Patient  Timeout: timeout called immediately prior to procedure    Prep: patient was prepped and draped in usual sterile fashion     NDC: [address]-0          HISTORY OF PRESENT ILLNESS  Mr. 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 This year, see previous note.  Timing: occurs intermittently  Context: occurs while exercising and lifting  Modifying factors:  resting and non-use makes it better, movement and use makes it worse  Associated signs & symptoms: some swelling in knees, and low back pain that radiates into legs  Previous similar pain: yes  Exercise: lifting weights and cardiovascular on machine  Additional history: as documented    MEDICAL HISTORY  Patient Active Problem List   Diagnosis     Morbid obesity (H)     Left knee pain       Current Outpatient Medications   Medication Sig Dispense Refill     ATENOLOL 50 MG OR TABS 1 tab twice daily       diclofenac (VOLTAREN) 1 % GEL Place onto the skin 4 times daily Apply to left knee 4 times daily as needed, wash hands after application. 1 Tube 1     diclofenac (VOLTAREN) 1 % topical gel Place 4 g onto the skin 4 times daily 1 Tube 1     DIGOXIN 0.25 MG OR TABS 1 TABLET DAILY       meloxicam (MOBIC) 15 MG tablet TAKE 1 TABLET BY MOUTH EVERY  DAY 30 tablet 0     methylPREDNISolone (MEDROL) 4 MG tablet therapy pack Follow Package Directions 21 tablet 0     VASOTEC 5 MG OR TABS 1 tab twice daily       VITAMIN D, CHOLECALCIFEROL, PO Take by mouth daily         No Known Allergies    Family History   Problem Relation Age of Onset     Diabetes Mother      Hypertension Mother      Social History     Socioeconomic History     Marital status:      Spouse name: Not on file     Number of children: Not on file     Years of education: Not on file     Highest education level: Not on file   Occupational History     Not on file   Tobacco Use     Smoking status: Never Smoker     Smokeless tobacco: Never Used   Substance and Sexual Activity     Alcohol use: Yes     Comment: weekends     Drug use: No     Sexual activity: Not on file   Other Topics Concern     Not on file   Social History Narrative     Not on file     Social Determinants of Health     Financial Resource Strain: Not on file   Food Insecurity: Not on file   Transportation Needs: Not on file   Physical Activity: Not on file   Stress: Not on file   Social Connections: Not on file   Intimate Partner Violence: Not on file   Housing Stability: Not on file       Additional medical/Social/Surgical histories reviewed in [facility] and updated as appropriate.     REVIEW OF SYSTEMS (2/7/2022)  10 point ROS of systems including Constitutional, Eyes, Respiratory, Cardiovascular, Gastroenterology, Genitourinary, Integumentary, Musculoskeletal, Psychiatric, Allergic/Immunologic were all negative except for pertinent positives noted in my HPI.     PHYSICAL EXAM  VSS    General  - normal appearance, in no obvious distress  HEENT  - conjunctivae not injected, moist mucous membranes, normocephalic/atraumatic head, ears normal appearance, no lesions, mouth normal appearance, no scars, normal dentition and teeth present  CV  - normal peripheral perfusion  Pulm  - normal respiratory pattern, non-labored  Musculoskeletal - lumbar  spine  - stance: normal gait without limp, no obvious leg length discrepancy, normal heel and toe walk  - inspection: normal bone and joint alignment, no obvious scoliosis  - palpation: no paravertebral or bony tenderness, except along lumbar paraspinal muscles  - ROM: flexion exacerbates pain, normal extension, sidebending, rotation, cause discomfort in low back  - strength: lower extremities 5/5 in all planes  - special tests:  (+) straight leg raise  (+) slump test  Neuro  - patellar and Achilles DTRs 2+ bilaterally, some lower extremity sensory deficit throughout L5 distribution, grossly normal coordination, normal muscle tone  Skin  - no ecchymosis, erythema, warmth, or induration, no obvious rash  Psych  - interactive, appropriate, normal mood and affect  Left knee: has mild swelling, pain to palpation over medial joint and PF joint, and pain with flexion and patellar compression  Right knee: has some ttp over medial joint, and PF joint, not as bad as left knee  ASSESSMENT & PLAN  55 yo male with left knee arthritis, lumbar ddd, radicular pain, stable, not resolved, and right knee djd    I independently reviewed the following imaging studies:  Lumbar xray: shows ddd  Discussed and ordered lumbar MRI due to severity of symptoms and concern for herniated disc lumbar  Left and right knee xrays: shows djd    After a 20 minute discussion and examination, we decided to perform a same day injection for therapeutic purposes for his left knee as planned with euflexxa #1  F/u for repeat euflexxa  And   Appropriate PPE was utilized for prevention of spread of Covid-19.  Kee Hood MD, CAQSM  PROCEDURE:       Left      Knee joint injection   The patient was apprised of the risks and the benefits of the procedure and consented and a written consent was signed.   The patient s  KNEE was sterilely prepped with chloraprep.   The patient was injected with euflexxa syringe into the knee with a 1.5-inch 22-gauge needle at  the_superiorlateral aspect of their knee joint  There were no complications. The patient tolerated the procedure well. There was minimal bleeding.   The patient was instructed to ice the knee upon leaving clinic and refrain from overuse over the next 3 days.   The patient was instructed to go to the emergency room with any usual pain, swelling, or redness occurred in the injected area.   The patient was given a followup appointment to evaluate response to the injection to their increased range of motion and reduction of pain.  Follow up for euflexxa   Dr Kee Hood      Again, thank you for allowing me to participate in the care of your patient.        Sincerely,        Kee Hood MD

## 2022-02-07 NOTE — PATIENT INSTRUCTIONS
Thanks for coming today.  Ortho[facility] Medicine Clinic  [address] [place] [address]    To schedule future appointments in Ortho Clinic, you may call [phone].    To schedule ordered imaging or an injection ordered by your provider:  Call [facility] Injection scheduling line: [phone]    MyChart available online at:  [email or web address]    Please call if any further questions or concerns ([phone]).  Clinic hours 8 am to 5 pm.    Return to clinic (call) if symptoms worsen or fail to improve.

## 2022-02-07 NOTE — LETTER
February 7, 2022      Arie Hahn Jr.  [address] [facility] [address]            To whom it may concern:    Arie is under my care for a work related medical condition.    He can return to work on 2/7/22 with the following restrictions:  No lifting, pushing, pulling over 10 pounds.  I will be re-evaluating him by 3/11/22.        Please contact me with any questions or concerns.        Sincerely,      Kee Hood MD

## 2022-02-07 NOTE — PROGRESS NOTES
[facility] FOLLOW-UP VISIT 2/7/2022    Arie Hahn Jr.'s chief complaint for this visit includes:  Chief Complaint   Patient presents with     RECHECK     Left knee pain - discuss next steps     PCP: No Ref-Primary, Physician    Referring Provider:  No referring provider defined for this encounter.    There were no vitals taken for this visit.  Data Unavailable       Interval History:     Follow up reason: Left knee pain    Date last seen: 1/24/22    Following Therapeutic Plan: Yes     Pain: Unchanged    Function: Unchanged    Interval History: Was taking steroids and had improvement after first week but not any better now.      Medical History:    Any recent changes to your medical history? No    Any new medication prescribed since last visit? No    Review of Systems:    Do you have fever, chills, weight loss? No    Do you have any vision problems? No    Do you have any chest pain or edema? No    Do you have any shortness of breath or wheezing?  No    Do you have stomach problems? No    Do you have any urinary track issues? No    Do you have any numbness or focal weakness? No    Do you have diabetes? No    Do you have problems with bleeding or clotting? No    Do you have an rashes or other skin lesions? No    Large Joint Injection/Arthocentesis: L knee joint    Date/Time: 2/7/2022 8:39 AM  Performed by: Kee Hood MD  Authorized by: Kee Hood MD     Indications:  Pain, osteoarthritis, diagnostic evaluation and joint swelling  Needle Size:  22 G  Guidance: landmark guided    Approach:  Superolateral  Location:  Knee      Medications:  20 mg sodium hyaluronate 20 MG/2ML  Outcome:  Tolerated well, no immediate complications  Procedure discussed: discussed risks, benefits, and alternatives    Consent Given by:  Patient  Timeout: timeout called immediately prior to procedure    Prep: patient was prepped and draped in usual sterile fashion     NDC: [address]-4

## 2022-02-14 ENCOUNTER — ANCILLARY PROCEDURE (OUTPATIENT)
Dept: MRI IMAGING | Facility: CLINIC | Age: 57
End: 2022-02-14
Attending: PREVENTIVE MEDICINE
Payer: COMMERCIAL

## 2022-02-14 DIAGNOSIS — M51.369 DDD (DEGENERATIVE DISC DISEASE), LUMBAR: ICD-10-CM

## 2022-02-14 DIAGNOSIS — M51.16 LUMBAR DISC HERNIATION WITH RADICULOPATHY: ICD-10-CM

## 2022-02-14 PROCEDURE — 72148 MRI LUMBAR SPINE W/O DYE: CPT | Mod: GC | Performed by: STUDENT IN AN ORGANIZED HEALTH CARE EDUCATION/TRAINING PROGRAM

## 2022-02-15 RX ORDER — HYALURONATE SODIUM 10 MG/ML
20 SYRINGE (ML) INTRAARTICULAR
Status: SHIPPED | OUTPATIENT
Start: 2022-02-07

## 2022-02-15 NOTE — PROGRESS NOTES
HISTORY OF PRESENT ILLNESS  Mr. 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 This year, see previous note.  Timing: occurs intermittently  Context: occurs while exercising and lifting  Modifying factors:  resting and non-use makes it better, movement and use makes it worse  Associated signs & symptoms: some swelling in knees, and low back pain that radiates into legs  Previous similar pain: yes  Exercise: lifting weights and cardiovascular on machine  Additional history: as documented    MEDICAL HISTORY  Patient Active Problem List   Diagnosis     Morbid obesity (H)     Left knee pain       Current Outpatient Medications   Medication Sig Dispense Refill     ATENOLOL 50 MG OR TABS 1 tab twice daily       diclofenac (VOLTAREN) 1 % GEL Place onto the skin 4 times daily Apply to left knee 4 times daily as needed, wash hands after application. 1 Tube 1     diclofenac (VOLTAREN) 1 % topical gel Place 4 g onto the skin 4 times daily 1 Tube 1     DIGOXIN 0.25 MG OR TABS 1 TABLET DAILY       meloxicam (MOBIC) 15 MG tablet TAKE 1 TABLET BY MOUTH EVERY DAY 30 tablet 0     methylPREDNISolone (MEDROL) 4 MG tablet therapy pack Follow Package Directions 21 tablet 0     VASOTEC 5 MG OR TABS 1 tab twice daily       VITAMIN D, CHOLECALCIFEROL, PO Take by mouth daily         No Known Allergies    Family History   Problem Relation Age of  Onset     Diabetes Mother      Hypertension Mother      Social History     Socioeconomic History     Marital status:      Spouse name: Not on file     Number of children: Not on file     Years of education: Not on file     Highest education level: Not on file   Occupational History     Not on file   Tobacco Use     Smoking status: Never Smoker     Smokeless tobacco: Never Used   Substance and Sexual Activity     Alcohol use: Yes     Comment: weekends     Drug use: No     Sexual activity: Not on file   Other Topics Concern     Not on file   Social History Narrative     Not on file     Social Determinants of Health     Financial Resource Strain: Not on file   Food Insecurity: Not on file   Transportation Needs: Not on file   Physical Activity: Not on file   Stress: Not on file   Social Connections: Not on file   Intimate Partner Violence: Not on file   Housing Stability: Not on file       Additional medical/Social/Surgical histories reviewed in [facility] and updated as appropriate.     REVIEW OF SYSTEMS (2/7/2022)  10 point ROS of systems including Constitutional, Eyes, Respiratory, Cardiovascular, Gastroenterology, Genitourinary, Integumentary, Musculoskeletal, Psychiatric, Allergic/Immunologic were all negative except for pertinent positives noted in my HPI.     PHYSICAL EXAM  VSS    General  - normal appearance, in no obvious distress  HEENT  - conjunctivae not injected, moist mucous membranes, normocephalic/atraumatic head, ears normal appearance, no lesions, mouth normal appearance, no scars, normal dentition and teeth present  CV  - normal peripheral perfusion  Pulm  - normal respiratory pattern, non-labored  Musculoskeletal - lumbar spine  - stance: normal gait without limp, no obvious leg length discrepancy, normal heel and toe walk  - inspection: normal bone and joint alignment, no obvious scoliosis  - palpation: no paravertebral or bony tenderness, except along lumbar paraspinal muscles  - ROM: flexion  exacerbates pain, normal extension, sidebending, rotation, cause discomfort in low back  - strength: lower extremities 5/5 in all planes  - special tests:  (+) straight leg raise  (+) slump test  Neuro  - patellar and Achilles DTRs 2+ bilaterally, some lower extremity sensory deficit throughout L5 distribution, grossly normal coordination, normal muscle tone  Skin  - no ecchymosis, erythema, warmth, or induration, no obvious rash  Psych  - interactive, appropriate, normal mood and affect  Left knee: has mild swelling, pain to palpation over medial joint and PF joint, and pain with flexion and patellar compression  Right knee: has some ttp over medial joint, and PF joint, not as bad as left knee  ASSESSMENT & PLAN  55 yo male with left knee arthritis, lumbar ddd, radicular pain, stable, not resolved, and right knee djd    I independently reviewed the following imaging studies:  Lumbar xray: shows ddd  Discussed and ordered lumbar MRI due to severity of symptoms and concern for herniated disc lumbar  Left and right knee xrays: shows djd    After a 20 minute discussion and examination, we decided to perform a same day injection for therapeutic purposes for his left knee as planned with euflexxa #1  F/u for repeat euflexxa  And   Appropriate PPE was utilized for prevention of spread of Covid-19.  Kee Hood MD, CAQSM  PROCEDURE:       Left      Knee joint injection   The patient was apprised of the risks and the benefits of the procedure and consented and a written consent was signed.   The patient s  KNEE was sterilely prepped with chloraprep.   The patient was injected with euflexxa syringe into the knee with a 1.5-inch 22-gauge needle at the_superiorlateral aspect of their knee joint  There were no complications. The patient tolerated the procedure well. There was minimal bleeding.   The patient was instructed to ice the knee upon leaving clinic and refrain from overuse over the next 3 days.   The patient was  instructed to go to the emergency room with any usual pain, swelling, or redness occurred in the injected area.   The patient was given a followup appointment to evaluate response to the injection to their increased range of motion and reduction of pain.  Follow up for euflexxa   Dr Kee Hood

## 2022-02-16 DIAGNOSIS — M25.562 LEFT KNEE PAIN: Primary | ICD-10-CM

## 2022-02-18 NOTE — TELEPHONE ENCOUNTER
RECORDS RECEIVED FROM: left knee referred by Dr. Kee Hood   DATE RECEIVED: Mar 9, 2022     NOTES STATUS DETAILS   OFFICE NOTE from referring provider Internal  Kee Hood MD          MEDICATION LIST Internal    XRAYS (IMAGES & REPORTS) Internal 3/9/22 ODERED  10/11/21

## 2022-02-23 ENCOUNTER — OFFICE VISIT (OUTPATIENT)
Dept: ORTHOPEDICS | Facility: CLINIC | Age: 57
End: 2022-02-23
Payer: COMMERCIAL

## 2022-02-23 DIAGNOSIS — M17.12 ARTHRITIS OF LEFT KNEE: Primary | ICD-10-CM

## 2022-02-23 PROCEDURE — 20610 DRAIN/INJ JOINT/BURSA W/O US: CPT | Mod: LT | Performed by: PREVENTIVE MEDICINE

## 2022-02-23 PROCEDURE — 99207 PR DROP WITH A PROCEDURE: CPT | Performed by: PREVENTIVE MEDICINE

## 2022-02-23 RX ORDER — LIDOCAINE HYDROCHLORIDE 10 MG/ML
3 INJECTION, SOLUTION INFILTRATION; PERINEURAL
Status: SHIPPED | OUTPATIENT
Start: 2022-02-23

## 2022-02-23 RX ORDER — HYALURONATE SODIUM 10 MG/ML
20 SYRINGE (ML) INTRAARTICULAR
Status: SHIPPED | OUTPATIENT
Start: 2022-02-23

## 2022-02-23 RX ADMIN — Medication 20 MG: at 16:39

## 2022-02-23 RX ADMIN — LIDOCAINE HYDROCHLORIDE 3 ML: 10 INJECTION, SOLUTION INFILTRATION; PERINEURAL at 16:39

## 2022-02-23 NOTE — LETTER
2/23/2022         RE: Arie Hahn Jr.  [address] [facility] [address]        Dear Colleague,    Thank you for referring your patient, Arie Hahn Jr., to the [facility] [place]. Please see a copy of my visit note below.    Arie returns for left knee injection with euflexxa #2  Diagnosis: left knee arthritis    PROCEDURE:       left     Knee joint injection   The patient was apprised of the risks and the benefits of the procedure and consented and a written consent was signed.   The patient s  KNEE was sterilely prepped with chloraprep.   The patient was injected with euflexxa into the knee with a 1.5-inch 22-gauge needle at the_superiorlateral aspect of their knee joint  There were no complications. The patient tolerated the procedure well. There was minimal bleeding.   The patient was instructed to ice the knee upon leaving clinic and refrain from overuse over the next 3 days.   The patient was instructed to go to the emergency room with any usual pain, swelling, or redness occurred in the injected area.   The patient was given a followup appointment to evaluate response to the injection to their increased range of motion and reduction of pain.  Follow up for euflexxa #3    We did review his lumbar MRI. Symptoms are stable for now, given HEP, and reviewed his ddd, disc herniations and possible treatment plans which he wants to hold off and discuss in the future.  Dr Kee Hood      Large Joint Injection: L knee joint    Date/Time: 2/23/2022 4:39 PM  Performed by: Kee Hood MD  Authorized by: Kee Hood MD     Indications:  Pain and osteoarthritis  Needle Size:  22 G  Guidance: landmark guided    Approach:  Superolateral  Location:  Knee      Medications:  20 mg sodium hyaluronate 20 MG/2ML; 3 mL lidocaine 1 %  Outcome:  Tolerated well, no immediate complications  Procedure discussed: discussed risks, benefits, and alternatives     Consent Given by:  Patient  Timeout: timeout called immediately prior to procedure    Prep: patient was prepped and draped in usual sterile fashion              Again, thank you for allowing me to participate in the care of your patient.        Sincerely,        Kee Hood MD

## 2022-02-23 NOTE — NURSING NOTE
[address] [address]  Dept: [phone]  ______________________________________________________________________________    Patient: Arie Hahn Jr.   : 1965   MRN: [id number]   2022    INVASIVE PROCEDURE SAFETY CHECKLIST    Date: 2022   Procedure: Left Knee Euflexxa Injection #2  Patient Name: Arie Hahn Jr.  MRN: [id number]  YOB: 1965    Action: Complete sections as appropriate. Any discrepancy results in a HARD COPY until resolved.     PRE PROCEDURE:  Patient ID verified with 2 identifiers (name and  or MRN): Yes  Procedure and site verified with patient/designee (when able): Yes  Accurate consent documentation in medical record: Yes  H&P (or appropriate assessment) documented in medical record: Yes  H&P must be up to 20 days prior to procedure and updates within 24 hours of procedure as applicable: NA  Relevant diagnostic and radiology test results appropriately labeled and displayed as applicable: NA  Procedure site(s) marked with provider initials: NA    TIMEOUT:  Time-Out performed immediately prior to starting procedure, including verbal and active participation of all team members addressing the following:Yes  * Correct patient identify  * Confirmed that the correct side and site are marked  * An accurate procedure consent form  * Agreement on the procedure to be done  * Correct patient position  * Relevant images and results are properly labeled and appropriately displayed  * The need to administer antibiotics or fluids for irrigation purposes during the procedure as applicable   * Safety precautions based on patient history or medication use    DURING PROCEDURE: Verification of correct person, site, and procedures any time the responsibility for care of the patient is transferred to another member of the care team.       Prior to injection, verified patient identity using patient's  name and date of birth.  Due to injection administration, patient instructed to remain in clinic for 15 minutes  afterwards, and to report any adverse reaction to me immediately.    Joint injection was performed.      Drug Amount Wasted:  None.  Vial/Syringe: Syringe  Expiration Date:  01/31/2023      Rosario Hernandez, ATC  February 23, 2022

## 2022-02-23 NOTE — PROGRESS NOTES
Arie returns for left knee injection with euflexxa #2  Diagnosis: left knee arthritis    PROCEDURE:       left     Knee joint injection   The patient was apprised of the risks and the benefits of the procedure and consented and a written consent was signed.   The patient s  KNEE was sterilely prepped with chloraprep.   The patient was injected with euflexxa into the knee with a 1.5-inch 22-gauge needle at the_superiorlateral aspect of their knee joint  There were no complications. The patient tolerated the procedure well. There was minimal bleeding.   The patient was instructed to ice the knee upon leaving clinic and refrain from overuse over the next 3 days.   The patient was instructed to go to the emergency room with any usual pain, swelling, or redness occurred in the injected area.   The patient was given a followup appointment to evaluate response to the injection to their increased range of motion and reduction of pain.  Follow up for euflexxa #3    We did review his lumbar MRI. Symptoms are stable for now, given HEP, and reviewed his ddd, disc herniations and possible treatment plans which he wants to hold off and discuss in the future.  Dr Kee Hood      Large Joint Injection: L knee joint    Date/Time: 2/23/2022 4:39 PM  Performed by: Kee Hood MD  Authorized by: Kee Hood MD     Indications:  Pain and osteoarthritis  Needle Size:  22 G  Guidance: landmark guided    Approach:  Superolateral  Location:  Knee      Medications:  20 mg sodium hyaluronate 20 MG/2ML; 3 mL lidocaine 1 %  Outcome:  Tolerated well, no immediate complications  Procedure discussed: discussed risks, benefits, and alternatives    Consent Given by:  Patient  Timeout: timeout called immediately prior to procedure    Prep: patient was prepped and draped in usual sterile fashion

## 2022-03-02 ENCOUNTER — OFFICE VISIT (OUTPATIENT)
Dept: ORTHOPEDICS | Facility: CLINIC | Age: 57
End: 2022-03-02
Payer: COMMERCIAL

## 2022-03-02 DIAGNOSIS — M17.12 ARTHRITIS OF LEFT KNEE: Primary | ICD-10-CM

## 2022-03-02 PROCEDURE — 99207 PR DROP WITH A PROCEDURE: CPT | Performed by: PREVENTIVE MEDICINE

## 2022-03-02 PROCEDURE — 20610 DRAIN/INJ JOINT/BURSA W/O US: CPT | Mod: LT | Performed by: PREVENTIVE MEDICINE

## 2022-03-02 RX ORDER — HYALURONATE SODIUM 10 MG/ML
20 SYRINGE (ML) INTRAARTICULAR
Status: SHIPPED | OUTPATIENT
Start: 2022-03-02

## 2022-03-02 RX ADMIN — Medication 20 MG: at 09:37

## 2022-03-02 NOTE — NURSING NOTE
Arie Hahn Jr.'s chief complaint for this visit includes:  Chief Complaint   Patient presents with     RECHECK     Euflexxa injection #3     PCP: No Ref-Primary, Physician    Referring Provider:  No referring provider defined for this encounter.    There were no vitals taken for this visit.  Data Unavailable     Do you need any medication refills at today's visit? NO    Joyce Lucas MA, ATC

## 2022-03-02 NOTE — PROGRESS NOTES
Large Joint Injection/Arthocentesis: L knee joint    Date/Time: 3/2/2022 9:37 AM  Performed by: Kee Hood MD  Authorized by: Kee Hood MD     Indications:  Pain and osteoarthritis  Needle Size:  22 G  Guidance: landmark guided    Approach:  Superolateral  Location:  Knee      Medications:  20 mg sodium hyaluronate 20 MG/2ML  Outcome:  Tolerated well, no immediate complications  Procedure discussed: discussed risks, benefits, and alternatives    Consent Given by:  Patient  Timeout: timeout called immediately prior to procedure    Prep: patient was prepped and draped in usual sterile fashion     NDC: [address]-4

## 2022-03-02 NOTE — PROGRESS NOTES
Arie returns for left knee injection with euflexxa #3  Doing well    Diagnosis: left knee arthritis    PROCEDURE:       left     Knee joint injection   The patient was apprised of the risks and the benefits of the procedure and consented and a written consent was signed.   The patient s  KNEE was sterilely prepped with chloraprep.   The patient was injected with euflexxa into the knee with a 1.5-inch 22-gauge needle at the_superiorlateral aspect of their knee joint  There were no complications. The patient tolerated the procedure well. There was minimal bleeding.   The patient was instructed to ice the knee upon leaving clinic and refrain from overuse over the next 3 days.   The patient was instructed to go to the emergency room with any usual pain, swelling, or redness occurred in the injected area.   The patient was given a followup appointment to evaluate response to the injection to their increased range of motion and reduction of pain.  Follow up for euflexxa in 6 months    We did review his lumbar MRI. Symptoms are stable for now, given HEP, and reviewed his ddd, disc herniations and possible treatment plans which he wants to hold off and discuss in the future.  Dr Kee Hood

## 2022-03-02 NOTE — LETTER
3/2/2022         RE: Arie Hahn Jr.  [address] [facility] [address]        Dear Colleague,    Thank you for referring your patient, Arie Hahn Jr., to the [facility] [place]. Please see a copy of my visit note below.    Arie returns for left knee injection with euflexxa #3  Doing well    Diagnosis: left knee arthritis    PROCEDURE:       left     Knee joint injection   The patient was apprised of the risks and the benefits of the procedure and consented and a written consent was signed.   The patient s  KNEE was sterilely prepped with chloraprep.   The patient was injected with euflexxa into the knee with a 1.5-inch 22-gauge needle at the_superiorlateral aspect of their knee joint  There were no complications. The patient tolerated the procedure well. There was minimal bleeding.   The patient was instructed to ice the knee upon leaving clinic and refrain from overuse over the next 3 days.   The patient was instructed to go to the emergency room with any usual pain, swelling, or redness occurred in the injected area.   The patient was given a followup appointment to evaluate response to the injection to their increased range of motion and reduction of pain.  Follow up for euflexxa in 6 months    We did review his lumbar MRI. Symptoms are stable for now, given HEP, and reviewed his ddd, disc herniations and possible treatment plans which he wants to hold off and discuss in the future.  Dr Kee Hood                Large Joint Injection/Arthocentesis: L knee joint    Date/Time: 3/2/2022 9:37 AM  Performed by: Kee Hood MD  Authorized by: Kee Hood MD     Indications:  Pain and osteoarthritis  Needle Size:  22 G  Guidance: landmark guided    Approach:  Superolateral  Location:  Knee      Medications:  20 mg sodium hyaluronate 20 MG/2ML  Outcome:  Tolerated well, no immediate complications  Procedure discussed: discussed risks,  benefits, and alternatives    Consent Given by:  Patient  Timeout: timeout called immediately prior to procedure    Prep: patient was prepped and draped in usual sterile fashion     NDC: [address]-2            Again, thank you for allowing me to participate in the care of your patient.        Sincerely,        Kee Hood MD

## 2022-03-09 ENCOUNTER — ANCILLARY PROCEDURE (OUTPATIENT)
Dept: GENERAL RADIOLOGY | Facility: CLINIC | Age: 57
End: 2022-03-09
Attending: STUDENT IN AN ORGANIZED HEALTH CARE EDUCATION/TRAINING PROGRAM
Payer: COMMERCIAL

## 2022-03-09 ENCOUNTER — PRE VISIT (OUTPATIENT)
Dept: ORTHOPEDICS | Facility: CLINIC | Age: 57
End: 2022-03-09

## 2022-03-09 ENCOUNTER — OFFICE VISIT (OUTPATIENT)
Dept: ORTHOPEDICS | Facility: CLINIC | Age: 57
End: 2022-03-09
Payer: COMMERCIAL

## 2022-03-09 VITALS — WEIGHT: 315 LBS | BODY MASS INDEX: 44.1 KG/M2 | HEIGHT: 71 IN

## 2022-03-09 DIAGNOSIS — M25.562 LEFT KNEE PAIN: ICD-10-CM

## 2022-03-09 DIAGNOSIS — M17.12 OSTEOARTHROSIS, LOCALIZED, PRIMARY, KNEE, LEFT: Primary | ICD-10-CM

## 2022-03-09 PROCEDURE — 99204 OFFICE O/P NEW MOD 45 MIN: CPT | Performed by: STUDENT IN AN ORGANIZED HEALTH CARE EDUCATION/TRAINING PROGRAM

## 2022-03-09 PROCEDURE — 77073 BONE LENGTH STUDIES: CPT | Performed by: STUDENT IN AN ORGANIZED HEALTH CARE EDUCATION/TRAINING PROGRAM

## 2022-03-09 PROCEDURE — 73560 X-RAY EXAM OF KNEE 1 OR 2: CPT | Mod: XU | Performed by: RADIOLOGY

## 2022-03-09 RX ORDER — ENALAPRIL MALEATE 5 MG/1
5 TABLET ORAL
COMMUNITY
Start: 2021-07-19 | End: 2023-04-21

## 2022-03-09 RX ORDER — ATENOLOL 50 MG/1
1 TABLET ORAL 2 TIMES DAILY
COMMUNITY
Start: 2021-03-12 | End: 2023-04-21

## 2022-03-09 RX ORDER — DIGOXIN 250 MCG
1 TABLET ORAL DAILY
COMMUNITY
Start: 2022-02-17 | End: 2023-04-21

## 2022-03-09 NOTE — LETTER
3/9/2022         RE: Arie Hahn Jr.  [address] [facility] [address]        Dear Colleague,    Thank you for referring your patient, Arie Hahn Jr., to the [facility] [place]. Please see a copy of my visit note below.        [facility]  Orthopaedic Surgery Consultation by Maury Lopez M.D.    Arie Hahn Jr. MRN# [id number]   Age: 56 year old YOB: 1965     Requesting physician: No ref. provider found  No Ref-Primary, Physician     Background history:  DX:  1. Morbid obesity  2. Atrial fibrillation for which no anticoagulation  3. ACL tear right knee    TREATMENTS:  1. 11/17/2008, ACL reconstruction right with allograft, Dr. Zapien          History of Present Illness:   56 year old male who was referred to our clinic by Dr. Hood because of chronic left knee pain.  This pain has been present for multiple years.  Potentially started after a fall from ladder.  Pain has been progressive since.  It is felt on the medial knee mostly but radiates throughout.  Today the pain radiates down his shin.  Patient denies the presence of significant lower back or hip/groin pain.  No motor or sensory deficits of left lower extremity.  Patient is experiencing night pain and initiation stiffness and soreness.  He reports effusion and grinding and clicking of the knee.  No clear instability.  To mitigate the pain he uses over-the-counter analgesics.  He has attempted to reduce his weight by himself, he has had multiple injections with cortisone and more recently hyaluronic acid.  He is here to discuss what a total knee replacement surgery would entail.    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  Living situation: Lives with wife. 2 story home.  Hobbies / Sports: Used to  softball. Fish and hunt. ATV driving. Going to the cabin.    Smoking: No  Alcohol: Yes  Illicit drug use: No         Physical Exam:     EXAMINATION  "pertinent findings:   PSYCH: Pleasant, healthy-appearing, alert, oriented x3, cooperative. Normal mood and affect.  VITAL SIGNS: Height 1.803 m (5' 11\"), weight 145.2 kg (320 lb).  Reviewed nursing intake notes.   There is no height or weight on file to calculate BMI.  RESP: non labored breathing   ABD: benign, soft, non-tender, no acute peritoneal findings  SKIN: grossly normal   LYMPHATIC: grossly normal, no adenopathy, no extremity edema  NEURO: grossly normal , no motor deficits  VASCULAR: satisfactory perfusion of all extremities   MUSCULOSKELETAL:   Alignment: Neutral alignment of left lower extremity.  Gait: Slight antalgic gait over left lower extremity.  Appears to be unable to fully extend the knee.    Left hip exhibits a full range of motion.  No pain upon rotations.  Lasegue's test is negative.  No tenderness to palpation of the greater trochanteric region.    L knee: -0-0  .  Deep flexion is recognizably painful.  Straight leg raise +. No redness, warmth or skin changes present. Effusion No. Ligamentously stable in both ML and AP direction. Normal PF tracking without crepitus. Rabot +. Meniscal provocation tests are negative.  There is some tenderness to palpation over the joint line.     Left LE:   Thigh and leg compartments soft and compressible   +Quad/TA/GSC/FHL/EHL   SILT DP/SP/Michael/Saph/Tib nerve distributions   Palpable dorsalis pedis pulse          Data:   All laboratory data reviewed  All imaging studies reviewed by me personally.    XR knee left 3/9/2022 and 10/11/2021:  My interpretation: Severe tricompartmental osteoarthritic changes most notably in medial and patellofemoral compartments.  Presence of significant joint space narrowing, marginal osteophytes, sclerosis and subchondral cysts.    XR alignment films 3/9/2022:  My interpretation: Neutral alignment of bilateral lower extremities.         Assessment and Plan:   Assessment:  56-year-old male presenting with chronic left knee " pain due to severe osteoarthritic changes.     Plan:  I had a long discussion with the patient regarding ongoing management options.  Reviewed surgical and nonsurgical treatments.  The non-surgical options include activity modification, weight reduction, pain medication, PT, bracing and injection therapy.  We discussed that we could await the final result of the hyaluronic acid.  Given patient's wear pattern  bracing does not seem to be beneficial for him.        As for surgery we discussed the option of total knee replacement surgery. We reviewed total knee replacement in detail including the procedure, the implants, the recovery process, and long-term outcomes.  We reviewed that the risks of the surgery include but are not limited to infection, wound problems, stiffness, persistent pain, swelling, clicking, loosening, revision surgery.  We also reviewed less common risks such as neurovascular injury fracture, and other implant-related issues.  We reviewed other medical complications such as a blood clot.  We discussed that the vast majority of cases have a highly successful outcome.  However there is a small subset of patients that do experience complications or problems following the knee replacement and these problems can be very debilitating and painful and sometimes do not improve.       Based on our discussion patient articulates that he would like to attempt continued nonoperative treatment measures.  He will continue to work on his weight reduction and a referral to the comprehensive weight loss clinic was provided.  He will continue to use over-the-counter pain medication and will await the full effect of the hyaluronic acid injections.  Depending on the time interval he will either follow-up with Dr. Hood for repeat injection or myself for potential knee replacement surgery.  All questions were answered and patient understands and agrees to the treatment plan as set forth.       More  information on joint replacements can be found on : [email or web address]    Thank you for your referral.    Maury Lopez MD, PhD     Adult Reconstruction  [facility] of Orthopaedic Surgery  Pager [phone]      DATA for DOCUMENTATION:         Past Medical History:     Patient Active Problem List   Diagnosis     Morbid obesity (H)     Left knee pain     Past Medical History:   Diagnosis Date     Atrial fibrillation (H)     lone     Torn meniscus     left knee       Also see scanned health assessment forms.       Past Surgical History:     Past Surgical History:   Procedure Laterality Date     ARTHROSCOPY KNEE WITH LATERAL MENISCECTOMY Left 10/22/2014    Procedure: ARTHROSCOPY KNEE WITH LATERAL MENISCECTOMY;  Surgeon: Alok Zapien MD;  Location: [facility] OR     C STOMACH SURGERY PROCEDURE UNLISTED      appe 16 yrs old     C TOTAL KNEE ARTHROPLASTY      reconstuction            Social History:     Social History     Socioeconomic History     Marital status:      Spouse name: Not on file     Number of children: Not on file     Years of education: Not on file     Highest education level: Not on file   Occupational History     Not on file   Tobacco Use     Smoking status: Never Smoker     Smokeless tobacco: Never Used   Substance and Sexual Activity     Alcohol use: Yes     Comment: weekends     Drug use: No     Sexual activity: Not on file   Other Topics Concern     Not on file   Social History Narrative     Not on file     Social Determinants of Health     Financial Resource Strain: Not on file   Food Insecurity: Not on file   Transportation Needs: Not on file   Physical Activity: Not on file   Stress: Not on file   Social Connections: Not on file   Intimate Partner Violence: Not on file   Housing Stability: Not on file            Family History:       Family History   Problem Relation Age of Onset     Diabetes  Mother      Hypertension Mother             Medications:     Current Outpatient Medications   Medication Sig     ATENOLOL 50 MG OR TABS 1 tab twice daily     diclofenac (VOLTAREN) 1 % GEL Place onto the skin 4 times daily Apply to left knee 4 times daily as needed, wash hands after application.     diclofenac (VOLTAREN) 1 % topical gel Place 4 g onto the skin 4 times daily     DIGOXIN 0.25 MG OR TABS 1 TABLET DAILY     meloxicam (MOBIC) 15 MG tablet TAKE 1 TABLET BY MOUTH EVERY DAY     methylPREDNISolone (MEDROL) 4 MG tablet therapy pack Follow Package Directions     VASOTEC 5 MG OR TABS 1 tab twice daily     VITAMIN D, CHOLECALCIFEROL, PO Take by mouth daily     Current Facility-Administered Medications   Medication     methylPREDNISolone (DEPO-MEDROL) injection 40 mg     sodium hyaluronate (EUFLEXXA) injection 20 mg     sodium hyaluronate (EUFLEXXA) injection 20 mg     sodium hyaluronate (EUFLEXXA) injection 20 mg     sodium hyaluronate (EUFLEXXA) injection 20 mg     triamcinolone (KENALOG-40) injection 40 mg     triamcinolone (KENALOG-40) injection 40 mg              Review of Systems:   A comprehensive 10 point review of systems (constitutional, ENT, cardiac, peripheral vascular, lymphatic, respiratory, GI, , Musculoskeletal, skin, Neurological) was performed and found to be negative except as described in this note.     See intake form completed by patient

## 2022-07-12 ENCOUNTER — TELEPHONE (OUTPATIENT)
Dept: ORTHOPEDICS | Facility: CLINIC | Age: 57
End: 2022-07-12

## 2022-07-12 NOTE — TELEPHONE ENCOUNTER
Good Afternoon,    Patient is scheduled on 7/20/2022 for a follow up after last injection as a procedure visit. Is patient getting another injection for his knee? If the patient is getting another euflexxa injection, please put in order. Please reach out if you have any questions.    Thanks!    Bella Jackson    Financial Counselor  [address] [place] [address]  Phone- [phone]  Fax- [phone]

## 2022-07-20 ENCOUNTER — OFFICE VISIT (OUTPATIENT)
Dept: ORTHOPEDICS | Facility: CLINIC | Age: 57
End: 2022-07-20
Payer: COMMERCIAL

## 2022-07-20 DIAGNOSIS — M17.12 ARTHRITIS OF LEFT KNEE: ICD-10-CM

## 2022-07-20 DIAGNOSIS — M51.16 LUMBAR DISC HERNIATION WITH RADICULOPATHY: Primary | ICD-10-CM

## 2022-07-20 PROCEDURE — 20610 DRAIN/INJ JOINT/BURSA W/O US: CPT | Mod: LT | Performed by: PREVENTIVE MEDICINE

## 2022-07-20 PROCEDURE — 99214 OFFICE O/P EST MOD 30 MIN: CPT | Mod: 25 | Performed by: PREVENTIVE MEDICINE

## 2022-07-20 RX ADMIN — TRIAMCINOLONE ACETONIDE 40 MG: 40 INJECTION, SUSPENSION INTRA-ARTICULAR; INTRAMUSCULAR at 08:11

## 2022-07-20 NOTE — LETTER
7/20/2022         RE: Arie Hahn Jr.  [address] [facility] [address]        Dear Colleague,    Thank you for referring your patient, Arie Hahn Jr., to the [facility] [place]. Please see a copy of my visit note below.    HISTORY OF PRESENT ILLNESS  Mr. Hahn is a pleasant 56 year old year old male who presents to clinic today with   Arie explains that it has been a few months since his last injection (January) and his knee is beginning to hurt again, he wants to do another injection today  He also reports that his low back and pain that travelled into his legs has bothered him more  Location: left knee   Quality:  achy pain    Severity: 10/10 at worst    Duration: see above  Timing: occurs intermittently  Context: occurs while exercising and lifting  Modifying factors:  resting and non-use makes it better, movement and use makes it worse  Associated signs & symptoms: some effusion, radicular pain into legs    MEDICAL HISTORY  Patient Active Problem List   Diagnosis     Morbid obesity (H)     Left knee pain       Current Outpatient Medications   Medication Sig Dispense Refill     atenolol (TENORMIN) 50 MG tablet Take 1 tablet by mouth 2 times daily       ATENOLOL 50 MG OR TABS 1 tab twice daily       diclofenac (VOLTAREN) 1 % GEL Place onto the skin 4 times daily Apply to left knee 4 times daily as needed, wash hands after application. 1 Tube 1     diclofenac (VOLTAREN) 1 % topical gel Place 4 g onto the skin 4 times daily 1 Tube 1     digoxin (LANOXIN) 250 MCG tablet Take 1 tablet by mouth daily       DIGOXIN 0.25 MG OR TABS 1 TABLET DAILY       enalapril (VASOTEC) 5 MG tablet Take 5 mg by mouth       meloxicam (MOBIC) 15 MG tablet TAKE 1 TABLET BY MOUTH EVERY DAY 30 tablet 0     methylPREDNISolone (MEDROL) 4 MG tablet therapy pack Follow Package Directions 21 tablet 0     VASOTEC 5 MG OR TABS 1 tab twice daily       VITAMIN D, CHOLECALCIFEROL, PO Take by  mouth daily         No Known Allergies    Family History   Problem Relation Age of Onset     Diabetes Mother      Hypertension Mother      Social History     Socioeconomic History     Marital status:    Tobacco Use     Smoking status: Never Smoker     Smokeless tobacco: Never Used   Substance and Sexual Activity     Alcohol use: Yes     Comment: weekends     Drug use: No       Additional medical/Social/Surgical histories reviewed in [facility] and updated as appropriate.     REVIEW OF SYSTEMS (7/20/2022)  10 point ROS of systems including Constitutional, Eyes, Respiratory, Cardiovascular, Gastroenterology, Genitourinary, Integumentary, Musculoskeletal, Psychiatric, Allergic/Immunologic were all negative except for pertinent positives noted in my HPI.     PHYSICAL EXAM  VSS  General  - normal appearance, in no obvious distress  HEENT  - conjunctivae not injected, moist mucous membranes, normocephalic/atraumatic head, ears normal appearance, no lesions, mouth normal appearance, no scars, normal dentition and teeth present  CV  - normal peripheral perfusion  Pulm  - normal respiratory pattern, non-labored  Musculoskeletal - lumbar spine  - stance: normal gait without limp, no obvious leg length discrepancy, normal heel and toe walk  - inspection: normal bone and joint alignment, no obvious scoliosis  - palpation: no paravertebral or bony tenderness  - ROM: flexion exacerbates pain, normal extension, sidebending, rotation  - strength: lower extremities 5/5 in all planes  - special tests:  (+) straight leg raise  (+) slump test  Neuro  - patellar and Achilles DTRs 2+ bilaterally, some lower extremity sensory deficit throughout L5 distribution, grossly normal coordination, normal muscle tone  Skin  - no ecchymosis, erythema, warmth, or induration, no obvious rash  Psych  - interactive, appropriate, normal mood and affect  Left knee: has pain with flexion, patellar compression, medial joint line ttp  ASSESSMENT & PLAN  56  yo male with left knee arthritis, lumbar ddd, disc herniations, radicular pain, worse    I independently reviewed the following imaging studies:  xrays knee: shows djd  Reviewed lumbar MRI: shows ddd, disc herniations  Discussed and ordered lumbar GWYN  After a 20 minute discussion and examination, we decided to perform a same day injection for diagnostic and therapeutic purposes for left knee    Appropriate PPE was utilized for prevention of spread of Covid-19.  Kee Hood MD, CAQSM  PROCEDURE:        left    Knee joint injection   The patient was apprised of the risks and the benefits of the procedure and consented and a written consent was signed.   The patient s  KNEE was sterilely prepped with chloraprep.   40 mg of triamcinolone suspension was drawn up into a 5 mL syringe with 4 mL of 1% lidocaine  The patient was injected into the knee with a 1.5-inch 22-gauge needle at the_superiorlateral aspect of their knee joint  There were no complications. The patient tolerated the procedure well. There was minimal bleeding.   The patient was instructed to ice the knee upon leaving clinic and refrain from overuse over the next 3 days.   The patient was instructed to go to the emergency room with any usual pain, swelling, or redness occurred in the injected area.   The patient was given a followup appointment to evaluate response to the injection to their increased range of motion and reduction of pain.      Large Joint Injection/Arthocentesis: L knee joint    Date/Time: 7/20/2022 8:11 AM  Performed by: Kee Hood MD  Authorized by: Kee Hood MD     Indications:  Pain, osteoarthritis and diagnostic evaluation  Needle Size:  22 G  Guidance: landmark guided    Approach:  Superolateral  Location:  Knee      Medications:  40 mg triamcinolone 40 MG/ML  Outcome:  Tolerated well, no immediate complications  Procedure discussed: discussed risks, benefits, and alternatives    Consent Given by:   Patient  Timeout: timeout called immediately prior to procedure    Prep: patient was prepped and draped in usual sterile fashion              Again, thank you for allowing me to participate in the care of your patient.        Sincerely,        Kee Hood MD

## 2022-07-29 ENCOUNTER — ANCILLARY PROCEDURE (OUTPATIENT)
Dept: GENERAL RADIOLOGY | Facility: CLINIC | Age: 57
End: 2022-07-29
Attending: PREVENTIVE MEDICINE
Payer: COMMERCIAL

## 2022-07-29 DIAGNOSIS — M51.16 LUMBAR DISC HERNIATION WITH RADICULOPATHY: ICD-10-CM

## 2022-07-29 PROCEDURE — 62323 NJX INTERLAMINAR LMBR/SAC: CPT | Performed by: RADIOLOGY

## 2022-07-29 RX ORDER — BUPIVACAINE HYDROCHLORIDE 2.5 MG/ML
5 INJECTION, SOLUTION INFILTRATION; PERINEURAL ONCE
Status: COMPLETED | OUTPATIENT
Start: 2022-07-29 | End: 2022-07-29

## 2022-07-29 RX ORDER — IOPAMIDOL 408 MG/ML
10 INJECTION, SOLUTION INTRATHECAL ONCE
Status: COMPLETED | OUTPATIENT
Start: 2022-07-29 | End: 2022-07-29

## 2022-07-29 RX ORDER — METHYLPREDNISOLONE ACETATE 80 MG/ML
80 INJECTION, SUSPENSION INTRA-ARTICULAR; INTRALESIONAL; INTRAMUSCULAR; SOFT TISSUE ONCE
Status: COMPLETED | OUTPATIENT
Start: 2022-07-29 | End: 2022-07-29

## 2022-07-29 RX ADMIN — BUPIVACAINE HYDROCHLORIDE 10 MG: 2.5 INJECTION, SOLUTION INFILTRATION; PERINEURAL at 13:38

## 2022-07-29 RX ADMIN — METHYLPREDNISOLONE ACETATE 80 MG: 80 INJECTION, SUSPENSION INTRA-ARTICULAR; INTRALESIONAL; INTRAMUSCULAR; SOFT TISSUE at 13:38

## 2022-07-29 RX ADMIN — IOPAMIDOL 2 ML: 408 INJECTION, SOLUTION INTRATHECAL at 13:38

## 2022-08-07 RX ORDER — TRIAMCINOLONE ACETONIDE 40 MG/ML
40 INJECTION, SUSPENSION INTRA-ARTICULAR; INTRAMUSCULAR
Status: SHIPPED | OUTPATIENT
Start: 2022-07-20

## 2022-08-11 ENCOUNTER — VIRTUAL VISIT (OUTPATIENT)
Dept: ORTHOPEDICS | Facility: CLINIC | Age: 57
End: 2022-08-11
Payer: COMMERCIAL

## 2022-08-11 DIAGNOSIS — M51.16 LUMBAR DISC HERNIATION WITH RADICULOPATHY: Primary | ICD-10-CM

## 2022-08-11 DIAGNOSIS — M17.12 ARTHRITIS OF LEFT KNEE: ICD-10-CM

## 2022-08-11 PROCEDURE — 99214 OFFICE O/P EST MOD 30 MIN: CPT | Mod: TEL | Performed by: PREVENTIVE MEDICINE

## 2022-08-11 NOTE — PROGRESS NOTES
Patient is a  56   year old who is being evaluated via a billable telephone visit.      What phone number would you like to be contacted at? CELL  How would you like to obtain your AVS? SASCHA        Subjective   Patient is a  56   year old who presents by phone call visit for the following:   F/u for lumbar injection and knee pain  Doing much better  Wants to discuss next steps    HPI       Review of Systems   Constitutional, HEENT, cardiovascular, pulmonary, gi and gu systems are negative, except as otherwise noted.      Objective           Vitals:  No vitals were obtained today due to virtual visit.    Physical Exam   healthy, alert and no distress  PSYCH: Alert and oriented times 3; coherent speech, normal   rate and volume, able to articulate logical thoughts, able   to abstract reason, no tangential thoughts, no hallucinations   or delusions  His affect is normal  RESP: No cough, no audible wheezing, able to talk in full sentences  Remainder of exam unable to be completed due to telephone visits    Assessment/Plan  55 yo male with left knee arthritis, djd, stable, not resolved, and lumbar ddd, disc herniations, radicular pain, improved      I independently reviewed the following imaging studies and discussed with patient:  Left knee xrays: shows djd  Discussed and ordered euflexxa for left knee to repeat in Sept.        Phone call duration: 20 minutes  Phone call start: 810am  Phone call end: 830am  Dr Hood

## 2022-08-11 NOTE — LETTER
8/11/2022         RE: Arie Hahn Jr.  [address] [facility] [address]        Dear Colleague,    Thank you for referring your patient, Arie Hahn Jr., to the [facility] [place]. Please see a copy of my visit note below.    Patient is a  56   year old who is being evaluated via a billable telephone visit.      What phone number would you like to be contacted at? CELL  How would you like to obtain your AVS? MYCHART        Subjective   Patient is a  56   year old who presents by phone call visit for the following:   F/u for lumbar injection and knee pain  Doing much better  Wants to discuss next steps    HPI       Review of Systems   Constitutional, HEENT, cardiovascular, pulmonary, gi and gu systems are negative, except as otherwise noted.      Objective           Vitals:  No vitals were obtained today due to virtual visit.    Physical Exam   healthy, alert and no distress  PSYCH: Alert and oriented times 3; coherent speech, normal   rate and volume, able to articulate logical thoughts, able   to abstract reason, no tangential thoughts, no hallucinations   or delusions  His affect is normal  RESP: No cough, no audible wheezing, able to talk in full sentences  Remainder of exam unable to be completed due to telephone visits    Assessment/Plan  55 yo male with left knee arthritis, djd, stable, not resolved, and lumbar ddd, disc herniations, radicular pain, improved      I independently reviewed the following imaging studies and discussed with patient:  Left knee xrays: shows djd  Discussed and ordered euflexxa for left knee to repeat in Sept.        Phone call duration: 20 minutes  Phone call start: 810am  Phone call end: 830am  Dr Hood      Again, thank you for allowing me to participate in the care of your patient.        Sincerely,        Kee Hood MD

## 2022-08-11 NOTE — LETTER
8/11/2022         RE: Arie Hahn Jr.  [address] [facility] [address]        Dear Colleague,    Thank you for referring your patient, Arie Hahn Jr., to the [facility] [place]. Please see a copy of my visit note below.    Patient is a  56   year old who is being evaluated via a billable telephone visit.      What phone number would you like to be contacted at? CELL  How would you like to obtain your AVS? MYCHART        Subjective   Patient is a  56   year old who presents by phone call visit for the following:   F/u for lumbar injection and knee pain  Doing much better  Wants to discuss next steps    HPI       Review of Systems   Constitutional, HEENT, cardiovascular, pulmonary, gi and gu systems are negative, except as otherwise noted.      Objective           Vitals:  No vitals were obtained today due to virtual visit.    Physical Exam   healthy, alert and no distress  PSYCH: Alert and oriented times 3; coherent speech, normal   rate and volume, able to articulate logical thoughts, able   to abstract reason, no tangential thoughts, no hallucinations   or delusions  His affect is normal  RESP: No cough, no audible wheezing, able to talk in full sentences  Remainder of exam unable to be completed due to telephone visits    Assessment/Plan  55 yo male with left knee arthritis, djd, stable, not resolved, and lumbar ddd, disc herniations, radicular pain, improved      I independently reviewed the following imaging studies and discussed with patient:  Left knee xrays: shows djd  Discussed and ordered euflexxa for left knee to repeat in Sept.        Phone call duration: 20 minutes  Phone call start: 810am  Phone call end: 830am  Dr Hood      Again, thank you for allowing me to participate in the care of your patient.        Sincerely,        Kee Hood MD

## 2022-08-12 ENCOUNTER — TELEPHONE (OUTPATIENT)
Dept: ORTHOPEDICS | Facility: CLINIC | Age: 57
End: 2022-08-12
Payer: COMMERCIAL

## 2022-08-12 DIAGNOSIS — M17.12 ARTHRITIS OF LEFT KNEE: Primary | ICD-10-CM

## 2022-08-12 NOTE — TELEPHONE ENCOUNTER
8/12 Called and spoke to patient. He is currently scheduled for all three euflexxa injections.     Maris murrell Procedure   Orthopedics, Podiatry, Sports Medicine, ENT/Eye Specialties  [facility]   [phone]

## 2022-08-16 NOTE — TELEPHONE ENCOUNTER
Dr Hood and team, below are the [facility] guidelines: Pt does not qualify for coverage because it will not be 6 months between injections. Please advise.      Administrative Process  Prior authorization is not required from [facility] Administration for Intra-articular hyaluronan when request is for both a covered indication and a covered agent in this class (see below).    Off-label uses are considered investigational and not covered.    The provider and facility will be liable for payment of intra-articular hyaluronans not eligible for coverage unless all three of the following conditions have been met, in which case the member will be liable for payment:    The provider notifies the member that a specific service has been determined by [facility] to be investigational/experimental; and,  The member signs a waiver agreeing to pay for the specific non-covered service being rendered; and,  The claim has been billed with a GA modifier indicating such.      Coverage for intra-articular hyaluronan is subject to the indications listed below, and per your plan documents.    No prior authorization is required when used in the following manner:    After failure to respond to both of the following conservative therapy approaches:  Simple analgesics such as NSAIDs unless there is a contraindication to use, and  Conservative nonpharmacologic therapy such as strengthening, low-impact aerobic exercises, and neuromuscular education.  Repeat courses in patients with a documented response to previous intra-articular viscosupplementation and with courses spaced at least six months apart.  Use in joints other than the knee is considered investigational.    Coverage will only be provided for Synvisc, Synvisc One, and Euflexxa. No coverage will be provided for Durolane, Gel-One, Hyalgan, Monovisc, Orthovisc, Supartz, Gen-Visc 850, Gel-Syn, Hymovis, TriVisc, and Visco-3.    This policy does not address sodium  hyaluronate use for other conditions or procedures.    Definitions  Back to top    All viscosupplements are FDA-approved for treatment of pain in osteoarthritis (OA) of the knee in patients who have failed to respond adequately to conservative non-pharmacologic therapy, and to simple analgesics, e.g., acetaminophen.        Thank you,    MIRIAM Payton  Financial Counselor  [facility]  [address] [place] [address]  [phone]

## 2022-08-19 NOTE — TELEPHONE ENCOUNTER
Good morning,    Following up on the request below.    Thanks!    Bella Jackson    Financial Counselor  [address] [place] [address]  Phone- [phone]  Fax- [phone]

## 2022-08-24 NOTE — TELEPHONE ENCOUNTER
Good Morning,    Following up on the message.    Thanks!    Bella Jackson    Financial Counselor  [address] [place] [address]  Phone- [phone]  Fax- [phone]

## 2022-08-30 NOTE — TELEPHONE ENCOUNTER
Good Afternoon,    Following up on the message below because patient doesn't qualify for coverage for knee injections since it hasn't been 6 months between injections.     Thanks!    Bella Jackson    Financial Counselor  [address] [place] [address]  Phone- [phone]  Fax- [phone]

## 2022-09-06 NOTE — TELEPHONE ENCOUNTER
Dr Hood,  For anticipated coverage of upcoming Euflexxa injection please document response to previous 3/2/22 Euflexxa Injection.       Thank you,    MIRIAM Payton  Financial Counselor  [facility]  [address] [place] [address]  [phone]

## 2022-09-21 ENCOUNTER — OFFICE VISIT (OUTPATIENT)
Dept: ORTHOPEDICS | Facility: CLINIC | Age: 57
End: 2022-09-21
Payer: COMMERCIAL

## 2022-09-21 DIAGNOSIS — M17.12 ARTHRITIS OF LEFT KNEE: Primary | ICD-10-CM

## 2022-09-21 PROCEDURE — 20610 DRAIN/INJ JOINT/BURSA W/O US: CPT | Mod: LT | Performed by: PREVENTIVE MEDICINE

## 2022-09-21 RX ORDER — HYALURONATE SODIUM 10 MG/ML
20 SYRINGE (ML) INTRAARTICULAR
Status: SHIPPED | OUTPATIENT
Start: 2022-09-21

## 2022-09-21 RX ADMIN — Medication 20 MG: at 07:57

## 2022-09-21 ASSESSMENT — PAIN SCALES - GENERAL: PAINLEVEL: MODERATE PAIN (4)

## 2022-09-21 NOTE — NURSING NOTE
Chief Complaint   Patient presents with     Left Knee - Follow Up, Pain       There were no vitals filed for this visit.    There is no height or weight on file to calculate BMI.      ABENA Wood NREMT

## 2022-09-21 NOTE — PROGRESS NOTES
HISTORY OF PRESENT ILLNESS  Mr. Hahn is a pleasant 56 year old year old male who presents to clinic today for left knee euflexxa injection  Doing better  Diagnosis: left knee arthritis  PROCEDURE:          left  Knee joint injection with euflexxa    The patient was apprised of the risks and the benefits of the procedure and consented and a written consent was signed.   The patient s  KNEE was sterilely prepped with chloraprep.     The patient was injected with euflexxa syringe into the knee with a 1.5-inch 22-gauge needle at the_superiorlateral aspect of their knee joint    There were no complications. The patient tolerated the procedure well. There was minimal bleeding.   The patient was instructed to ice the knee upon leaving clinic and refrain from overuse over the next 3 days.   The patient was instructed to go to the emergency room with any usual pain, swelling, or redness occurred in the injected area.   The patient was given a followup appointment to evaluate response to the injection to their increased range of motion and reduction of pain.  Follow up for euflexxa  Dr Kee Hood    Large Joint Injection/Arthocentesis: L knee joint    Date/Time: 9/21/2022 7:57 AM  Performed by: Kee Hood MD  Authorized by: Kee Hood MD     Indications:  Pain and osteoarthritis  Needle Size:  22 G  Guidance: landmark guided    Approach:  Superolateral  Location:  Knee      Medications:  20 mg sodium hyaluronate 20 MG/2ML  Outcome:  Tolerated well, no immediate complications  Procedure discussed: discussed risks, benefits, and alternatives    Consent Given by:  Patient  Timeout: timeout called immediately prior to procedure    Prep: patient was prepped and draped in usual sterile fashion

## 2022-09-21 NOTE — NURSING NOTE
[facility]   ORTHOPEDICS & SPORTS MEDICINE  [address] [place] [address]  Dept: [phone]  ______________________________________________________________________________    Patient: Arie Hahn Jr.   : 1965   MRN: [id number]   2022    INVASIVE PROCEDURE SAFETY CHECKLIST    Date: 2022   Procedure: Left knee injection  Patient Name: Arie Hahn Jr.  MRN: [id number]  YOB: 1965    Action: Complete sections as appropriate. Any discrepancy results in a HARD COPY until resolved.     PRE PROCEDURE:  Patient ID verified with 2 identifiers (name and  or MRN): Yes  Procedure and site verified with patient/designee (when able): Yes  Accurate consent documentation in medical record: Yes  H&P (or appropriate assessment) documented in medical record: Yes  H&P must be up to 20 days prior to procedure and updates within 24 hours of procedure as applicable: NA  Relevant diagnostic and radiology test results appropriately labeled and displayed as applicable: NA  Procedure site(s) marked with provider initials: NA    TIMEOUT:  Time-Out performed immediately prior to starting procedure, including verbal and active participation of all team members addressing the following:Yes  * Correct patient identify  * Confirmed that the correct side and site are marked  * An accurate procedure consent form  * Agreement on the procedure to be done  * Correct patient position  * Relevant images and results are properly labeled and appropriately displayed  * The need to administer antibiotics or fluids for irrigation purposes during the procedure as applicable   * Safety precautions based on patient history or medication use    DURING PROCEDURE: Verification of correct person, site, and procedures any time the responsibility for care of the patient is transferred to another member of the care team.       Prior to injection, verified patient identity using patient's name and  date of birth.  Due to injection administration, patient instructed to remain in clinic for 15 minutes  afterwards, and to report any adverse reaction to me immediately.    Joint injection was performed.      Drug Amount Wasted:  None.  Vial/Syringe: Syringe  Expiration Date:  9/19/2023      Israel Gonzalez, EMT  September 21, 2022

## 2022-10-05 ENCOUNTER — OFFICE VISIT (OUTPATIENT)
Dept: ORTHOPEDICS | Facility: CLINIC | Age: 57
End: 2022-10-05
Payer: COMMERCIAL

## 2022-10-05 DIAGNOSIS — M51.16 LUMBAR DISC HERNIATION WITH RADICULOPATHY: ICD-10-CM

## 2022-10-05 DIAGNOSIS — M17.12 ARTHRITIS OF LEFT KNEE: Primary | ICD-10-CM

## 2022-10-05 PROCEDURE — 99214 OFFICE O/P EST MOD 30 MIN: CPT | Mod: 25 | Performed by: PREVENTIVE MEDICINE

## 2022-10-05 PROCEDURE — 20610 DRAIN/INJ JOINT/BURSA W/O US: CPT | Mod: LT | Performed by: PREVENTIVE MEDICINE

## 2022-10-05 RX ADMIN — Medication 20 MG: at 07:07

## 2022-10-05 NOTE — PROGRESS NOTES
HISTORY OF PRESENT ILLNESS  Mr. Hahn is a pleasant 56 year old year old male who presents to clinic today with   Arie explains that he is here today for left knee euflexxa #2    He is also having more low back pain that radiates into leg  Had relief from previous lumbar GWYN  Wants to discuss another  Location: left knee     Quality:  achy pain    Severity: 5/10 at worst    Duration: see above  Timing: occurs intermittently  Context: occurs while exercising and lifting and using the joint  Modifying factors:  resting and non-use makes it better, movement and use makes it worse  Associated signs & symptoms: lumbar radicular pain    MEDICAL HISTORY  Patient Active Problem List   Diagnosis     Morbid obesity (H)     Left knee pain       Current Outpatient Medications   Medication Sig Dispense Refill     atenolol (TENORMIN) 50 MG tablet Take 1 tablet by mouth 2 times daily       ATENOLOL 50 MG OR TABS 1 tab twice daily       diclofenac (VOLTAREN) 1 % GEL Place onto the skin 4 times daily Apply to left knee 4 times daily as needed, wash hands after application. 1 Tube 1     diclofenac (VOLTAREN) 1 % topical gel Place 4 g onto the skin 4 times daily 1 Tube 1     digoxin (LANOXIN) 250 MCG tablet Take 1 tablet by mouth daily       DIGOXIN 0.25 MG OR TABS 1 TABLET DAILY       enalapril (VASOTEC) 5 MG tablet Take 5 mg by mouth       meloxicam (MOBIC) 15 MG tablet TAKE 1 TABLET BY MOUTH EVERY DAY 30 tablet 0     methylPREDNISolone (MEDROL) 4 MG tablet therapy pack Follow Package Directions 21 tablet 0     VASOTEC 5 MG OR TABS 1 tab twice daily       VITAMIN D, CHOLECALCIFEROL, PO Take by mouth daily         No Known Allergies    Family History   Problem Relation Age of Onset     Diabetes Mother      Hypertension Mother      Social History     Socioeconomic History     Marital status:    Tobacco Use     Smoking status: Never Smoker     Smokeless tobacco: Never Used   Substance and Sexual Activity     Alcohol use: Yes      Comment: weekends     Drug use: No       Additional medical/Social/Surgical histories reviewed in EPIC and updated as appropriate.     REVIEW OF SYSTEMS (10/5/2022)  10 point ROS of systems including Constitutional, Eyes, Respiratory, Cardiovascular, Gastroenterology, Genitourinary, Integumentary, Musculoskeletal, Psychiatric, Allergic/Immunologic were all negative except for pertinent positives noted in my HPI.     PHYSICAL EXAM  VSS    General  - normal appearance, in no obvious distress  HEENT  - conjunctivae not injected, moist mucous membranes, normocephalic/atraumatic head, ears normal appearance, no lesions, mouth normal appearance, no scars, normal dentition and teeth present  CV  - normal peripheral perfusion  Pulm  - normal respiratory pattern, non-labored  Musculoskeletal - lumbar spine  - stance: normal gait without limp, no obvious leg length discrepancy, normal heel and toe walk  - inspection: normal bone and joint alignment, no obvious scoliosis  - palpation: no paravertebral or bony tenderness  - ROM: flexion exacerbates pain, normal extension, sidebending, rotation  - strength: lower extremities 5/5 in all planes  - special tests:  (+) straight leg raise  (+) slump test  Neuro  - patellar and Achilles DTRs 2+ bilaterally, lower extremity sensory deficit throughout L5 distribution, grossly normal coordination, normal muscle tone  Skin  - no ecchymosis, erythema, warmth, or induration, no obvious rash  Psych  - interactive, appropriate, normal mood and affect    ASSESSMENT & PLAN  57 yo male with lumbar ddd, disc herniations, left knee arthritis  I independently reviewed the following imaging studies:  Lumbar MRI: shows ddd, disc herniations  Discussed and ordered lumbar injection  After a 20 minute discussion and examination, we decided to perform a same day injection for therapeutic purposes for left knee with euflexxa as planned    Patient has been doing home exercise physical therapy program  for this problem      Appropriate PPE was utilized for prevention of spread of Covid-19.  Kee Hood MD, CAQSM    PROCEDURE:  left          Knee joint injection with euflexxa    The patient was apprised of the risks and the benefits of the procedure and consented and a written consent was signed.   The patient s  KNEE was sterilely prepped with chloraprep.     The patient was injected with euflexxa syringe into the knee with a 1.5-inch 22-gauge needle at the_superiorlateral aspect of their knee joint    There were no complications. The patient tolerated the procedure well. There was minimal bleeding.   The patient was instructed to ice the knee upon leaving clinic and refrain from overuse over the next 3 days.   The patient was instructed to go to the emergency room with any usual pain, swelling, or redness occurred in the injected area.   The patient was given a followup appointment to evaluate response to the injection to their increased range of motion and reduction of pain.  Follow up for euflexxa  Dr Kee Hodo    Large Joint Injection/Arthocentesis: L knee joint    Date/Time: 10/5/2022 7:07 AM  Performed by: Kee Hood MD  Authorized by: Kee Hood MD     Indications:  Pain and osteoarthritis  Needle Size:  22 G  Guidance: landmark guided    Approach:  Superolateral  Location:  Knee      Medications:  20 mg sodium hyaluronate 20 MG/2ML  Outcome:  Tolerated well, no immediate complications  Procedure discussed: discussed risks, benefits, and alternatives    Consent Given by:  Patient  Timeout: timeout called immediately prior to procedure    Prep: patient was prepped and draped in usual sterile fashion

## 2022-10-05 NOTE — LETTER
10/5/2022         RE: Arie Hahn Jr.  [address] [facility] [address]        Dear Colleague,    Thank you for referring your patient, Arie Hahn Jr., to the [facility] SPORTS MEDICINE CLINIC [place]. Please see a copy of my visit note below.    HISTORY OF PRESENT ILLNESS  Mr. Hahn is a pleasant 56 year old year old male who presents to clinic today with   Arie explains that he is here today for left knee euflexxa #2    He is also having more low back pain that radiates into leg  Had relief from previous lumbar GWYN  Wants to discuss another  Location: left knee     Quality:  achy pain    Severity: 5/10 at worst    Duration: see above  Timing: occurs intermittently  Context: occurs while exercising and lifting and using the joint  Modifying factors:  resting and non-use makes it better, movement and use makes it worse  Associated signs & symptoms: lumbar radicular pain    MEDICAL HISTORY  Patient Active Problem List   Diagnosis     Morbid obesity (H)     Left knee pain       Current Outpatient Medications   Medication Sig Dispense Refill     atenolol (TENORMIN) 50 MG tablet Take 1 tablet by mouth 2 times daily       ATENOLOL 50 MG OR TABS 1 tab twice daily       diclofenac (VOLTAREN) 1 % GEL Place onto the skin 4 times daily Apply to left knee 4 times daily as needed, wash hands after application. 1 Tube 1     diclofenac (VOLTAREN) 1 % topical gel Place 4 g onto the skin 4 times daily 1 Tube 1     digoxin (LANOXIN) 250 MCG tablet Take 1 tablet by mouth daily       DIGOXIN 0.25 MG OR TABS 1 TABLET DAILY       enalapril (VASOTEC) 5 MG tablet Take 5 mg by mouth       meloxicam (MOBIC) 15 MG tablet TAKE 1 TABLET BY MOUTH EVERY DAY 30 tablet 0     methylPREDNISolone (MEDROL) 4 MG tablet therapy pack Follow Package Directions 21 tablet 0     VASOTEC 5 MG OR TABS 1 tab twice daily       VITAMIN D, CHOLECALCIFEROL, PO Take by mouth daily         No Known Allergies    Family History   Problem  Relation Age of Onset     Diabetes Mother      Hypertension Mother      Social History     Socioeconomic History     Marital status:    Tobacco Use     Smoking status: Never Smoker     Smokeless tobacco: Never Used   Substance and Sexual Activity     Alcohol use: Yes     Comment: weekends     Drug use: No       Additional medical/Social/Surgical histories reviewed in [facility] and updated as appropriate.     REVIEW OF SYSTEMS (10/5/2022)  10 point ROS of systems including Constitutional, Eyes, Respiratory, Cardiovascular, Gastroenterology, Genitourinary, Integumentary, Musculoskeletal, Psychiatric, Allergic/Immunologic were all negative except for pertinent positives noted in my HPI.     PHYSICAL EXAM  VSS    General  - normal appearance, in no obvious distress  HEENT  - conjunctivae not injected, moist mucous membranes, normocephalic/atraumatic head, ears normal appearance, no lesions, mouth normal appearance, no scars, normal dentition and teeth present  CV  - normal peripheral perfusion  Pulm  - normal respiratory pattern, non-labored  Musculoskeletal - lumbar spine  - stance: normal gait without limp, no obvious leg length discrepancy, normal heel and toe walk  - inspection: normal bone and joint alignment, no obvious scoliosis  - palpation: no paravertebral or bony tenderness  - ROM: flexion exacerbates pain, normal extension, sidebending, rotation  - strength: lower extremities 5/5 in all planes  - special tests:  (+) straight leg raise  (+) slump test  Neuro  - patellar and Achilles DTRs 2+ bilaterally, lower extremity sensory deficit throughout L5 distribution, grossly normal coordination, normal muscle tone  Skin  - no ecchymosis, erythema, warmth, or induration, no obvious rash  Psych  - interactive, appropriate, normal mood and affect    ASSESSMENT & PLAN  55 yo male with lumbar ddd, disc herniations, left knee arthritis  I independently reviewed the following imaging studies:  Lumbar MRI: shows ddd,  disc herniations  Discussed and ordered lumbar injection  After a 20 minute discussion and examination, we decided to perform a same day injection for therapeutic purposes for left knee with euflexxa as planned    Patient has been doing home exercise physical therapy program for this problem      Appropriate PPE was utilized for prevention of spread of Covid-19.  Kee Hood MD, [facility]    PROCEDURE:  left          Knee joint injection with euflexxa    The patient was apprised of the risks and the benefits of the procedure and consented and a written consent was signed.   The patient s  KNEE was sterilely prepped with chloraprep.     The patient was injected with euflexxa syringe into the knee with a 1.5-inch 22-gauge needle at the_superiorlateral aspect of their knee joint    There were no complications. The patient tolerated the procedure well. There was minimal bleeding.   The patient was instructed to ice the knee upon leaving clinic and refrain from overuse over the next 3 days.   The patient was instructed to go to the emergency room with any usual pain, swelling, or redness occurred in the injected area.   The patient was given a followup appointment to evaluate response to the injection to their increased range of motion and reduction of pain.  Follow up for euflexxa  Dr Kee Hood    Large Joint Injection/Arthocentesis: L knee joint    Date/Time: 10/5/2022 7:07 AM  Performed by: Kee Hood MD  Authorized by: Kee Hood MD     Indications:  Pain and osteoarthritis  Needle Size:  22 G  Guidance: landmark guided    Approach:  Superolateral  Location:  Knee      Medications:  20 mg sodium hyaluronate 20 MG/2ML  Outcome:  Tolerated well, no immediate complications  Procedure discussed: discussed risks, benefits, and alternatives    Consent Given by:  Patient  Timeout: timeout called immediately prior to procedure    Prep: patient was prepped and draped in usual sterile fashion               Again, thank you for allowing me to participate in the care of your patient.        Sincerely,        Kee Hood MD

## 2022-10-06 ENCOUNTER — TELEPHONE (OUTPATIENT)
Dept: ORTHOPEDICS | Facility: CLINIC | Age: 57
End: 2022-10-06

## 2022-10-06 NOTE — TELEPHONE ENCOUNTER
Left Voicemail (1st Attempt) for the patient to call back and schedule the following:    Appointment type: return   Provider: dr. linder   Return date: 2 week after epidural injection   Specialty phone number: [phone]   Additional appointment(s) needed: epidural injection prior    Maris murrell Procedure   Orthopedics, Podiatry, Sports Medicine, Ent ,Eye , Audiology, Adult Endocrine & Diabetes, Nutrition & Medication Therapy Management Specialties   [facility]

## 2022-10-13 ENCOUNTER — OFFICE VISIT (OUTPATIENT)
Dept: ORTHOPEDICS | Facility: CLINIC | Age: 57
End: 2022-10-13
Payer: COMMERCIAL

## 2022-10-13 DIAGNOSIS — M17.12 ARTHRITIS OF LEFT KNEE: Primary | ICD-10-CM

## 2022-10-13 PROCEDURE — 20610 DRAIN/INJ JOINT/BURSA W/O US: CPT | Mod: LT | Performed by: PREVENTIVE MEDICINE

## 2022-10-13 RX ADMIN — Medication 20 MG: at 07:50

## 2022-10-13 NOTE — LETTER
10/13/2022         RE: Arie Hahn Jr.  [address] [address]        Dear Colleague,    Thank you for referring your patient, Arie Hahn Jr., to the [facility] SPORTS MEDICINE CLINIC [place]. Please see a copy of my visit note below.    HISTORY OF PRESENT ILLNESS  Mr. Hahn is a pleasant 56 year old year old male who presents to clinic today with left knee pain  Arie explains that he is here today for left knee euflexxa #3    Diagnosis: left knee arthritis  PROCEDURE:            Knee joint injection with euflexxa    The patient was apprised of the risks and the benefits of the procedure and consented and a written consent was signed.   The patient s  KNEE was sterilely prepped with chloraprep.     The patient was injected with euflexxa syringe into the knee with a 1.5-inch 22-gauge needle at the_superiorlateral aspect of their knee joint    There were no complications. The patient tolerated the procedure well. There was minimal bleeding.   The patient was instructed to ice the knee upon leaving clinic and refrain from overuse over the next 3 days.   The patient was instructed to go to the emergency room with any usual pain, swelling, or redness occurred in the injected area.   The patient was given a followup appointment to evaluate response to the injection to their increased range of motion and reduction of pain.  Follow up for euflexxa  Dr Kee Hood      Large Joint Injection/Arthocentesis: L knee joint    Date/Time: 10/13/2022 7:50 AM  Performed by: Kee Hood MD  Authorized by: Kee Hood MD     Indications:  Pain and osteoarthritis  Needle Size:  22 G  Guidance: landmark guided    Approach:  Superolateral  Location:  Knee      Medications:  20 mg sodium hyaluronate 20 MG/2ML  Outcome:  Tolerated well, no immediate complications  Procedure discussed: discussed risks, benefits, and alternatives    Consent Given by:  Patient  Timeout: timeout  called immediately prior to procedure    Prep: patient was prepped and draped in usual sterile fashion              Again, thank you for allowing me to participate in the care of your patient.        Sincerely,        Kee Hood MD

## 2022-10-13 NOTE — PROGRESS NOTES
HISTORY OF PRESENT ILLNESS  Mr. Hahn is a pleasant 56 year old year old male who presents to clinic today with left knee pain  Arie explains that he is here today for left knee euflexxa #3    Diagnosis: left knee arthritis  PROCEDURE:            Knee joint injection with euflexxa    The patient was apprised of the risks and the benefits of the procedure and consented and a written consent was signed.   The patient s  KNEE was sterilely prepped with chloraprep.     The patient was injected with euflexxa syringe into the knee with a 1.5-inch 22-gauge needle at the_superiorlateral aspect of their knee joint    There were no complications. The patient tolerated the procedure well. There was minimal bleeding.   The patient was instructed to ice the knee upon leaving clinic and refrain from overuse over the next 3 days.   The patient was instructed to go to the emergency room with any usual pain, swelling, or redness occurred in the injected area.   The patient was given a followup appointment to evaluate response to the injection to their increased range of motion and reduction of pain.  Follow up for euflexxa  Dr Kee Hood      Large Joint Injection/Arthocentesis: L knee joint    Date/Time: 10/13/2022 7:50 AM  Performed by: Kee Hood MD  Authorized by: Kee Hood MD     Indications:  Pain and osteoarthritis  Needle Size:  22 G  Guidance: landmark guided    Approach:  Superolateral  Location:  Knee      Medications:  20 mg sodium hyaluronate 20 MG/2ML  Outcome:  Tolerated well, no immediate complications  Procedure discussed: discussed risks, benefits, and alternatives    Consent Given by:  Patient  Timeout: timeout called immediately prior to procedure    Prep: patient was prepped and draped in usual sterile fashion

## 2022-10-17 RX ORDER — HYALURONATE SODIUM 10 MG/ML
20 SYRINGE (ML) INTRAARTICULAR
Status: SHIPPED | OUTPATIENT
Start: 2022-10-05

## 2022-10-23 RX ORDER — HYALURONATE SODIUM 10 MG/ML
20 SYRINGE (ML) INTRAARTICULAR
Status: SHIPPED | OUTPATIENT
Start: 2022-10-13

## 2022-10-27 ENCOUNTER — OFFICE VISIT (OUTPATIENT)
Dept: ORTHOPEDICS | Facility: CLINIC | Age: 57
End: 2022-10-27
Payer: COMMERCIAL

## 2022-10-27 DIAGNOSIS — M17.12 ARTHRITIS OF LEFT KNEE: ICD-10-CM

## 2022-10-27 DIAGNOSIS — M25.562 LEFT KNEE PAIN, UNSPECIFIED CHRONICITY: Primary | ICD-10-CM

## 2022-10-27 DIAGNOSIS — M17.12 PATELLOFEMORAL ARTHRITIS OF LEFT KNEE: ICD-10-CM

## 2022-10-27 PROCEDURE — 99207 PR DROP WITH A PROCEDURE: CPT | Performed by: PREVENTIVE MEDICINE

## 2022-10-27 PROCEDURE — 20610 DRAIN/INJ JOINT/BURSA W/O US: CPT | Mod: LT | Performed by: PREVENTIVE MEDICINE

## 2022-10-27 RX ADMIN — TRIAMCINOLONE ACETONIDE 40 MG: 40 INJECTION, SUSPENSION INTRA-ARTICULAR; INTRAMUSCULAR at 10:46

## 2022-10-27 NOTE — PROGRESS NOTES
HISTORY OF PRESENT ILLNESS  Mr. Hahn is a pleasant 56 year old year old male who presents to clinic today with   Arie explains that he works for [employer] and they are currently moving, he has had swelling and pain in his left knee this past week.    Location: left knee     Quality:  achy pain    Severity: 10/10 at worst    Duration: see above  Timing: occurs intermittently  Context: occurs while exercising and lifting and using the joint  Modifying factors:  resting and non-use makes it better, movement and use makes it worse  Associated signs & symptoms: some swelling    MEDICAL HISTORY  Patient Active Problem List   Diagnosis     Morbid obesity (H)     Left knee pain       Current Outpatient Medications   Medication Sig Dispense Refill     atenolol (TENORMIN) 50 MG tablet Take 1 tablet by mouth 2 times daily       ATENOLOL 50 MG OR TABS 1 tab twice daily       diclofenac (VOLTAREN) 1 % GEL Place onto the skin 4 times daily Apply to left knee 4 times daily as needed, wash hands after application. 1 Tube 1     diclofenac (VOLTAREN) 1 % topical gel Place 4 g onto the skin 4 times daily 1 Tube 1     digoxin (LANOXIN) 250 MCG tablet Take 1 tablet by mouth daily       DIGOXIN 0.25 MG OR TABS 1 TABLET DAILY       enalapril (VASOTEC) 5 MG tablet Take 5 mg by mouth       meloxicam (MOBIC) 15 MG tablet TAKE 1 TABLET BY MOUTH EVERY DAY 30 tablet 0     methylPREDNISolone (MEDROL) 4 MG tablet therapy pack Follow Package Directions 21 tablet 0     VASOTEC 5 MG OR TABS 1 tab twice daily       VITAMIN D, CHOLECALCIFEROL, PO Take by mouth daily         No Known Allergies    Family History   Problem Relation Age of Onset     Diabetes Mother      Hypertension Mother      Social History     Socioeconomic History     Marital status:    Tobacco Use     Smoking status: Never     Smokeless tobacco: Never   Substance and Sexual Activity     Alcohol use: Yes     Comment: weekends     Drug use: No       Additional  medical/Social/Surgical histories reviewed in [facility] and updated as appropriate.     REVIEW OF SYSTEMS (10/27/2022)  10 point ROS of systems including Constitutional, Eyes, Respiratory, Cardiovascular, Gastroenterology, Genitourinary, Integumentary, Musculoskeletal, Psychiatric, Allergic/Immunologic were all negative except for pertinent positives noted in my HPI.     PHYSICAL EXAM  VSS    General  - normal appearance, in no obvious distress  HEENT  - conjunctivae not injected, moist mucous membranes, normocephalic/atraumatic head, ears normal appearance, no lesions, mouth normal appearance, no scars, normal dentition and teeth present  CV  - normal popliteal pulse  Pulm  - normal respiratory pattern, non-labored  Musculoskeletal -left  knee  - stance: mildly antalgic gait, genu varum  - inspection: some generalized swelling, trace effusion  - palpation: medial joint line tenderness, patella and patellar tendon non-tender, normal popliteal pulse  - ROM: 100 degrees flexion, 0 degrees extension, painful active ROM  - strength: 5/5 in flexion, 5/5 in extension  - neuro: no sensory or motor deficit  - special tests:  (-) Lachman  (-) anterior drawer  (-) posterior drawer  (-) pivot shift  (-) Debra  (-) Thessaly  (-) varus at 0 and 30 degrees flexion  (-) valgus at 0 and 30 degrees flexion  (+) Bebo s compression test  (-) patellar apprehension  Neuro  - no sensory or motor deficit, grossly normal coordination, normal muscle tone  Skin  - no ecchymosis, erythema, warmth, or induration, no obvious rash  Psych  - interactive, appropriate, normal mood and affect    ASSESSMENT & PLAN  57 yo male with left knee arthritis    I independently reviewed the following imaging studies:  Left knee xray: shows arthritis  After a 20 minute discussion and examination, we decided to perform a same day injection for diagnostic and therapeutic purposes for left knee  Patient has been doing home exercise physical therapy program for  this problem      Appropriate PPE was utilized for prevention of spread of Covid-19.  Kee Hood MD, CAQSM  PROCEDURE:        Left     Knee joint injection   The patient was apprised of the risks and the benefits of the procedure and consented and a written consent was signed.   The patient s  KNEE was sterilely prepped with chloraprep.   40 mg of triamcinolone suspension was drawn up into a 5 mL syringe with 4 mL of 1% lidocaine  The patient was injected into the knee with a 1.5-inch 22-gauge needle at the_superiorlateral aspect of their knee joint  There were no complications. The patient tolerated the procedure well. There was minimal bleeding.   The patient was instructed to ice the knee upon leaving clinic and refrain from overuse over the next 3 days.   The patient was instructed to go to the emergency room with any usual pain, swelling, or redness occurred in the injected area.   The patient was given a followup appointment to evaluate response to the injection to their increased range of motion and reduction of pain.  Follow up PRN  Dr Kee Hood      Large Joint Injection/Arthocentesis: L knee joint    Date/Time: 10/27/2022 10:46 AM  Performed by: Kee Hood MD  Authorized by: Kee Hood MD     Indications:  Pain, osteoarthritis, diagnostic evaluation and joint swelling  Needle Size:  22 G  Guidance: landmark guided    Approach:  Superolateral  Location:  Knee      Medications:  40 mg triamcinolone 40 MG/ML  Outcome:  Tolerated well, no immediate complications  Procedure discussed: discussed risks, benefits, and alternatives    Consent Given by:  Patient  Timeout: timeout called immediately prior to procedure    Prep: patient was prepped and draped in usual sterile fashion

## 2022-10-27 NOTE — LETTER
10/27/2022         RE: Arie Hahn Jr.  [address] [facility] [address]        Dear Colleague,    Thank you for referring your patient, Arie Hahn Jr., to the [facility]. Please see a copy of my visit note below.    HISTORY OF PRESENT ILLNESS  Mr. Hahn is a pleasant 56 year old year old male who presents to clinic today with   Arie explains that he works for [employer] and they are currently moving, he has had swelling and pain in his left knee this past week.    Location: left knee     Quality:  achy pain    Severity: 10/10 at worst    Duration: see above  Timing: occurs intermittently  Context: occurs while exercising and lifting and using the joint  Modifying factors:  resting and non-use makes it better, movement and use makes it worse  Associated signs & symptoms: some swelling    MEDICAL HISTORY  Patient Active Problem List   Diagnosis     Morbid obesity (H)     Left knee pain       Current Outpatient Medications   Medication Sig Dispense Refill     atenolol (TENORMIN) 50 MG tablet Take 1 tablet by mouth 2 times daily       ATENOLOL 50 MG OR TABS 1 tab twice daily       diclofenac (VOLTAREN) 1 % GEL Place onto the skin 4 times daily Apply to left knee 4 times daily as needed, wash hands after application. 1 Tube 1     diclofenac (VOLTAREN) 1 % topical gel Place 4 g onto the skin 4 times daily 1 Tube 1     digoxin (LANOXIN) 250 MCG tablet Take 1 tablet by mouth daily       DIGOXIN 0.25 MG OR TABS 1 TABLET DAILY       enalapril (VASOTEC) 5 MG tablet Take 5 mg by mouth       meloxicam (MOBIC) 15 MG tablet TAKE 1 TABLET BY MOUTH EVERY DAY 30 tablet 0     methylPREDNISolone (MEDROL) 4 MG tablet therapy pack Follow Package Directions 21 tablet 0     VASOTEC 5 MG OR TABS 1 tab twice daily       VITAMIN D, CHOLECALCIFEROL, PO Take by mouth daily         No Known Allergies    Family History   Problem Relation Age of Onset     Diabetes Mother       Hypertension Mother      Social History     Socioeconomic History     Marital status:    Tobacco Use     Smoking status: Never     Smokeless tobacco: Never   Substance and Sexual Activity     Alcohol use: Yes     Comment: weekends     Drug use: No       Additional medical/Social/Surgical histories reviewed in [facility] and updated as appropriate.     REVIEW OF SYSTEMS (10/27/2022)  10 point ROS of systems including Constitutional, Eyes, Respiratory, Cardiovascular, Gastroenterology, Genitourinary, Integumentary, Musculoskeletal, Psychiatric, Allergic/Immunologic were all negative except for pertinent positives noted in my HPI.     PHYSICAL EXAM  VSS    General  - normal appearance, in no obvious distress  HEENT  - conjunctivae not injected, moist mucous membranes, normocephalic/atraumatic head, ears normal appearance, no lesions, mouth normal appearance, no scars, normal dentition and teeth present  CV  - normal popliteal pulse  Pulm  - normal respiratory pattern, non-labored  Musculoskeletal -left  knee  - stance: mildly antalgic gait, genu varum  - inspection: some generalized swelling, trace effusion  - palpation: medial joint line tenderness, patella and patellar tendon non-tender, normal popliteal pulse  - ROM: 100 degrees flexion, 0 degrees extension, painful active ROM  - strength: 5/5 in flexion, 5/5 in extension  - neuro: no sensory or motor deficit  - special tests:  (-) Lachman  (-) anterior drawer  (-) posterior drawer  (-) pivot shift  (-) Debra  (-) Thessaly  (-) varus at 0 and 30 degrees flexion  (-) valgus at 0 and 30 degrees flexion  (+) Bebo s compression test  (-) patellar apprehension  Neuro  - no sensory or motor deficit, grossly normal coordination, normal muscle tone  Skin  - no ecchymosis, erythema, warmth, or induration, no obvious rash  Psych  - interactive, appropriate, normal mood and affect    ASSESSMENT & PLAN  55 yo male with left knee arthritis    I independently reviewed the  following imaging studies:  Left knee xray: shows arthritis  After a 20 minute discussion and examination, we decided to perform a same day injection for diagnostic and therapeutic purposes for left knee  Patient has been doing home exercise physical therapy program for this problem      Appropriate PPE was utilized for prevention of spread of Covid-19.  Kee Hood MD, CAM  PROCEDURE:        Left     Knee joint injection   The patient was apprised of the risks and the benefits of the procedure and consented and a written consent was signed.   The patient s  KNEE was sterilely prepped with chloraprep.   40 mg of triamcinolone suspension was drawn up into a 5 mL syringe with 4 mL of 1% lidocaine  The patient was injected into the knee with a 1.5-inch 22-gauge needle at the_superiorlateral aspect of their knee joint  There were no complications. The patient tolerated the procedure well. There was minimal bleeding.   The patient was instructed to ice the knee upon leaving clinic and refrain from overuse over the next 3 days.   The patient was instructed to go to the emergency room with any usual pain, swelling, or redness occurred in the injected area.   The patient was given a followup appointment to evaluate response to the injection to their increased range of motion and reduction of pain.  Follow up PRN  Dr Kee Hood      Large Joint Injection/Arthocentesis: L knee joint    Date/Time: 10/27/2022 10:46 AM  Performed by: Kee Hood MD  Authorized by: Kee Hood MD     Indications:  Pain, osteoarthritis, diagnostic evaluation and joint swelling  Needle Size:  22 G  Guidance: landmark guided    Approach:  Superolateral  Location:  Knee      Medications:  40 mg triamcinolone 40 MG/ML  Outcome:  Tolerated well, no immediate complications  Procedure discussed: discussed risks, benefits, and alternatives    Consent Given by:  Patient  Timeout: timeout called immediately prior to procedure     Prep: patient was prepped and draped in usual sterile fashion              Again, thank you for allowing me to participate in the care of your patient.        Sincerely,        Kee Hood MD

## 2022-11-06 RX ORDER — TRIAMCINOLONE ACETONIDE 40 MG/ML
40 INJECTION, SUSPENSION INTRA-ARTICULAR; INTRAMUSCULAR
Status: SHIPPED | OUTPATIENT
Start: 2022-10-27

## 2022-11-29 ENCOUNTER — ANCILLARY PROCEDURE (OUTPATIENT)
Dept: GENERAL RADIOLOGY | Facility: CLINIC | Age: 57
End: 2022-11-29
Attending: PREVENTIVE MEDICINE
Payer: COMMERCIAL

## 2022-11-29 DIAGNOSIS — M51.16 LUMBAR DISC HERNIATION WITH RADICULOPATHY: ICD-10-CM

## 2022-11-29 PROCEDURE — 62323 NJX INTERLAMINAR LMBR/SAC: CPT | Performed by: RADIOLOGY

## 2022-11-29 RX ORDER — METHYLPREDNISOLONE ACETATE 80 MG/ML
80 INJECTION, SUSPENSION INTRA-ARTICULAR; INTRALESIONAL; INTRAMUSCULAR; SOFT TISSUE ONCE
Status: COMPLETED | OUTPATIENT
Start: 2022-11-29 | End: 2022-11-29

## 2022-11-29 RX ORDER — BUPIVACAINE HYDROCHLORIDE 5 MG/ML
5 INJECTION, SOLUTION EPIDURAL; INTRACAUDAL ONCE
Status: COMPLETED | OUTPATIENT
Start: 2022-11-29 | End: 2022-11-29

## 2022-11-29 RX ORDER — IOPAMIDOL 408 MG/ML
10 INJECTION, SOLUTION INTRATHECAL ONCE
Status: COMPLETED | OUTPATIENT
Start: 2022-11-29 | End: 2022-11-29

## 2022-11-29 RX ADMIN — IOPAMIDOL 2 ML: 408 INJECTION, SOLUTION INTRATHECAL at 09:03

## 2022-11-29 RX ADMIN — METHYLPREDNISOLONE ACETATE 80 MG: 80 INJECTION, SUSPENSION INTRA-ARTICULAR; INTRALESIONAL; INTRAMUSCULAR; SOFT TISSUE at 09:03

## 2022-11-29 RX ADMIN — BUPIVACAINE HYDROCHLORIDE 5 MG: 5 INJECTION, SOLUTION EPIDURAL; INTRACAUDAL at 09:03

## 2022-12-19 NOTE — PROGRESS NOTES
"    [facility]  Orthopaedic Surgery Consultation by Maury Lopez M.D.    Arie Hahn Jr. MRN# [id number]   Age: 56 year old YOB: 1965     Requesting physician: No ref. provider found  No Ref-Primary, Physician     Background history:  DX:  1. Morbid obesity  2. Atrial fibrillation for which no anticoagulation  3. ACL tear right knee    TREATMENTS:  1. 11/17/2008, ACL reconstruction right with allograft, Dr. Zapien          History of Present Illness:   56 year old male who was referred to our clinic by Dr. Hood because of chronic left knee pain.  This pain has been present for multiple years.  Potentially started after a fall from ladder.  Pain has been progressive since.  It is felt on the medial knee mostly but radiates throughout.  Today the pain radiates down his shin.  Patient denies the presence of significant lower back or hip/groin pain.  No motor or sensory deficits of left lower extremity.  Patient is experiencing night pain and initiation stiffness and soreness.  He reports effusion and grinding and clicking of the knee.  No clear instability.  To mitigate the pain he uses over-the-counter analgesics.  He has attempted to reduce his weight by himself, he has had multiple injections with cortisone and more recently hyaluronic acid.  He is here to discuss what a total knee replacement surgery would entail.    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  Living situation: Lives with wife. 2 story home.  Hobbies / Sports: Used to  softball. Fish and hunt. ATV driving. Going to the cabin.    Smoking: No  Alcohol: Yes  Illicit drug use: No         Physical Exam:     EXAMINATION pertinent findings:   PSYCH: Pleasant, healthy-appearing, alert, oriented x3, cooperative. Normal mood and affect.  VITAL SIGNS: Height 1.803 m (5' 11\"), weight 145.2 kg (320 lb).  Reviewed nursing intake notes.   There is no height or weight on file to calculate " BMI.  RESP: non labored breathing   ABD: benign, soft, non-tender, no acute peritoneal findings  SKIN: grossly normal   LYMPHATIC: grossly normal, no adenopathy, no extremity edema  NEURO: grossly normal , no motor deficits  VASCULAR: satisfactory perfusion of all extremities   MUSCULOSKELETAL:   Alignment: Neutral alignment of left lower extremity.  Gait: Slight antalgic gait over left lower extremity.  Appears to be unable to fully extend the knee.    Left hip exhibits a full range of motion.  No pain upon rotations.  Lasegue's test is negative.  No tenderness to palpation of the greater trochanteric region.    L knee: -0-0  .  Deep flexion is recognizably painful.  Straight leg raise +. No redness, warmth or skin changes present. Effusion No. Ligamentously stable in both ML and AP direction. Normal PF tracking without crepitus. Rabot +. Meniscal provocation tests are negative.  There is some tenderness to palpation over the joint line.     Left LE:   Thigh and leg compartments soft and compressible   +Quad/TA/GSC/FHL/EHL   SILT DP/SP/Michael/Saph/Tib nerve distributions   Palpable dorsalis pedis pulse          Data:   All laboratory data reviewed  All imaging studies reviewed by me personally.    XR knee left 3/9/2022 and 10/11/2021:  My interpretation: Severe tricompartmental osteoarthritic changes most notably in medial and patellofemoral compartments.  Presence of significant joint space narrowing, marginal osteophytes, sclerosis and subchondral cysts.    XR alignment films 3/9/2022:  My interpretation: Neutral alignment of bilateral lower extremities.         Assessment and Plan:   Assessment:  56-year-old male presenting with chronic left knee pain due to severe osteoarthritic changes.     Plan:  I had a long discussion with the patient regarding ongoing management options.  Reviewed surgical and nonsurgical treatments.  The non-surgical options include activity modification, weight reduction, pain  medication, PT, bracing and injection therapy.  We discussed that we could await the final result of the hyaluronic acid.  Given patient's wear pattern  bracing does not seem to be beneficial for him.        As for surgery we discussed the option of total knee replacement surgery. We reviewed total knee replacement in detail including the procedure, the implants, the recovery process, and long-term outcomes.  We reviewed that the risks of the surgery include but are not limited to infection, wound problems, stiffness, persistent pain, swelling, clicking, loosening, revision surgery.  We also reviewed less common risks such as neurovascular injury fracture, and other implant-related issues.  We reviewed other medical complications such as a blood clot.  We discussed that the vast majority of cases have a highly successful outcome.  However there is a small subset of patients that do experience complications or problems following the knee replacement and these problems can be very debilitating and painful and sometimes do not improve.       Based on our discussion patient articulates that he would like to attempt continued nonoperative treatment measures.  He will continue to work on his weight reduction and a referral to the comprehensive weight loss clinic was provided.  He will continue to use over-the-counter pain medication and will await the full effect of the hyaluronic acid injections.  Depending on the time interval he will either follow-up with Dr. Hood for repeat injection or myself for potential knee replacement surgery.  All questions were answered and patient understands and agrees to the treatment plan as set forth.       More information on joint replacements can be found on : [email or web address]    Thank you for your referral.    Maury Lopez MD, PhD     Adult Reconstruction  [facility] Department of  Orthopaedic Surgery  Pager [phone]      DATA for DOCUMENTATION:         Past Medical History:     Patient Active Problem List   Diagnosis     Morbid obesity (H)     Left knee pain     Past Medical History:   Diagnosis Date     Atrial fibrillation (H)     lone     Torn meniscus     left knee       Also see scanned health assessment forms.       Past Surgical History:     Past Surgical History:   Procedure Laterality Date     ARTHROSCOPY KNEE WITH LATERAL MENISCECTOMY Left 10/22/2014    Procedure: ARTHROSCOPY KNEE WITH LATERAL MENISCECTOMY;  Surgeon: Alok Zapien MD;  Location: [facility] OR     C STOMACH SURGERY PROCEDURE UNLISTED      appe 16 yrs old     C TOTAL KNEE ARTHROPLASTY      reconstuction            Social History:     Social History     Socioeconomic History     Marital status:      Spouse name: Not on file     Number of children: Not on file     Years of education: Not on file     Highest education level: Not on file   Occupational History     Not on file   Tobacco Use     Smoking status: Never Smoker     Smokeless tobacco: Never Used   Substance and Sexual Activity     Alcohol use: Yes     Comment: weekends     Drug use: No     Sexual activity: Not on file   Other Topics Concern     Not on file   Social History Narrative     Not on file     Social Determinants of Health     Financial Resource Strain: Not on file   Food Insecurity: Not on file   Transportation Needs: Not on file   Physical Activity: Not on file   Stress: Not on file   Social Connections: Not on file   Intimate Partner Violence: Not on file   Housing Stability: Not on file            Family History:       Family History   Problem Relation Age of Onset     Diabetes Mother      Hypertension Mother             Medications:     Current Outpatient Medications   Medication Sig     ATENOLOL 50 MG OR TABS 1 tab twice daily     diclofenac (VOLTAREN) 1 % GEL Place onto the skin 4 times daily Apply to left knee 4 times daily as  needed, wash hands after application.     diclofenac (VOLTAREN) 1 % topical gel Place 4 g onto the skin 4 times daily     DIGOXIN 0.25 MG OR TABS 1 TABLET DAILY     meloxicam (MOBIC) 15 MG tablet TAKE 1 TABLET BY MOUTH EVERY DAY     methylPREDNISolone (MEDROL) 4 MG tablet therapy pack Follow Package Directions     VASOTEC 5 MG OR TABS 1 tab twice daily     VITAMIN D, CHOLECALCIFEROL, PO Take by mouth daily     Current Facility-Administered Medications   Medication     methylPREDNISolone (DEPO-MEDROL) injection 40 mg     sodium hyaluronate (EUFLEXXA) injection 20 mg     sodium hyaluronate (EUFLEXXA) injection 20 mg     sodium hyaluronate (EUFLEXXA) injection 20 mg     sodium hyaluronate (EUFLEXXA) injection 20 mg     triamcinolone (KENALOG-40) injection 40 mg     triamcinolone (KENALOG-40) injection 40 mg              Review of Systems:   A comprehensive 10 point review of systems (constitutional, ENT, cardiac, peripheral vascular, lymphatic, respiratory, GI, , Musculoskeletal, skin, Neurological) was performed and found to be negative except as described in this note.     See intake form completed by patient       Dutasteride Counseling: Dustasteride Counseling:  I discussed with the patient the risks of use of dutasteride including but not limited to decreased libido, decreased ejaculate volume, and gynecomastia. Women who can become pregnant should not handle medication.  All of the patient's questions and concerns were addressed. Dutasteride Male Counseling: Dustasteride Counseling:  I discussed with the patient the risks of use of dutasteride including but not limited to decreased libido, decreased ejaculate volume, and gynecomastia. Women who can become pregnant should not handle medication.  All of the patient's questions and concerns were addressed.

## 2023-04-21 ENCOUNTER — OFFICE VISIT (OUTPATIENT)
Dept: PEDIATRICS | Facility: OTHER | Age: 58
End: 2023-04-21
Attending: INTERNAL MEDICINE
Payer: COMMERCIAL

## 2023-04-21 VITALS
OXYGEN SATURATION: 96 % | DIASTOLIC BLOOD PRESSURE: 77 MMHG | TEMPERATURE: 97.6 F | HEART RATE: 54 BPM | HEIGHT: 70 IN | WEIGHT: 315 LBS | BODY MASS INDEX: 45.1 KG/M2 | RESPIRATION RATE: 16 BRPM | SYSTOLIC BLOOD PRESSURE: 105 MMHG

## 2023-04-21 DIAGNOSIS — E66.01 MORBID OBESITY DUE TO EXCESS CALORIES (H): ICD-10-CM

## 2023-04-21 DIAGNOSIS — I10 ESSENTIAL HYPERTENSION, BENIGN: ICD-10-CM

## 2023-04-21 DIAGNOSIS — M17.0 PRIMARY OSTEOARTHRITIS OF BOTH KNEES: ICD-10-CM

## 2023-04-21 DIAGNOSIS — E78.2 MIXED HYPERLIPIDEMIA: ICD-10-CM

## 2023-04-21 DIAGNOSIS — I48.20 CHRONIC ATRIAL FIBRILLATION (H): ICD-10-CM

## 2023-04-21 DIAGNOSIS — Z12.5 SCREENING FOR PROSTATE CANCER: ICD-10-CM

## 2023-04-21 DIAGNOSIS — Z13.220 LIPID SCREENING: ICD-10-CM

## 2023-04-21 DIAGNOSIS — Z79.899 ENCOUNTER FOR MONITORING DIGOXIN THERAPY: ICD-10-CM

## 2023-04-21 DIAGNOSIS — R73.09 ELEVATED GLUCOSE: ICD-10-CM

## 2023-04-21 DIAGNOSIS — Z13.29 SCREENING FOR THYROID DISORDER: ICD-10-CM

## 2023-04-21 DIAGNOSIS — Z51.81 ENCOUNTER FOR MONITORING DIGOXIN THERAPY: ICD-10-CM

## 2023-04-21 DIAGNOSIS — G47.33 OSA (OBSTRUCTIVE SLEEP APNEA): ICD-10-CM

## 2023-04-21 DIAGNOSIS — Z76.89 ENCOUNTER TO ESTABLISH CARE: Primary | ICD-10-CM

## 2023-04-21 DIAGNOSIS — Z23 NEED FOR VACCINATION: ICD-10-CM

## 2023-04-21 DIAGNOSIS — E55.9 VITAMIN D INSUFFICIENCY: ICD-10-CM

## 2023-04-21 PROBLEM — M25.562 LEFT KNEE PAIN: Status: RESOLVED | Noted: 2020-11-03 | Resolved: 2023-04-21

## 2023-04-21 LAB
ALBUMIN SERPL BCG-MCNC: 4.2 G/DL (ref 3.5–5.2)
ALP SERPL-CCNC: 84 U/L (ref 40–129)
ALT SERPL W P-5'-P-CCNC: 29 U/L (ref 10–50)
ANION GAP SERPL CALCULATED.3IONS-SCNC: 9 MMOL/L (ref 7–15)
AST SERPL W P-5'-P-CCNC: 29 U/L (ref 10–50)
BILIRUB SERPL-MCNC: 1.5 MG/DL
BUN SERPL-MCNC: 14.1 MG/DL (ref 8–23)
CALCIUM SERPL-MCNC: 9.5 MG/DL (ref 8.6–10)
CHLORIDE SERPL-SCNC: 98 MMOL/L (ref 98–107)
CHOLEST SERPL-MCNC: 215 MG/DL
CREAT SERPL-MCNC: 1.1 MG/DL (ref 0.67–1.17)
DEPRECATED HCO3 PLAS-SCNC: 26 MMOL/L (ref 22–29)
DIGOXIN SERPL-MCNC: 0.7 NG/ML (ref 0.6–2)
ERYTHROCYTE [DISTWIDTH] IN BLOOD BY AUTOMATED COUNT: 12.3 % (ref 10–15)
GFR SERPL CREATININE-BSD FRML MDRD: 77 ML/MIN/1.73M2
GLUCOSE SERPL-MCNC: 94 MG/DL (ref 70–99)
HBA1C MFR BLD: 5.5 % (ref 4–6.2)
HCT VFR BLD AUTO: 47.6 % (ref 40–53)
HDLC SERPL-MCNC: 45 MG/DL
HGB BLD-MCNC: 16.9 G/DL (ref 13.3–17.7)
LDLC SERPL CALC-MCNC: 138 MG/DL
MCH RBC QN AUTO: 32.3 PG (ref 26.5–33)
MCHC RBC AUTO-ENTMCNC: 35.5 G/DL (ref 31.5–36.5)
MCV RBC AUTO: 91 FL (ref 78–100)
NONHDLC SERPL-MCNC: 170 MG/DL
PLATELET # BLD AUTO: 189 10E3/UL (ref 150–450)
POTASSIUM SERPL-SCNC: 4.4 MMOL/L (ref 3.4–5.3)
PROT SERPL-MCNC: 7.2 G/DL (ref 6.4–8.3)
PSA SERPL DL<=0.01 NG/ML-MCNC: 1.6 NG/ML (ref 0–3.5)
RBC # BLD AUTO: 5.23 10E6/UL (ref 4.4–5.9)
SODIUM SERPL-SCNC: 133 MMOL/L (ref 136–145)
TRIGL SERPL-MCNC: 161 MG/DL
TSH SERPL DL<=0.005 MIU/L-ACNC: 1.72 UIU/ML (ref 0.3–4.2)
WBC # BLD AUTO: 7.3 10E3/UL (ref 4–11)

## 2023-04-21 PROCEDURE — 85027 COMPLETE CBC AUTOMATED: CPT | Mod: ZL | Performed by: INTERNAL MEDICINE

## 2023-04-21 PROCEDURE — 80061 LIPID PANEL: CPT | Mod: ZL | Performed by: INTERNAL MEDICINE

## 2023-04-21 PROCEDURE — 99204 OFFICE O/P NEW MOD 45 MIN: CPT | Performed by: INTERNAL MEDICINE

## 2023-04-21 PROCEDURE — 82306 VITAMIN D 25 HYDROXY: CPT | Mod: ZL | Performed by: INTERNAL MEDICINE

## 2023-04-21 PROCEDURE — 36415 COLL VENOUS BLD VENIPUNCTURE: CPT | Mod: ZL | Performed by: INTERNAL MEDICINE

## 2023-04-21 PROCEDURE — G0103 PSA SCREENING: HCPCS | Mod: ZL | Performed by: INTERNAL MEDICINE

## 2023-04-21 PROCEDURE — 80053 COMPREHEN METABOLIC PANEL: CPT | Mod: ZL | Performed by: INTERNAL MEDICINE

## 2023-04-21 PROCEDURE — 83036 HEMOGLOBIN GLYCOSYLATED A1C: CPT | Mod: ZL | Performed by: INTERNAL MEDICINE

## 2023-04-21 PROCEDURE — 84443 ASSAY THYROID STIM HORMONE: CPT | Mod: ZL | Performed by: INTERNAL MEDICINE

## 2023-04-21 PROCEDURE — 80162 ASSAY OF DIGOXIN TOTAL: CPT | Mod: ZL | Performed by: INTERNAL MEDICINE

## 2023-04-21 RX ORDER — DIGOXIN 250 MCG
250 TABLET ORAL DAILY
Qty: 90 TABLET | Refills: 4 | Status: SHIPPED | OUTPATIENT
Start: 2023-04-21 | End: 2024-05-24

## 2023-04-21 RX ORDER — ENALAPRIL MALEATE 5 MG/1
5 TABLET ORAL 2 TIMES DAILY
Qty: 180 TABLET | Refills: 3 | Status: SHIPPED | OUTPATIENT
Start: 2023-04-21 | End: 2024-04-10

## 2023-04-21 RX ORDER — ZINC GLUCONATE 50 MG
50 TABLET ORAL DAILY
COMMUNITY

## 2023-04-21 RX ORDER — ATENOLOL 50 MG/1
50 TABLET ORAL 2 TIMES DAILY
Qty: 180 TABLET | Refills: 3 | Status: SHIPPED | OUTPATIENT
Start: 2023-04-21 | End: 2024-04-10

## 2023-04-21 ASSESSMENT — PAIN SCALES - GENERAL: PAINLEVEL: EXTREME PAIN (8)

## 2023-04-21 NOTE — PROGRESS NOTES
Assessment & Plan   1. Encounter to establish care    2. Chronic atrial fibrillation (H)  We discussed rate and rhythm control.  Currently CHADS2-VASc score is 1 for hypertension so aspirin is the indicated method for stroke risk reduction.  - atenolol (TENORMIN) 50 MG tablet; Take 1 tablet (50 mg) by mouth 2 times daily  Dispense: 180 tablet; Refill: 3  - digoxin (LANOXIN) 250 MCG tablet; Take 1 tablet (250 mcg) by mouth daily  Dispense: 90 tablet; Refill: 4  - Digoxin; Future  - Digoxin    3. Morbid obesity due to excess calories (H)  I recommend weight loss  - POLYSOMNOGRAPHY W/CPAP; Future  - Adult Sleep Eval & Management  Referral; Future    4. Essential hypertension, benign  At goal, no change.  - enalapril (VASOTEC) 5 MG tablet; Take 1 tablet (5 mg) by mouth 2 times daily  Dispense: 180 tablet; Refill: 3  - atenolol (TENORMIN) 50 MG tablet; Take 1 tablet (50 mg) by mouth 2 times daily  Dispense: 180 tablet; Refill: 3  - POLYSOMNOGRAPHY W/CPAP; Future  - Adult Sleep Eval & Management  Referral; Future    5. AVNI (obstructive sleep apnea) - mild, not on CPAP  Given his symptoms have worsened his sleep apnea may have progressed and recommend return to sleep study and sleep medicine  - POLYSOMNOGRAPHY W/CPAP; Future  - Adult Sleep Eval & Management  Referral; Future    6. Primary osteoarthritis of both knees  Recommend orthopedics consultation  - Orthopedic  Referral; Future    7. Elevated glucose  - Hemoglobin A1c; Future  - Comprehensive metabolic panel; Future  - Comprehensive metabolic panel  - Hemoglobin A1c    8. Encounter for monitoring digoxin therapy  - Comprehensive metabolic panel; Future  - CBC with platelets; Future  - CBC with platelets  - Comprehensive metabolic panel    9. Lipid screening  - Lipid Profile; Future  - Lipid Profile    10. Screening for thyroid disorder  - TSH with free T4 reflex; Future  - TSH with free T4 reflex    11. Vitamin D insufficiency  -  Vitamin D Deficiency; Future  - Vitamin D Deficiency    12. Screening for prostate cancer  - Prostate Specific Antigen Screen; Future  - Prostate Specific Antigen Screen    13. Need for vaccination      Patient Instructions    -- Get the new shingles vaccine series from a nurse-only visit, pharmacy or [facility] ([facility] RN).   -- You should get the vaccines: covid bivalent, shingles    Check your Blood Pressure   -- Sit in a chair, feet flat on the floor for 5 minutes   -- Avoid caffeine, exercise and smoking for 30 minutes before checking   -- Have your arm at the level of your heart   -- Make sure you have the correct size cuff   -- Write them down, bring your log book in to your appointment     -- Goal blood pressure 120/70   -- Learn about DASH Diet for dietary ways to reduce blood pressure  1. Google: [employer] DASH diet  2. [facility] site: https://www.nhlbi.nih.gov/health-topics/dash-eating-plan  3. PDF from [facility]: https://www.nhlbi.nih.gov/files/docs/public/heart/new_dash.pdf   -- Work on 5% weight loss   -- Daily physical exercise (eg. 30 min brisk walk)       Your BMI is Body mass index is 48.35 kg/m .  (BMI ranges: Normal 18.5 - 25, Overweight 25 - 30, Obesity greater than 30,     Morbid Obesity greater than 40 or greater than 35 with associated conditions.)    Facts about losing weight:   -- Overweight and Obesity increase your risk for developing diabetes, high blood pressure and stroke, and shorten your life.   -- 90% of weight loss comes from dietary changes, only 10% from exercise    What should I do?   -- Work on 5-10% weight loss   -- Focus on a few healthy dietary changes   -- Eat more fresh fruits and vegetables, and fewer carbohydrates   -- Cut out all calorie-containing beverages (milk, juice, alcohol, etc)   -- Exercise every day   -- Weigh yourself once a week   -- Learn about DASH Diet for dietary ways to reduce blood pressure  1. Google: [employer] DASH diet  2. [facility] site:  "https://www.nhlbi.nih.gov/health-topics/dash-eating-plan  3. PDF from [facility]: https://www.nhlbi.nih.gov/files/docs/public/heart/new_dash.pdf   -- Consider Weight Watchers (http://www.weightwatchers.com)   -- Consider My Fitness Pal (iOS, Android, http://www.TopCat Researchpal.DietBetter)            Return in about 1 year (around 4/21/2024), or if symptoms worsen or fail to improve, for med management.    Signed, Howard Champagne MD, FAAP, FACP  Internal Medicine & Pediatrics    Subjective   Arei Hahn Jr. is a 57 year old male who presents for [facility] primary care.  Previous physician was in the [place].  He has had atrial fibrillation since the age of 27.  He has undergone extensive evaluations through cardiology over the years and no underlying etiology was discovered.  He previously was diagnosed with mild obstructive sleep apnea and had decided against CPAP.  However now he feels like his sleeping is worse.  He has arthritis in both of his knees which is getting worse.  He thinks it is time for a replacement.    Objective   Vitals: /77   Pulse 54   Temp 97.6  F (36.4  C) (Tympanic)   Resp 16   Ht 1.778 m (5' 10\")   Wt (!) 152.9 kg (337 lb)   SpO2 96%   BMI 48.35 kg/m      General: well appearing  Neck: No JVD, no bruits  CV: Irregularly irregular, no murmur, rub or gallop  Pulm: Clear to auscultation bilaterally, no wheezing, rales or rhonchi  Neuro: Grossly intact  Musculoskeletal: No lower extremity edema  Skin: No rash  Psychiatry: Normal affect and insight.    Review and Analysis of Data   I personally reviewed the following:  External notes: Yes Outside notes reviewed from [facility]  Results: Yes Available lab reviewed via care everywhere  Use of an independent historian: No  Independent review of a test performed by another physician: No  Discussion of management with another physician: No  Moderate risk of morbidity from additional diagnostic testing and/or treatment.    "

## 2023-04-21 NOTE — PATIENT INSTRUCTIONS
-- Get the new shingles vaccine series from a nurse-only visit, pharmacy or [facility] ([facility] RN).   -- You should get the vaccines: covid bivalent, shingles    Check your Blood Pressure   -- Sit in a chair, feet flat on the floor for 5 minutes   -- Avoid caffeine, exercise and smoking for 30 minutes before checking   -- Have your arm at the level of your heart   -- Make sure you have the correct size cuff   -- Write them down, bring your log book in to your appointment     -- Goal blood pressure 120/70   -- Learn about DASH Diet for dietary ways to reduce blood pressure  Google: [facility] DASH diet  [facility] site: https://www.nhlbi.nih.gov/health-topics/dash-eating-plan  PDF from [facility]: https://www.nhlbi.nih.gov/files/docs/public/heart/new_dash.pdf   -- Work on 5% weight loss   -- Daily physical exercise (eg. 30 min brisk walk)       Your BMI is Body mass index is 48.35 kg/m .  (BMI ranges: Normal 18.5 - 25, Overweight 25 - 30, Obesity greater than 30,     Morbid Obesity greater than 40 or greater than 35 with associated conditions.)    Facts about losing weight:   -- Overweight and Obesity increase your risk for developing diabetes, high blood pressure and stroke, and shorten your life.   -- 90% of weight loss comes from dietary changes, only 10% from exercise    What should I do?   -- Work on 5-10% weight loss   -- Focus on a few healthy dietary changes   -- Eat more fresh fruits and vegetables, and fewer carbohydrates   -- Cut out all calorie-containing beverages (milk, juice, alcohol, etc)   -- Exercise every day   -- Weigh yourself once a week   -- Learn about DASH Diet for dietary ways to reduce blood pressure  Google: [facility] DASH diet  [facility] site: https://www.nhlbi.nih.gov/health-topics/dash-eating-plan  PDF from [facility]: https://www.nhlbi.nih.gov/files/docs/public/heart/new_dash.pdf   -- Consider Weight Watchers (http://www.weightwatchLotLinx.MyPerfectGift.com)   -- Consider My Fitness Pal (iOS, Android,  http://www.[email or web address] Iz oknapal.com)

## 2023-04-21 NOTE — LETTER
April 24, 2023      Arie Hahn Jr.  [address] [address]        Dear ,    We are writing to inform you of your test results.    Prostate normal.  Vit D normal.  Thyroid normal.  Kidney and liver normal.  Blood count normal.  Diabetes test good.    Your 10-year risk score is > 7.5%, and I recommend a statin.  I'll send generic Lipitor in for you.    Signed, Howard Champagne MD, FAAP, FACP  Internal Medicine & Pediatrics    The 10-year ASCVD risk score (Cathryn QUIROZ, et al., 2019) is: 7.6%    Values used to calculate the score:      Age: 57 years      Sex: Male      Is Non- : No      Diabetic: No      Tobacco smoker: No      Systolic Blood Pressure: 105 mmHg      Is BP treated: Yes      HDL Cholesterol: 45 mg/dL      Total Cholesterol: 215 mg/dL      Resulted Orders   Prostate Specific Antigen Screen   Result Value Ref Range    Prostate Specific Antigen Screen 1.60 0.00 - 3.50 ng/mL    Narrative    This result is obtained using the Roche Elecsys total PSA method on the enedelia e601 immunoassay analyzer. Results obtained with different assay methods or kits cannot be used interchangeably.   Vitamin D Deficiency   Result Value Ref Range    Vitamin D, Total (25-Hydroxy) 35 20 - 75 ug/L    Narrative    Season, race, dietary intake, and treatment affect the concentration of 25-hydroxy-Vitamin D. Values may decrease during winter months and increase during summer months. Values 20-29 ug/L may indicate Vitamin D insufficiency and values <20 ug/L may indicate Vitamin D deficiency.    Vitamin D determination is routinely performed by an immunoassay specific for 25 hydroxyvitamin D3.  If an individual is on vitamin D2(ergocalciferol) supplementation, please specify 25 OH vitamin D2 and D3 level determination by LCMSMS test VITD23.     TSH with free T4 reflex   Result Value Ref Range    TSH 1.72 0.30 - 4.20 uIU/mL   Lipid Profile   Result Value Ref Range    Cholesterol 215 (H) <200  mg/dL    Triglycerides 161 (H) <150 mg/dL    Direct Measure HDL 45 >=40 mg/dL    LDL Cholesterol Calculated 138 (H) <=100 mg/dL    Non HDL Cholesterol 170 (H) <130 mg/dL    Narrative    Cholesterol  Desirable:  <200 mg/dL    Triglycerides  Normal:  Less than 150 mg/dL  Borderline High:  150-199 mg/dL  High:  200-499 mg/dL  Very High:  Greater than or equal to 500 mg/dL    Direct Measure HDL  Female:  Greater than or equal to 50 mg/dL   Male:  Greater than or equal to 40 mg/dL    LDL Cholesterol  Desirable:  <100mg/dL  Above Desirable:  100-129 mg/dL   Borderline High:  130-159 mg/dL   High:  160-189 mg/dL   Very High:  >= 190 mg/dL    Non HDL Cholesterol  Desirable:  130 mg/dL  Above Desirable:  130-159 mg/dL  Borderline High:  160-189 mg/dL  High:  190-219 mg/dL  Very High:  Greater than or equal to 220 mg/dL   CBC with platelets   Result Value Ref Range    WBC Count 7.3 4.0 - 11.0 10e3/uL    RBC Count 5.23 4.40 - 5.90 10e6/uL    Hemoglobin 16.9 13.3 - 17.7 g/dL    Hematocrit 47.6 40.0 - 53.0 %    MCV 91 78 - 100 fL    MCH 32.3 26.5 - 33.0 pg    MCHC 35.5 31.5 - 36.5 g/dL    RDW 12.3 10.0 - 15.0 %    Platelet Count 189 150 - 450 10e3/uL   Comprehensive metabolic panel   Result Value Ref Range    Sodium 133 (L) 136 - 145 mmol/L    Potassium 4.4 3.4 - 5.3 mmol/L    Chloride 98 98 - 107 mmol/L    Carbon Dioxide (CO2) 26 22 - 29 mmol/L    Anion Gap 9 7 - 15 mmol/L    Urea Nitrogen 14.1 8.0 - 23.0 mg/dL    Creatinine 1.10 0.67 - 1.17 mg/dL    Calcium 9.5 8.6 - 10.0 mg/dL    Glucose 94 70 - 99 mg/dL    Alkaline Phosphatase 84 40 - 129 U/L    AST 29 10 - 50 U/L    ALT 29 10 - 50 U/L    Protein Total 7.2 6.4 - 8.3 g/dL    Albumin 4.2 3.5 - 5.2 g/dL    Bilirubin Total 1.5 (H) <=1.2 mg/dL    GFR Estimate 77 >60 mL/min/1.73m2      Comment:      eGFR calculated using 2021 CKD-EPI equation.   Hemoglobin A1c   Result Value Ref Range    Hemoglobin A1C 5.5 4.0 - 6.2 %   Digoxin   Result Value Ref Range    Digoxin 0.7 0.6 - 2.0  ng/mL      Comment:      Therapeutic Range:  Adults: 0.6-1.2 ng/mL    The reference range for infants (less than 3 mo) is thought to be 3.0-4.0 ng/mL; infants tolerate more digoxin because they have more binding sites and an increased metabolic rate.       If you have any questions or concerns, please call the clinic at the number listed above.       Sincerely,      Howard Champagne MD

## 2023-04-21 NOTE — NURSING NOTE
"Chief Complaint   Patient presents with     Establish Care     Recheck Medication     Referral     Orthopedic- needs knee replaced ,and cardiology for A-Fib         Initial BP (!) 150/90   Pulse 60   Temp 97.6  F (36.4  C) (Tympanic)   Resp 16   Ht 1.778 m (5' 10\")   Wt (!) 152.9 kg (337 lb)   SpO2 96%   BMI 48.35 kg/m   Estimated body mass index is 48.35 kg/m  as calculated from the following:    Height as of this encounter: 1.778 m (5' 10\").    Weight as of this encounter: 152.9 kg (337 lb).         Norma J. Gosselin, DOUGIE   "

## 2023-04-22 LAB — DEPRECATED CALCIDIOL+CALCIFEROL SERPL-MC: 35 UG/L (ref 20–75)

## 2023-04-24 RX ORDER — ATORVASTATIN CALCIUM 10 MG/1
10 TABLET, FILM COATED ORAL DAILY
Qty: 90 TABLET | Refills: 3 | Status: SHIPPED | OUTPATIENT
Start: 2023-04-24 | End: 2023-07-13

## 2023-05-23 ENCOUNTER — DOCUMENTATION ONLY (OUTPATIENT)
Dept: SLEEP MEDICINE | Facility: HOSPITAL | Age: 58
End: 2023-05-23

## 2023-05-23 NOTE — PROGRESS NOTES
SLEEP HISTORY QUESTIONNAIRE    Please describe the main reason for your sleep appointment? To see if I have sleep apnea    How long has this been a problem? unsure    Have you been diagnosed with a sleep problem in the past? NO    If so, what? borderline    What treatment was recommended? none    Have you had a sleep study in the past? YES    If yes, where and when? [facility], 6 years ago    Sleep Habits:   Do you read in bed? No  Do you eat in bed? No  Do you watch TV in bed? No  Do you work in bed? No  Do you use a phone or computer in bed? Yes    Is you sleep disturbed by:   Bed partner: No  Children: No  Noise: No   Pets: No  Other:       On two or more nights per week, do you drink alcohol to help you fall asleep?YES    On two or more nights per week, do you take melatonin to help you fall asleep? NO    On two or more nights per week, do you take over the counter medicine to fall asleep?  NO    Do you take drinks with caffeine (coffee, tea, soda, energy drinks)? NO    Do you have 3 or more caffeine drinks in a day? NO    Do you have caffeine drinks within 6 hours of bedtime? YES    Do you smoke or use tobacco? NO    Do you exercise? YES    Sleep Routine:   Using a 24 Hour Clock    What time do you usually get into bed on workdays? 930pm    Weekend/non work days? 10-11pm    What time do you get out of bed on workdays? 5am      Weekend/non work days?6am    Do you work the evening or night shift or do your shifts rotate? NO    How long does it usually take to fall to sleep? 5-10 min    How many times do you wake during the night? 4-5    How much time do you feel that you are awake during the entire night? 20 min    How long does it take for you to fall back to sleep after you wake up? 5-10 min    Why do you think you wake up? Knee, it need to be replaced    What do you do when you wake up? Roll over    How much sleep do you think you get on work nights? 5-7    How much sleep do you think you get on  weekends/non work days? 6-8    How much sleep do you think you need to feel your best? 7    How many days during a week do you take a nap on average? 4    What is the average length of your naps? 30-60 min    Do you feel better after taking a nap? YES    If you could chose the best sleep schedule for you, what time would you go to bed? 930pm  What time would you get up? 6am    Do you read in bed? NO    Do you eat in bed? NO    Do you watch TV in bed? NO    Do you do work in bed? NO    Do you use a computer or phone in bed? YES    Sleep Disruptions?   Leg movements:  Do you ever have restless, crawling, aching or other unusual feelings in your legs? YES    Do you ever wake yourself by kicking your legs during the night? NO    Are the sheets and blankets messed up or tossed about when you get up? NO    Night-time behaviors:   Do you have nightmares or night terrors? NO   How often?     Have you had times when you were sleep walking? NO    Have you been seen doing anything unusual while you sleep at nights? NO  What?   How often?     Have you ever hurt yourself or someone else while you were sleeping? NO  Please describe:     Do you clench or grind your teeth during the night? no    Sleep Apnea (pauses in breathing during sleep):  Do you wake with a headache in the morning? NO  How often?     Does your bed partner, family or friends ever say that you snore? YES  How many nights per week do you snore? Loud, frequent  Can snoring be heard outside the bedroom? yes    Do you ever wake yourself up from snoring, gasping or choking? NO    Have you ever been told that you stop breathing or have pauses in your breathing? YES    Do you wake in the morning with a dry throat or mouth? YES    Do you have trouble breathing through your nose? YES    Do you have problems with heartburn, reflux or a hiatal hernia? NO    Which positions do you usually sleep in? (stomach, back, sides, all) all    Do you use oxygen or any other medical  equipment when you sleep? NO    Do members of your family (related by blood) snore? YES    Have any members of your family been diagnosed with with sleep apnea? NO    Do other members of your family have restless leg? NO    Do other members of your family have sleep walking? NO    Have you ever had an accident, or near accident due to sleepiness while driving? NO    Does your sleepiness affect your work on the job or at school? NO    Do you ever fall asleep by accident while doing a task? NO    Have you had sudden muscle weakness when laughing, angry or surprised? NO    Have you ever been unable to move your body when falling asleep or waking up? NO    Do you ever have trouble  your dreams from real life events? NO  Please describe:     Physical Health: (including illness and injury): During the past 30 days, on how many days was your physical health not good? 0/30 days     Mental Health: (including stress, depression, and problems with emotions): During the last 30 days, how may days was your mental health not good? 0/30 days.     During the past 30 days, on how many days did poor physical or mental health keep you from doing your usual activities? This might be self-care, work, or play? 0/30 days.     Social History:   Marital status:     Who lives in your home with you? wife    Mother (alive or dead)? dead If has , from what?   Father (alive or dead)? dead If has , from what?     Siblings: YES  Have any ? YES  If so, from what? Brother heart, 73    Currently working? 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     Sleepiness Scale:   Sitting and reading 0   Watching TV 2   Sitting in a public place 0   Riding in a car 1   Lying down to rest in the afternoon 3   Sitting and talking to someone 0   Sitting quietly after a lunch without alcohol 2   In a car, stopping for a few minutes in traffic 0       Surgical History:   Past Surgical History:    Procedure Laterality Date     ARTHROSCOPY KNEE WITH LATERAL MENISCECTOMY Left 10/22/2014    Procedure: ARTHROSCOPY KNEE WITH LATERAL MENISCECTOMY;  Surgeon: Alok Zapien MD;  Location: [facility] OR     COLONOSCOPY  2020    5 year fu, due 2025     [facility] STOMACH SURGERY PROCEDURE UNLISTED      appe 16 yrs old     [facility] TOTAL KNEE ARTHROPLASTY      reconstuction       Medical Conditions:   Past Medical History:   Diagnosis Date     Atrial fibrillation (H)     lone     Chronic atrial fibrillation (H) 4/21/2023     Essential hypertension, benign 4/21/2023     Torn meniscus     left knee       Medications:   Current Outpatient Medications   Medication Sig     atenolol (TENORMIN) 50 MG tablet Take 1 tablet (50 mg) by mouth 2 times daily     atorvastatin (LIPITOR) 10 MG tablet Take 1 tablet (10 mg) by mouth daily     diclofenac (VOLTAREN) 1 % GEL Place onto the skin 4 times daily Apply to left knee 4 times daily as needed, wash hands after application.     digoxin (LANOXIN) 250 MCG tablet Take 1 tablet (250 mcg) by mouth daily     enalapril (VASOTEC) 5 MG tablet Take 1 tablet (5 mg) by mouth 2 times daily     VASOTEC 5 MG OR TABS 1 tab twice daily     VITAMIN D, CHOLECALCIFEROL, PO Take by mouth daily     zinc gluconate 50 MG tablet Take 50 mg by mouth daily     Current Facility-Administered Medications   Medication     lidocaine 1 % injection 3 mL     methylPREDNISolone (DEPO-MEDROL) injection 40 mg     sodium hyaluronate (EUFLEXXA) injection 20 mg     sodium hyaluronate (EUFLEXXA) injection 20 mg     sodium hyaluronate (EUFLEXXA) injection 20 mg     sodium hyaluronate (EUFLEXXA) injection 20 mg     sodium hyaluronate (EUFLEXXA) injection 20 mg     sodium hyaluronate (EUFLEXXA) injection 20 mg     sodium hyaluronate (EUFLEXXA) injection 20 mg     sodium hyaluronate (EUFLEXXA) injection 20 mg     sodium hyaluronate (EUFLEXXA) injection 20 mg     triamcinolone (KENALOG-40) injection 40 mg     triamcinolone (KENALOG-40)  injection 40 mg     triamcinolone (KENALOG-40) injection 40 mg     triamcinolone (KENALOG-40) injection 40 mg       Are you currently having any of the following symptoms?   General:   Obvious weight gain or loss NO  Fever, chills or sweats NO  Drug allergies: no    Eyes:   Changes in vision NO  Blind spots NO  Double vision NO  Other no    Ear, Nose and Throat:   Ear pain NO  Sore throat NO  Sinus pain NO  Post-nasal drip YES  Runny nose NO  Bloody nose NO    Heart:   Rapid or irregular heart beat YES  Chest pain or pressure NO  Out of breath when lying down NO  Swelling in feet or legs NO  High blood pressure YES  Heart disease NO    Nervous system   Headaches NO  Weakness in arms or legs NO  Numbness in arms of legs NO  Other: no    Skin  Rashes NO  New moles or skin changes NO  Other no    Lungs  Shortness of breath at rest NO  Shortness of breath with activity NO  Dry cough NO  Coughing up mucous or phlegm NO  Coughing up blood NO  Wheezing when breathing NO    Lymph System  Swollen lymph nodes NO  New lumps or bumps NO  Changes in breasts or discharge NO    Digestive System   Nausea or vomiting NO  Loose or watery stools NO  Hard, dry stools (constipation) NO  Fat or grease in stools NO  Blood in stools NO  Stools are black or bloody NO  Abdominal (belly) pain NO    Urinary Tract   Pain when you urinate (pee) NO  Blood in your urine NO  Urinate (pee) more than normal NO  Irregular periods NO    Muscles and bones   Muscle pain YES  Joint or bone pain YES  Swollen joints YES  Other my knees    Glands  Increased thirst or urination NO  Diabetes NO  Morning glucose: no  Afternoon glucose: no    Mental Health  Depression NO  Anxiety NO  Other mental health issues: no

## 2023-05-23 NOTE — PROGRESS NOTES
STOP BANG       Name: Arie Hahn Jr. MRN# [id number]   Age: 57 year old YOB: 1965     Stop Bang questionnaire completed with a score of >3 to allow for HST     Have you been told you snore loudly (louder than talking or loud enough to be heard through doors)? YES    Do you often feel tired, fatigued, or sleepy during the daytime? YES    Has anyone observed you stop breathing during your sleep? YES    Do you have or are you being treated for high blood pressure? YES    Is your BMI greater than 35? YES    Is your neck size circumference 16 inches or greater? YES    Are you over 50 years old? YES    Stop Bang Score (# of yes): 8

## 2023-05-24 NOTE — PROGRESS NOTES
Chart review prior to sleep testing.    Patient Summary:  57 year old yo male who is referred for sleep-disordered breathing.    Patient Active Problem List    Diagnosis Date Noted     Chronic atrial fibrillation (H) 04/21/2023     Priority: Medium     AVNI (obstructive sleep apnea) - mild, not on CPAP 04/21/2023     Priority: Medium     Essential hypertension, benign 04/21/2023     Priority: Medium     Morbid obesity due to excess calories (H) 11/20/2019     Priority: Medium       Current Outpatient Medications   Medication     atenolol (TENORMIN) 50 MG tablet     atorvastatin (LIPITOR) 10 MG tablet     diclofenac (VOLTAREN) 1 % GEL     digoxin (LANOXIN) 250 MCG tablet     enalapril (VASOTEC) 5 MG tablet     VASOTEC 5 MG OR TABS     VITAMIN D, CHOLECALCIFEROL, PO     zinc gluconate 50 MG tablet     Current Facility-Administered Medications   Medication     lidocaine 1 % injection 3 mL     methylPREDNISolone (DEPO-MEDROL) injection 40 mg     sodium hyaluronate (EUFLEXXA) injection 20 mg     sodium hyaluronate (EUFLEXXA) injection 20 mg     sodium hyaluronate (EUFLEXXA) injection 20 mg     sodium hyaluronate (EUFLEXXA) injection 20 mg     sodium hyaluronate (EUFLEXXA) injection 20 mg     sodium hyaluronate (EUFLEXXA) injection 20 mg     sodium hyaluronate (EUFLEXXA) injection 20 mg     sodium hyaluronate (EUFLEXXA) injection 20 mg     sodium hyaluronate (EUFLEXXA) injection 20 mg     triamcinolone (KENALOG-40) injection 40 mg     triamcinolone (KENALOG-40) injection 40 mg     triamcinolone (KENALOG-40) injection 40 mg     triamcinolone (KENALOG-40) injection 40 mg       Pertinent PMHx of HTN, obesity, chronic atrial fibrillation.    Yes for prior sleep testing, [place] ~6 years ago.    2/12/2017 - PSG with weight 351 lbs, BMI 47.5.  RDI 16.5, AHI 12.2.  [place] SpO2 83%.    STOP-BANG score of 8, with unknown neck circumference.  [place] score of 8.  BMI of Estimated body mass index is 48.35 kg/m  as calculated from the  "following:    Height as of 4/21/23: 1.778 m (5' 10\").    Weight as of 4/21/23: 152.9 kg (337 lb).     Per questionnaire: \"To see if I have sleep apnea\"    Caffeine use:  No for 3+ per day.  Yes for within 6 hours of bed.    Tobacco use: No    Sleep pattern:  Workdays.  9:30pm - 5am, total sleep time 5-7 hours.  Weekends.  10-11pm to 6am, total sleep time 6-8 hours.  Time to fall asleep: ~5-10 minutes.  Awakenings: 4-5 times per night, 5-10 minutes to return to sleep, awake for total of 20 minutes per night.  Napping.  4 days per week, 0.5-1 hours per nap.    Yes for RLS screen.  No for sleep walking.  No for dream enactment behavior.  No for bruxism.    No for morning headaches.  Yes for snoring.  Yes for observed apnea.  No for FHx of AVNI.    SHx:  , lives with wife.  Works as .    A/P:    1.)  History of mild AVNI  - Co-morbid HTN, atrial fibrillation  - Interim weight loss of ~15 lbs  - Would appear to be candidate for either home sleep testing or in-lab PSG.    ---  This note was written with the assistance of the Dragon voice-dictation technology software. The final document, although reviewed, may contain errors. For corrections, please contact the office.    Eddie Avalos MD    Sleep Medicine    [place], [place]  o Main Office: [phone]    [facility] [place], [place]  o [address], [address]  o Schedule visits: [phone]  o Main Office: [phone]  o Fax: [phone]    "

## 2023-05-25 ENCOUNTER — TELEPHONE (OUTPATIENT)
Dept: PULMONOLOGY | Facility: OTHER | Age: 58
End: 2023-05-25

## 2023-06-06 ENCOUNTER — TRANSFERRED RECORDS (OUTPATIENT)
Dept: HEALTH INFORMATION MANAGEMENT | Facility: OTHER | Age: 58
End: 2023-06-06

## 2023-07-08 ENCOUNTER — HEALTH MAINTENANCE LETTER (OUTPATIENT)
Age: 58
End: 2023-07-08

## 2023-07-13 ENCOUNTER — OFFICE VISIT (OUTPATIENT)
Dept: PEDIATRICS | Facility: OTHER | Age: 58
End: 2023-07-13
Attending: INTERNAL MEDICINE
Payer: COMMERCIAL

## 2023-07-13 VITALS
SYSTOLIC BLOOD PRESSURE: 126 MMHG | BODY MASS INDEX: 45.1 KG/M2 | WEIGHT: 315 LBS | OXYGEN SATURATION: 96 % | TEMPERATURE: 97.1 F | HEART RATE: 64 BPM | DIASTOLIC BLOOD PRESSURE: 80 MMHG | RESPIRATION RATE: 16 BRPM | HEIGHT: 70 IN

## 2023-07-13 DIAGNOSIS — G47.33 OSA (OBSTRUCTIVE SLEEP APNEA): ICD-10-CM

## 2023-07-13 DIAGNOSIS — E78.2 MIXED HYPERLIPIDEMIA: ICD-10-CM

## 2023-07-13 DIAGNOSIS — I10 ESSENTIAL HYPERTENSION, BENIGN: ICD-10-CM

## 2023-07-13 DIAGNOSIS — I48.20 CHRONIC ATRIAL FIBRILLATION (H): ICD-10-CM

## 2023-07-13 DIAGNOSIS — Z01.818 PREOPERATIVE EXAMINATION: Primary | ICD-10-CM

## 2023-07-13 DIAGNOSIS — M17.12 PRIMARY OSTEOARTHRITIS OF LEFT KNEE: ICD-10-CM

## 2023-07-13 DIAGNOSIS — E66.01 MORBID OBESITY DUE TO EXCESS CALORIES (H): ICD-10-CM

## 2023-07-13 LAB
ANION GAP SERPL CALCULATED.3IONS-SCNC: 11 MMOL/L (ref 7–15)
ATRIAL RATE - MUSE: 68 BPM
BUN SERPL-MCNC: 14.3 MG/DL (ref 8–23)
CALCIUM SERPL-MCNC: 9.1 MG/DL (ref 8.6–10)
CHLORIDE SERPL-SCNC: 104 MMOL/L (ref 98–107)
CREAT SERPL-MCNC: 0.92 MG/DL (ref 0.67–1.17)
DEPRECATED HCO3 PLAS-SCNC: 22 MMOL/L (ref 22–29)
DIASTOLIC BLOOD PRESSURE - MUSE: NORMAL MMHG
ERYTHROCYTE [DISTWIDTH] IN BLOOD BY AUTOMATED COUNT: 13 % (ref 10–15)
GFR SERPL CREATININE-BSD FRML MDRD: >90 ML/MIN/1.73M2
GLUCOSE SERPL-MCNC: 107 MG/DL (ref 70–99)
HCT VFR BLD AUTO: 44.8 % (ref 40–53)
HGB BLD-MCNC: 15.9 G/DL (ref 13.3–17.7)
INTERPRETATION ECG - MUSE: NORMAL
MCH RBC QN AUTO: 32.4 PG (ref 26.5–33)
MCHC RBC AUTO-ENTMCNC: 35.5 G/DL (ref 31.5–36.5)
MCV RBC AUTO: 91 FL (ref 78–100)
P AXIS - MUSE: NORMAL DEGREES
PLATELET # BLD AUTO: 164 10E3/UL (ref 150–450)
POTASSIUM SERPL-SCNC: 4.2 MMOL/L (ref 3.4–5.3)
PR INTERVAL - MUSE: NORMAL MS
QRS DURATION - MUSE: 102 MS
QT - MUSE: 432 MS
QTC - MUSE: 378 MS
R AXIS - MUSE: 59 DEGREES
RBC # BLD AUTO: 4.91 10E6/UL (ref 4.4–5.9)
SODIUM SERPL-SCNC: 137 MMOL/L (ref 136–145)
SYSTOLIC BLOOD PRESSURE - MUSE: NORMAL MMHG
T AXIS - MUSE: 52 DEGREES
VENTRICULAR RATE- MUSE: 46 BPM
WBC # BLD AUTO: 5.2 10E3/UL (ref 4–11)

## 2023-07-13 PROCEDURE — 36415 COLL VENOUS BLD VENIPUNCTURE: CPT | Mod: ZL | Performed by: INTERNAL MEDICINE

## 2023-07-13 PROCEDURE — 99214 OFFICE O/P EST MOD 30 MIN: CPT | Performed by: INTERNAL MEDICINE

## 2023-07-13 PROCEDURE — 93000 ELECTROCARDIOGRAM COMPLETE: CPT | Performed by: INTERNAL MEDICINE

## 2023-07-13 PROCEDURE — 80048 BASIC METABOLIC PNL TOTAL CA: CPT | Mod: ZL | Performed by: INTERNAL MEDICINE

## 2023-07-13 PROCEDURE — 85027 COMPLETE CBC AUTOMATED: CPT | Mod: ZL | Performed by: INTERNAL MEDICINE

## 2023-07-13 RX ORDER — ATORVASTATIN CALCIUM 10 MG/1
10 TABLET, FILM COATED ORAL DAILY
Qty: 90 TABLET | Refills: 3 | Status: SHIPPED | OUTPATIENT
Start: 2023-07-13 | End: 2024-08-28

## 2023-07-13 ASSESSMENT — PAIN SCALES - GENERAL: PAINLEVEL: MODERATE PAIN (5)

## 2023-07-13 NOTE — PATIENT INSTRUCTIONS
-- No change to prescriptions     -- Hold aspirin for 5-7 days before surgery, unless you have had a stent then continue aspirin.   -- Hold ibuprofen/Advil for 2-3 days before surgery   -- Hold naproxen/Aleve for 5-7 days before surgery   -- Acetaminophen (Tylenol) is okay   -- Hold vitamins and herbal remedies for 7 days before surgery      The 10-year ASCVD risk score (Cathryn QUIROZ, et al., 2019) is: 10.4%    Values used to calculate the score:      Age: 57 years      Sex: Male      Is Non- : No      Diabetic: No      Tobacco smoker: No      Systolic Blood Pressure: 126 mmHg      Is BP treated: Yes      HDL Cholesterol: 45 mg/dL      Total Cholesterol: 215 mg/dL

## 2023-07-13 NOTE — Clinical Note
HIM: Please send a copy of my H&P/note, last EKG scan and report.  Signed, Howard Champagne MD, FAAP, FACP Internal Medicine & Pediatrics

## 2023-07-13 NOTE — NURSING NOTE
Chief Complaint   Patient presents with     Pre-Op Exam         Medication Reconciliation: complete    Hayley Pack, LPN

## 2023-07-13 NOTE — PROGRESS NOTES
[facility]  [address]  [place] [address]  Phone: [phone]  Fax: [phone]  Primary Provider: No Ref-Primary, Physician  Pre-op Performing Provider: DELMY NIELSEN      PREOPERATIVE EVALUATION:  Today's date: 7/13/2023    Arie Hahn Jr. is a 57 year old male who presents for a preoperative evaluation.    Surgical Information:  Surgery/Procedure: left total knee arthroplasty  Surgery Location: [facility]  Surgeon: Dr. Eric Severson   Surgery Date: 7/19/2023  Time of Surgery: TBD  Where patient plans to recover: At home with family  Fax number for surgical facility: [phone]    Preoperative History & Physical   Date of Exam: 7/13/2023  Chief Complaint   Patient presents with     Pre-Op Exam       Assessment & Recommendations       ICD-10-CM    1. Preoperative examination  Z01.818 EKG 12-lead, tracing only     CBC with platelets     Basic metabolic panel     CBC with platelets     Basic metabolic panel      2. Primary osteoarthritis of left knee  M17.12       3. Morbid obesity due to excess calories (H)  E66.01       4. AVNI (obstructive sleep apnea) - mild, not on CPAP  G47.33       5. Essential hypertension, benign  I10 Basic metabolic panel     Basic metabolic panel      6. Chronic atrial fibrillation (H)  I48.20 EKG 12-lead, tracing only     CBC with platelets     CBC with platelets      7. Mixed hyperlipidemia  E78.2 atorvastatin (LIPITOR) 10 MG tablet          For above listed surgery and anesthesia:   Patient is at increased risk for perioperative complications based on hypertension, atrial fibrillation, obesity, mild obstructive sleep apnea.    APPROVAL GIVEN to proceed with proposed procedure, without further diagnostic evaluation    Patient is on chronic pain medications: No  Patient is on antiplatelet/anticoagulation: Yes  Other medications that need adjustment perioperatively: No  Patient Instructions    -- No change to prescriptions     -- Hold  aspirin for 5-7 days before surgery, unless you have had a stent then continue aspirin.   -- Hold ibuprofen/Advil for 2-3 days before surgery   -- Hold naproxen/Aleve for 5-7 days before surgery   -- Acetaminophen (Tylenol) is okay   -- Hold vitamins and herbal remedies for 7 days before surgery      The 10-year ASCVD risk score (Cathryn QUIROZ, et al., 2019) is: 10.4%    Values used to calculate the score:      Age: 57 years      Sex: Male      Is Non- : No      Diabetic: No      Tobacco smoker: No      Systolic Blood Pressure: 126 mmHg      Is BP treated: Yes      HDL Cholesterol: 45 mg/dL      Total Cholesterol: 215 mg/dL        Signed, Howard Champagne MD, FAAP, FACP  Internal Medicine & Pediatrics      Subjective   I was asked to see . Arie Hahn Jr. by Dr. Severson for preoperative management.    Arie Hahn Jr. is a 57 year old male with a history of hypertension, AVNI, obesity here for preoperative examination.  He has been making significant lifestyle modification over the course of the last  3 months and has successfully lost 30 pounds.  The most physically active he has been over the past 2 weeks was: [employer] without chest pain.    Patient Active Problem List    Diagnosis Date Noted     Chronic atrial fibrillation (H) 04/21/2023     Priority: Medium     AVNI (obstructive sleep apnea) - mild, not on CPAP 04/21/2023     Priority: Medium     Essential hypertension, benign 04/21/2023     Priority: Medium     Morbid obesity due to excess calories (H) 11/20/2019     Priority: Medium     Past Medical History:   Diagnosis Date     Atrial fibrillation (H)     lone     Chronic atrial fibrillation (H) 4/21/2023     Essential hypertension, benign 4/21/2023     Torn meniscus     left knee     Past Surgical History:   Procedure Laterality Date     ARTHROSCOPY KNEE WITH LATERAL MENISCECTOMY Left 10/22/2014    Procedure: ARTHROSCOPY KNEE WITH LATERAL MENISCECTOMY;  Surgeon: Alok Zapien,  MD;  Location: [facility] OR     COLONOSCOPY  2020    5 year fu, due 2025     KNEE LIGAMENT RECONSTRUCTION Right     [facility] [place]     [facility] STOMACH SURGERY PROCEDURE UNLISTED      appe 16 yrs old     [facility] TOTAL KNEE ARTHROPLASTY      reconstuction     Current Outpatient Medications   Medication Sig Dispense Refill     atenolol (TENORMIN) 50 MG tablet Take 1 tablet (50 mg) by mouth 2 times daily 180 tablet 3     atorvastatin (LIPITOR) 10 MG tablet Take 1 tablet (10 mg) by mouth daily 90 tablet 3     diclofenac (VOLTAREN) 1 % GEL Place onto the skin 4 times daily Apply to left knee 4 times daily as needed, wash hands after application. 1 Tube 1     digoxin (LANOXIN) 250 MCG tablet Take 1 tablet (250 mcg) by mouth daily 90 tablet 4     enalapril (VASOTEC) 5 MG tablet Take 1 tablet (5 mg) by mouth 2 times daily 180 tablet 3     VITAMIN D, CHOLECALCIFEROL, PO Take by mouth daily       zinc gluconate 50 MG tablet Take 50 mg by mouth daily       No Known Allergies  Family History   Problem Relation Age of Onset     Diabetes Mother      Hypertension Mother      Cancer Mother      Arrhythmia No family hx of      Anesthesia Reaction No family hx of      Bleeding Diathesis No family hx of      Social History     Socioeconomic History     Marital status:      Spouse name: None     Number of children: None     Years of education: None     Highest education level: None   Tobacco Use     Smoking status: Never     Smokeless tobacco: Never   Vaping Use     Vaping Use: Never used   Substance and Sexual Activity     Alcohol use: Yes     Alcohol/week: 4.0 standard drinks of alcohol     Types: 4 Standard drinks or equivalent per week     Comment: weekends     Drug use: No     Sexual activity: Yes     Partners: Female       REVIEW OF SYSTEMS:  Review of Systems:  Skin: Negative  Eyes: Negative  Ears/Nose/Throat: Negative  Respiratory: Negative  Cardiovascular: Negative  Gastrointestinal: Negative  Genitourinary: Negative  Musculoskeletal: See  "HPI  Neurologic: Negative  Psychiatric: Negative  Hematologic/Lymphatic/Immunologic: Negative  Endocrine: Negative        Objective   Vitals: reviewed in EMR.  /80   Pulse 64   Temp 97.1  F (36.2  C) (Tympanic)   Resp 16   Ht 1.778 m (5' 10\")   Wt 144.2 kg (318 lb)   SpO2 96%   BMI 45.63 kg/m      Gen: Pleasant male, NAD.  HEENT: MMM, no OP erythema.   Neck: Supple, no JVD, no bruits.  CV: RRR no m/r/g.   Pulm: CTAB no w/r/r  Neuro: Grossly intact  Msk: No lower extremity edema.  Skin: No concerning lesions.  Psychiatric: Normal affect and insight. Does not appear anxious or depressed.    DIAGNOSTICS:   Results for orders placed or performed in visit on 07/13/23 (from the past 48 hour(s))   EKG 12-lead, tracing only   Result Value Ref Range    Systolic Blood Pressure  mmHg    Diastolic Blood Pressure  mmHg    Ventricular Rate 46 BPM    Atrial Rate 68 BPM    NC Interval  ms    QRS Duration 102 ms     ms    QTc 378 ms    P Axis  degrees    R AXIS 59 degrees    T Axis 52 degrees    Interpretation ECG       Atrial fibrillation with slow ventricular response  Incomplete right bundle branch block  Abnormal ECG  When compared with ECG of 22-OCT-2014 14:03,  Vent. rate has decreased BY  28 BPM  QT has shortened  Confirmed by MD GIA, DELMY ([id number]) on 7/13/2023 9:59:49 AM     CBC with platelets   Result Value Ref Range    WBC Count 5.2 4.0 - 11.0 10e3/uL    RBC Count 4.91 4.40 - 5.90 10e6/uL    Hemoglobin 15.9 13.3 - 17.7 g/dL    Hematocrit 44.8 40.0 - 53.0 %    MCV 91 78 - 100 fL    MCH 32.4 26.5 - 33.0 pg    MCHC 35.5 31.5 - 36.5 g/dL    RDW 13.0 10.0 - 15.0 %    Platelet Count 164 150 - 450 10e3/uL   Basic metabolic panel   Result Value Ref Range    Sodium 137 136 - 145 mmol/L    Potassium 4.2 3.4 - 5.3 mmol/L    Chloride 104 98 - 107 mmol/L    Carbon Dioxide (CO2) 22 22 - 29 mmol/L    Anion Gap 11 7 - 15 mmol/L    Urea Nitrogen 14.3 8.0 - 23.0 mg/dL    Creatinine 0.92 0.67 - 1.17 mg/dL    Calcium " 9.1 8.6 - 10.0 mg/dL    Glucose 107 (H) 70 - 99 mg/dL    GFR Estimate >90 >60 mL/min/1.73m2         CC: Dr. Dr. Eric Severson, [facility], fax number [phone]

## 2023-07-26 ENCOUNTER — THERAPY VISIT (OUTPATIENT)
Dept: PHYSICAL THERAPY | Facility: OTHER | Age: 58
End: 2023-07-26
Attending: ORTHOPAEDIC SURGERY
Payer: COMMERCIAL

## 2023-07-26 DIAGNOSIS — Z96.652 HISTORY OF TOTAL KNEE ARTHROPLASTY, LEFT: ICD-10-CM

## 2023-07-26 PROCEDURE — 97110 THERAPEUTIC EXERCISES: CPT | Mod: GP,PO

## 2023-07-26 PROCEDURE — 97161 PT EVAL LOW COMPLEX 20 MIN: CPT | Mod: GP,PO

## 2023-07-26 NOTE — PROGRESS NOTES
PHYSICAL THERAPY EVALUATION  Type of Visit: Evaluation    See electronic medical record for Abuse and Falls Screening details.    Subjective       Presenting condition or subjective complaint: Patient is a 57 year old male referred to physical therapy with left TKA done by Dr. Severson on 7/19/2023. He reports that he has been doing pretty good since surgery. Pain has been well controlled with his pain medication and ice. He notes that most of his pain is in the thigh vs the knee. He stopped taking the oxycodone because it made him more constipated and he didn't like the way it made him feel. He has been doing his exercises at home that he was given by the [facility] therapist in [place].  Date of onset: 07/19/23    Relevant medical history: High blood pressure   Dates & types of surgery: Right knee reconstruction in 2014, left knee scope 2017      Prior Level of Function  Transfers: Independent  Ambulation: Independent  ADL: Independent      Living Environment  Social support: With a significant other or spouse   Stairs to enter the home: Yes 3 Is there a railing: No   Ramp: No   Stairs inside the home: Yes 7 Is there a railing: Yes   Equipment owned: Straight Cane; Walker; Crutches     Employment: No Retired    Patient goals for therapy: Ambulation, transfer    Pain assessment: Location: left knee/Rating: Best: 3/10, worst: 7/10     Objective   KNEE EVALUATION  PAIN: Pain Quality: Aching, Dull, and Sharp  Pain Frequency: constant  Pain is Exacerbated By: walking, lifting, carrying, certain positions  Pain is Relieved By: cold and rest  INTEGUMENTARY (edema, incisions):  Incision healing well no signs of infection. Bruising noted in thigh and swelling around his knee. Dressing intact.   POSTURE:  Slight protraction of shoulders  GAIT:  Weightbearing Status: WBAT  Assistive Device(s): Walker (front wheeled)  Gait Deviations:  Patient ambulates with slower gait speed in walker. Notes to have lack of heel strike and push  off on left side. Step to pattern.   BALANCE/PROPRIOCEPTION:  good stability within his walker.   ROM:   (Degrees) Left AROM Left PROM  Right AROM Right PROM   Knee Flexion 90 degrees 96 degrees     Knee Extension 10 degrees 8 degrees       STRENGTH:  Not tested  SPECIAL TESTS: n/a    Assessment & Plan   CLINICAL IMPRESSIONS  Medical Diagnosis: History of total knee arthroplasty, left (Z96.652)    Treatment Diagnosis: Impaired mobility, s/p tka, impaired gait and balance, decreased strength and endurance   Impression/Assessment: Patient is a 57 year old male with left knee complaints.  The following significant findings have been identified: Pain, Decreased ROM/flexibility, Decreased joint mobility, Decreased strength, Impaired balance, Decreased proprioception, Impaired sensation, Inflammation, Edema, Impaired gait, Impaired muscle performance, and Decreased activity tolerance. These impairments interfere with their ability to perform self care tasks, work tasks, recreational activities, household chores, driving , household mobility, and community mobility as compared to previous level of function.     Clinical Decision Making (Complexity):  Clinical Presentation: Stable/Uncomplicated  Clinical Presentation Rationale: based on medical and personal factors listed in PT evaluation  Clinical Decision Making (Complexity): Low complexity    PLAN OF CARE  Treatment Interventions:  Modalities: Cryotherapy, E-stim, Hot Pack, Ultrasound, Vasoneumatic Device  Interventions: Gait Training, Manual Therapy, Neuromuscular Re-education, Therapeutic Activity, Therapeutic Exercise, Aquatic Therapy    Long Term Goals     PT Goal 1  Goal Identifier: Ambulation  Goal Description: Patient will be able to ambulate for longer than 10 minutes with no AD on even and uneven surfaces with no loss of balance and no increase in pain in order to improve his overall mobility within the community  Rationale: to maximize safety and independence  within the community  Target Date: 09/20/23  PT Goal 2  Goal Identifier: Stairs  Goal Description: Patient will be able to ascend/descend 1 flight of stair with no AD and no loss of balance consistently in order to 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  Rationale: to maximize safety and independence within the community;to maximize safety and independence within the home  Target Date: 09/20/23  PT Goal 3  Goal Identifier: Housework  Goal Description: Patient will be able to complete all indoor and outdoor housework, such as raking, cutting grass, and laundry, with no increase in knee pain and no loss of balance in order to improve his overall function at home  Rationale: to maximize safety and independence within the home  Target Date: 09/20/23      Frequency of Treatment: 1-2 times per week  Duration of Treatment: 8 weeks    Education Assessment:   Learner/Method: Patient  Education Comments: Educated on protocol and prognosis.    Risks and benefits of evaluation/treatment have been explained.   Patient/Family/caregiver agrees with Plan of Care.     Evaluation Time:     PT Eval, Low Complexity Minutes (72779): 25     Signing Clinician: Dominic Olmedo PT

## 2023-07-27 PROBLEM — Z96.652 HISTORY OF TOTAL KNEE ARTHROPLASTY, LEFT: Status: ACTIVE | Noted: 2023-07-27

## 2023-07-31 ENCOUNTER — THERAPY VISIT (OUTPATIENT)
Dept: PHYSICAL THERAPY | Facility: OTHER | Age: 58
End: 2023-07-31
Attending: ORTHOPAEDIC SURGERY
Payer: COMMERCIAL

## 2023-07-31 DIAGNOSIS — Z96.652 HISTORY OF TOTAL KNEE ARTHROPLASTY, LEFT: Primary | ICD-10-CM

## 2023-07-31 PROCEDURE — 97110 THERAPEUTIC EXERCISES: CPT | Mod: GP

## 2023-08-03 ENCOUNTER — THERAPY VISIT (OUTPATIENT)
Dept: PHYSICAL THERAPY | Facility: OTHER | Age: 58
End: 2023-08-03
Attending: ORTHOPAEDIC SURGERY
Payer: COMMERCIAL

## 2023-08-03 DIAGNOSIS — Z96.652 HISTORY OF TOTAL KNEE ARTHROPLASTY, LEFT: Primary | ICD-10-CM

## 2023-08-03 PROCEDURE — 97016 VASOPNEUMATIC DEVICE THERAPY: CPT | Mod: GP,PO

## 2023-08-03 PROCEDURE — 97110 THERAPEUTIC EXERCISES: CPT | Mod: GP,PO

## 2023-08-08 ENCOUNTER — THERAPY VISIT (OUTPATIENT)
Dept: PHYSICAL THERAPY | Facility: OTHER | Age: 58
End: 2023-08-08
Attending: ORTHOPAEDIC SURGERY
Payer: COMMERCIAL

## 2023-08-08 DIAGNOSIS — Z96.652 HISTORY OF TOTAL KNEE ARTHROPLASTY, LEFT: Primary | ICD-10-CM

## 2023-08-08 PROCEDURE — 97140 MANUAL THERAPY 1/> REGIONS: CPT | Mod: GP,PO

## 2023-08-08 PROCEDURE — 97110 THERAPEUTIC EXERCISES: CPT | Mod: GP,PO

## 2023-08-10 ENCOUNTER — THERAPY VISIT (OUTPATIENT)
Dept: PHYSICAL THERAPY | Facility: OTHER | Age: 58
End: 2023-08-10
Attending: ORTHOPAEDIC SURGERY
Payer: COMMERCIAL

## 2023-08-10 DIAGNOSIS — Z96.652 HISTORY OF TOTAL KNEE ARTHROPLASTY, LEFT: Primary | ICD-10-CM

## 2023-08-10 PROCEDURE — 97110 THERAPEUTIC EXERCISES: CPT | Mod: GP,PO

## 2023-08-10 PROCEDURE — 97140 MANUAL THERAPY 1/> REGIONS: CPT | Mod: GP,PO

## 2023-08-15 ENCOUNTER — THERAPY VISIT (OUTPATIENT)
Dept: PHYSICAL THERAPY | Facility: OTHER | Age: 58
End: 2023-08-15
Attending: ORTHOPAEDIC SURGERY
Payer: COMMERCIAL

## 2023-08-15 DIAGNOSIS — Z96.652 HISTORY OF TOTAL KNEE ARTHROPLASTY, LEFT: Primary | ICD-10-CM

## 2023-08-15 PROCEDURE — 97110 THERAPEUTIC EXERCISES: CPT | Mod: GP,PO

## 2023-08-17 ENCOUNTER — THERAPY VISIT (OUTPATIENT)
Dept: PHYSICAL THERAPY | Facility: OTHER | Age: 58
End: 2023-08-17
Attending: ORTHOPAEDIC SURGERY
Payer: COMMERCIAL

## 2023-08-17 DIAGNOSIS — Z96.652 HISTORY OF TOTAL KNEE ARTHROPLASTY, LEFT: Primary | ICD-10-CM

## 2023-08-17 PROCEDURE — 97110 THERAPEUTIC EXERCISES: CPT | Mod: GP,PO

## 2023-08-22 ENCOUNTER — THERAPY VISIT (OUTPATIENT)
Dept: PHYSICAL THERAPY | Facility: OTHER | Age: 58
End: 2023-08-22
Attending: ORTHOPAEDIC SURGERY
Payer: COMMERCIAL

## 2023-08-22 DIAGNOSIS — Z96.652 HISTORY OF TOTAL KNEE ARTHROPLASTY, LEFT: Primary | ICD-10-CM

## 2023-08-22 PROCEDURE — 97110 THERAPEUTIC EXERCISES: CPT | Mod: GP,PO

## 2023-08-22 PROCEDURE — 97016 VASOPNEUMATIC DEVICE THERAPY: CPT | Mod: GP,PO

## 2023-08-29 ENCOUNTER — THERAPY VISIT (OUTPATIENT)
Dept: PHYSICAL THERAPY | Facility: OTHER | Age: 58
End: 2023-08-29
Attending: ORTHOPAEDIC SURGERY
Payer: COMMERCIAL

## 2023-08-29 DIAGNOSIS — Z96.652 HISTORY OF TOTAL KNEE ARTHROPLASTY, LEFT: Primary | ICD-10-CM

## 2023-08-29 PROCEDURE — 97110 THERAPEUTIC EXERCISES: CPT | Mod: GP,PO

## 2023-08-29 PROCEDURE — 97140 MANUAL THERAPY 1/> REGIONS: CPT | Mod: GP,PO

## 2023-09-01 ENCOUNTER — THERAPY VISIT (OUTPATIENT)
Dept: CHIROPRACTIC MEDICINE | Facility: OTHER | Age: 58
End: 2023-09-01
Attending: CHIROPRACTOR
Payer: COMMERCIAL

## 2023-09-01 VITALS
TEMPERATURE: 97.7 F | SYSTOLIC BLOOD PRESSURE: 118 MMHG | HEART RATE: 72 BPM | DIASTOLIC BLOOD PRESSURE: 78 MMHG | RESPIRATION RATE: 18 BRPM | OXYGEN SATURATION: 96 %

## 2023-09-01 DIAGNOSIS — M51.379 DEGENERATION OF LUMBAR OR LUMBOSACRAL INTERVERTEBRAL DISC: ICD-10-CM

## 2023-09-01 DIAGNOSIS — M99.04 SEGMENTAL AND SOMATIC DYSFUNCTION OF SACRAL REGION: Primary | ICD-10-CM

## 2023-09-01 DIAGNOSIS — M53.3 SI (SACROILIAC) JOINT DYSFUNCTION: ICD-10-CM

## 2023-09-01 PROCEDURE — 98940 CHIROPRACT MANJ 1-2 REGIONS: CPT | Mod: AT | Performed by: CHIROPRACTOR

## 2023-09-01 PROCEDURE — 99213 OFFICE O/P EST LOW 20 MIN: CPT | Mod: 25 | Performed by: CHIROPRACTOR

## 2023-09-01 NOTE — PROGRESS NOTES
Bilateral lower back is intermittent deep aching.  2/10 W24 9/10. Stretches are sometimes helping.   Danni Lynn  on 9/1/2023 at 9:42 AM    Reviewed by EW    PATIENT:  Arie Hahn Jr. is a 57 year old male presenting for low back pain    PROBLEM:   Date of Initial Visit for this Episode:  9/1/2023    Visit #1    SUBJECTIVE / HPI: Patient presents with complaints of bilateral low back pain no sciatica noted at this time.  Patient underwent total knee replacement 6 weeks ago and notes good results in progress with physical therapy.  Recently recommended to follow-up with our office by physical therapist.  Low back concerns have been ongoing.  Patient used to work as a  for [employer] for many years.  Reports working with chiropractic provider in the [place] region utilizing drop table technique and having good results with this.  MRI study has been performed.  Patient is also undergone injections of the lumbopelvic spine.  First shot went quite well and subsequent injections seem to decrease in efficacy.    Due to left knee concerns patient has been standing with weight shifted to the right side causing issues with low back.  Has worked with chiropractic providers in the [facility] with limited success.  At this time patient does not have any red flags consistent with cauda equina.        (DVPRS) Pain Rating Score : 2-Notice pain, does not interfere with activities (W24 9/10) (09/01/23 0937)        See flowsheets in chart for details.  9/1/2023         No data to display               Oswestry (TIN) Questionnaire        9/1/2023     9:43 AM   OSWESTRY DISABILITY INDEX   Count 9   Sum 6   Oswestry Score (%) 13.33 %      PAST MEDICAL HISTORY:  Past Medical History:   Diagnosis Date    Atrial fibrillation (H)     lone    Chronic atrial fibrillation (H) 4/21/2023    Essential hypertension, benign 4/21/2023    Torn meniscus     left knee       PAST SURGICAL HISTORY:  Past Surgical History:    Procedure Laterality Date    ARTHROSCOPY KNEE WITH LATERAL MENISCECTOMY Left 10/22/2014    Procedure: ARTHROSCOPY KNEE WITH LATERAL MENISCECTOMY;  Surgeon: Alok Zapien MD;  Location: [facility]    COLONOSCOPY  2020    5 year fu, due 2025    KNEE LIGAMENT RECONSTRUCTION Right     [facility]    [facility] STOMACH SURGERY PROCEDURE UNLISTED      appe 16 yrs old    [facility] TOTAL KNEE ARTHROPLASTY      reconstuction       ALLERGIES:  No Known Allergies    CURRENT MEDICATIONS:  Current Outpatient Medications   Medication Sig Dispense Refill    atenolol (TENORMIN) 50 MG tablet Take 1 tablet (50 mg) by mouth 2 times daily 180 tablet 3    diclofenac (VOLTAREN) 1 % GEL Place onto the skin 4 times daily Apply to left knee 4 times daily as needed, wash hands after application. 1 Tube 1    digoxin (LANOXIN) 250 MCG tablet Take 1 tablet (250 mcg) by mouth daily 90 tablet 4    enalapril (VASOTEC) 5 MG tablet Take 1 tablet (5 mg) by mouth 2 times daily 180 tablet 3    VITAMIN D, CHOLECALCIFEROL, PO Take by mouth daily      zinc gluconate 50 MG tablet Take 50 mg by mouth daily      atorvastatin (LIPITOR) 10 MG tablet Take 1 tablet (10 mg) by mouth daily 90 tablet 3       SOCIAL HISTORY:  Social History     Socioeconomic History    Marital status:    Tobacco Use    Smoking status: Never    Smokeless tobacco: Never   Vaping Use    Vaping Use: Never used   Substance and Sexual Activity    Alcohol use: Yes     Alcohol/week: 4.0 standard drinks of alcohol     Types: 4 Standard drinks or equivalent per week     Comment: weekends    Drug use: No    Sexual activity: Yes     Partners: Female        FAMILY HISTORY:  Family History   Problem Relation Age of Onset    Diabetes Mother     Hypertension Mother     Cancer Mother     Arrhythmia No family hx of     Anesthesia Reaction No family hx of     Bleeding Diathesis No family hx of        Patient Active Problem List   Diagnosis    Morbid obesity due to excess calories (H)    Chronic atrial  fibrillation (H)    AVNI (obstructive sleep apnea) - mild, not on CPAP    Essential hypertension, benign    History of total knee arthroplasty, left         ROS:  The patient denies any fevers, chills, nausea, vomiting, diarrhea, constipation,dysuria, hematuria, or urinary hesitancy or incontinence.  No shortness of breath, chest pain, or rashes.    OBJECTIVE:    DIAGNOSTICS:  Study Result    Narrative & Impression   MR LUMBAR SPINE W/O CONTRAST 2/14/2022 7:14 AM     Provided History: Osteoarthritis, lumbosacral; Low back pain, > 6 wks;  DDD (degenerative disc disease), lumbar; Lumbar disc herniation with  radiculopathy     ICD-10: DDD (degenerative disc disease), lumbar; Lumbar disc  herniation with radiculopathy     Comparison: 10/11/2021 radiograph     Technique: Sagittal T1-weighted, sagittal STIR,  and 3D volumetric  axial and sagittal reconstructed T2-weighted images of the lumbar  spine were obtained without intravenous contrast.      Findings: There are 5 lumbar-type vertebrae assumed for the purposes  of this dictation.  The tip of the conus medullaris is at L1.  Minimal  retrolisthesis is at L1-2.  Mild rightward scoliosis of the upper  lumbar spine. There is multilevel mild disc height narrowing and disc  degeneration, most significant at L1/2 and.  Normal marrow signal.  Facet arthropathy throughout.     On a level by level basis:     T12-L1: Broad-based anterior disc protrusion no significant neural  foraminal or spinal canal stenosis.     L1-2: Anterior disc protrusion covered with osteophytes. Minimal  retrolisthesis. Facet hypertrophy leading to bilateral mild neural  foraminal narrowing without significant spinal canal stenosis.     L2-3: No significant spinal canal or foraminal stenosis.     L3-4: Disc bulge and facet hypertrophy. Bilateral minimal neural  foraminal narrowing without significant spinal canal stenosis.     L4-5: Disc bulge and tiny central disc protrusion. Bilateral  facet  hypertrophy. Minimal neural foraminal narrowing without significant  spinal canal stenosis.   L5-S1: Right foraminal/subarticular protrusion with resultant right  moderate neural foraminal narrowing. No significant spinal canal  stenosis.     Paraspinous tissues are within normal limits.                                                                      Impression: Multilevel degenerative disc and spine changes. Most  significant findings are at L5-S1 where there is right foraminal disc  protrusion with resultant moderate right neural foraminal narrowing.  No significant spinal canal stenosis at any level.     I have personally reviewed the examination and initial interpretation  and I agree with the findings.     ALAN WATTS MD         SYSTEM ID:  [id number]     PHYSICAL EXAM:   /78 (BP Location: Right arm, Patient Position: Sitting, Cuff Size: Adult Large)   Pulse 72   Temp 97.7  F (36.5  C) (Tympanic)   Resp 18   SpO2 96%    GENERAL APPEARANCE: healthy, alert, no distress, cooperative, and over weight   GAIT: limping      MUSCULOSKELETAL:   Posture: Anterior head carriage, rounded shoulders.  Low left iliac crest  Gait:  limping gait      Thoracic and Lumbar  ROM: Generally unremarkable, endrange pain with extension on the left SI joint    +Kemps: Left SI joint  - Slumps  Other:  Ely's: -right, +left    +Tenderness: None reported by patient  +Muscle spasm: None apparent.  Hypertonicity noted quadratus lumborum and lumbar paraspinals bilaterally left greater than right +Joint asymmetry and restriction: Left ilium extension restriction, sacrum extension left lateral flexion restriction    ASSESSMENT: Arie FIDEL Hahn . is a 57 year old male presenting with primary complaints of low back pain following total knee.  Patient does have prior imaging showing degenerative changes of the lumbopelvic spine.  Chiropractic care does seem appropriate given today's exam findings and patient presentation.   As patient has responded well to drop table technique and not diversified/side posture this will be utilized.  I would recommend this anyway given patient's proximity to recent total knee replacement.  There are no contraindications precluding patient receiving care today and all questions were answered to patient's satisfaction prior to fracture treatment.     1. Segmental and somatic dysfunction of sacral region    2. Degeneration of lumbar or lumbosacral intervertebral disc    3. SI (sacroiliac) joint dysfunction        PLAN    Evaluation and Management:  50911 Moderate exam established patient 20 min    Procedures:  Modalities:  None performed this visit    CMT:  34481 Chiropractic manipulative treatment 1-2 regions performed   Pelvis: Drop Table, Sacrum , PSIS Left , Prone    Therapeutic procedures:  None    Response to Treatment  Reduction in symptoms as reported by patient    Prognosis: Good    9/1/2023 Plan of Care:  6-8 visits of Chiropractic Care including Spinal Adjustments and/or physiotherapy and active rehabilitation, to include exercises in the office and/or at home to meet care plan goals.     Frequency: 1-2 x week for up to 4-6 weeks. A reevaluation would be clinically appropriate in 6-8 visits, to determine progress and further course of care.    POC discussed and patient agreeable to plan of care.      9/1/2023 Goals:      Patient will report improved pain by 75%.   Patient will report able to stand with less difficulty as shown by 1-2 point decrease on oswestry.   Patient will demonstrate an improved ability to complete Activities of Daily Living  as shown by a reported 10-30% reduced score on neck and/or back index.    Patient will demonstrate improved extension spinal motion.        INSTRUCTIONS   ice 20 minutes every other hour as needed and heat 15 minutes every other hour as needed    Follow-up:  Return to care in 1 week.

## 2023-12-26 NOTE — PROGRESS NOTES
08/29/23 0500   Appointment Info   Signing clinician's name / credentials Dominic Olmedo DPT   Total/Authorized Visits 9   Visits Used 9/10   Medical Diagnosis History of total knee arthroplasty, left (Z96.652)   PT Tx Diagnosis Impaired mobility, s/p tka, impaired gait and balance, decreased strength and endurance   Other pertinent information Physical therapy evaluation and treat status post left total knee arthroplasty scheduled for 7/19/23.   Progress Note/Certification   Onset of illness/injury or Date of Surgery 07/19/23   Therapy Frequency 1-2 times per week   Predicted Duration 8 weeks   Progress Note Due Date 09/20/23   Supervision   PT Assistant Visit Number 1       Present No   GOALS   PT Goals 2;3   PT Goal 1   Goal Identifier Ambulation   Goal Description Patient will be able to ambulate for longer than 10 minutes with no AD on even and uneven surfaces with no loss of balance and no increase in pain in order to improve his overall mobility within the community   Rationale to maximize safety and independence within the community   Target Date 09/20/23   PT Goal 2   Goal Identifier Stairs   Goal Description Patient will be able to ascend/descend 1 flight of stair with no AD and no loss of balance consistently in order to 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   Rationale to maximize safety and independence within the community;to maximize safety and independence within the home   Target Date 09/20/23   PT Goal 3   Goal Identifier Housework   Goal Description Patient will be able to complete all indoor and outdoor housework, such as raking, cutting grass, and laundry, with no increase in knee pain and no loss of balance in order to improve his overall function at home   Rationale to maximize safety and independence within the home   Target Date 09/20/23   Subjective Report   Subjective Report Reports that he is doing really well. The knee just  "\"aches\" now that his thigh pain has improved. He hasn't used his cane in a few days however still has a small limp in his gait. Still dealing with back pain and is going to set up an appointment with chiropractor. He sees his surgeon later this week. We have no more appointment scheduled at this time due to how well he is doing. He will call back and schedule more if needed after his follow up with the surgeon.   Objective Measures   Objective Measures Objective Measure 1;Objective Measure 2   Objective Measure 1   Objective Measure Subjective pain rating   Details 1-2/10 in the knee   Objective Measure 2   Objective Measure ROM   Details 7-124   PT Modalities   PT Modalities Vasopneumatic device   Vasopneumatic Device   Treatment Detail game ready to Lt knee, 34 deg, med compression, pt supine with LE elevated.   Vasopneumatic Pressure medium   Duration 10 min   Temperature 34 degrees   Patient Response/Progress Tolerated well - did today as he was going to be in meetings all day   Treatment Interventions (PT)   Interventions Therapeutic Procedure/Exercise;Manual Therapy   Therapeutic Procedure/Exercise   Therapeutic Procedures: strength, endurance, ROM, flexibillity minutes (59718) 10   Ther Proc 1 - Details Upright bike for 10 minutes on level 3, reviewed HEP   Skilled Intervention VC/demo   Patient Response/Progress Tolerated well - demonstrated appropriate technique and verbalized understanding   Manual Therapy   Manual Therapy: Mobilization, MFR, MLD, friction massage minutes (17892) 15   Manual Therapy 1 - Details STm/MFR to posterior/lateral calf with patient in sitting and supine. Added STM to left hip and lumbar spine due to increased back pain with him improving his mobility.   Skilled Intervention STM   Patient Response/Progress Tolerated well   Education   Learner/Method Patient   Education Comments Educated on protocol and prognosis.   Plan   Home program TKA protocol   Plan for next session TKA " protocol   Total Session Time   Timed Code Treatment Minutes 25   Total Treatment Time (sum of timed and untimed services) 25     Unable to assess progress towards goals on this date because of an unplanned discharge.    DISCHARGE  Reason for Discharge: No further visits were needed/scheduled    Equipment Issued: none    Discharge Plan: Patient to continue home program.    Referring Provider:  Erik Severson

## 2024-03-18 ENCOUNTER — HOSPITAL ENCOUNTER (OUTPATIENT)
Dept: GENERAL RADIOLOGY | Facility: OTHER | Age: 59
Discharge: HOME | End: 2024-03-18
Attending: CHIROPRACTOR
Payer: COMMERCIAL

## 2024-03-18 ENCOUNTER — OFFICE VISIT (OUTPATIENT)
Dept: FAMILY MEDICINE | Facility: OTHER | Age: 59
End: 2024-03-18
Attending: CHIROPRACTOR
Payer: COMMERCIAL

## 2024-03-18 VITALS
SYSTOLIC BLOOD PRESSURE: 136 MMHG | DIASTOLIC BLOOD PRESSURE: 80 MMHG | HEIGHT: 71 IN | HEART RATE: 75 BPM | OXYGEN SATURATION: 95 % | BODY MASS INDEX: 44.1 KG/M2 | RESPIRATION RATE: 17 BRPM | WEIGHT: 315 LBS

## 2024-03-18 DIAGNOSIS — M99.03 SEGMENTAL DYSFUNCTION OF LUMBAR REGION: ICD-10-CM

## 2024-03-18 DIAGNOSIS — M54.50 CHRONIC MIDLINE LOW BACK PAIN WITHOUT SCIATICA: ICD-10-CM

## 2024-03-18 DIAGNOSIS — G89.29 CHRONIC MIDLINE LOW BACK PAIN WITHOUT SCIATICA: ICD-10-CM

## 2024-03-18 DIAGNOSIS — M54.50 CHRONIC MIDLINE LOW BACK PAIN WITHOUT SCIATICA: Primary | ICD-10-CM

## 2024-03-18 DIAGNOSIS — M99.05 SEGMENTAL DYSFUNCTION OF PELVIC REGION: ICD-10-CM

## 2024-03-18 DIAGNOSIS — G89.29 CHRONIC MIDLINE LOW BACK PAIN WITHOUT SCIATICA: Primary | ICD-10-CM

## 2024-03-18 PROCEDURE — 99207 PR BACK PROGRAM: CPT | Performed by: CHIROPRACTOR

## 2024-03-18 PROCEDURE — 72170 X-RAY EXAM OF PELVIS: CPT

## 2024-03-18 PROCEDURE — 72110 X-RAY EXAM L-2 SPINE 4/>VWS: CPT

## 2024-03-18 ASSESSMENT — PAIN SCALES - GENERAL: PAINLEVEL: EXTREME PAIN (8)

## 2024-03-18 NOTE — PROGRESS NOTES
CHIEF COMPLAINT:   Chief Complaint   Patient presents with    Consult       HISTORY OF PRESENTING INJURY     Viktor is a pleasant 58-year-old male here for consideration of her spine therapy program, self-referred.  He has a chronic history of low back pain with recent aggravation after bending forward and twisting to  a slipper at home.  He has tried multiple chiropractic providers in the area and has the most luck with [facility], who he had an appointment with 1 week ago with good results.  Apparently at the treatment concentrated on pelvic work with right piriformis trigger point therapy.  He was good for approximately 3 days and the pain returned.  He has a second appointment scheduled tomorrow.  He does have history of injection therapies which the first produced excellent results for approximately 3 to 4 months.  A second injection was performed at the 6-month interval and actually provoked his condition.  He also has a history of left knee surgery and up until recently had been favoring his right leg with standing.  Viktor is very hopeful to return to a more active lifestyle.  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  He does not have pain while driving.  He also notes no radiculopathy or signs/symptoms of cauda equina.  Imaging history is available for my review with the last MRI performed in 2022.      Oswestry (TIN) Questionnaire        3/18/2024    12:42 PM   OSWESTRY DISABILITY INDEX   Count 9   Sum 11   Oswestry Score (%) 24.44 %          PAST MEDICAL HISTORY:  Past Medical History:   Diagnosis Date    Atrial fibrillation (H)     lone    Chronic atrial fibrillation (H) 4/21/2023    Essential hypertension, benign 4/21/2023    Torn meniscus     left knee       PAST SURGICAL HISTORY:  Past Surgical History:   Procedure Laterality Date    ARTHROSCOPY KNEE WITH LATERAL MENISCECTOMY Left 10/22/2014    Procedure: ARTHROSCOPY KNEE WITH LATERAL MENISCECTOMY;   Surgeon: Alok Zapien MD;  Location: [facility]    COLONOSCOPY  2020    5 year fu, due 2025    KNEE LIGAMENT RECONSTRUCTION Right     [facility]    [facility] STOMACH SURGERY PROCEDURE UNLISTED      appe 16 yrs old    [facility] TOTAL KNEE ARTHROPLASTY      reconstuction       ALLERGIES:  No Known Allergies    CURRENT MEDICATIONS:  Current Outpatient Medications   Medication Sig Dispense Refill    atenolol (TENORMIN) 50 MG tablet Take 1 tablet (50 mg) by mouth 2 times daily 180 tablet 3    atorvastatin (LIPITOR) 10 MG tablet Take 1 tablet (10 mg) by mouth daily 90 tablet 3    diclofenac (VOLTAREN) 1 % GEL Place onto the skin 4 times daily Apply to left knee 4 times daily as needed, wash hands after application. 1 Tube 1    digoxin (LANOXIN) 250 MCG tablet Take 1 tablet (250 mcg) by mouth daily 90 tablet 4    enalapril (VASOTEC) 5 MG tablet Take 1 tablet (5 mg) by mouth 2 times daily 180 tablet 3    VITAMIN D, CHOLECALCIFEROL, PO Take by mouth daily      zinc gluconate 50 MG tablet Take 50 mg by mouth daily         SOCIAL HISTORY:  Social History     Socioeconomic History    Marital status:      Spouse name: Not on file    Number of children: Not on file    Years of education: Not on file    Highest education level: Not on file   Occupational History    Not on file   Tobacco Use    Smoking status: Never    Smokeless tobacco: Never   Vaping Use    Vaping Use: Never used   Substance and Sexual Activity    Alcohol use: Yes     Alcohol/week: 4.0 standard drinks of alcohol     Types: 4 Standard drinks or equivalent per week     Comment: weekends    Drug use: No    Sexual activity: Yes     Partners: Female   Other Topics Concern    Not on file   Social History Narrative    Not on file     Social Determinants of Health     Financial Resource Strain: Low Risk  (3/18/2024)    Financial Resource Strain     Within the past 12 months, have you or your family members you live with been unable to get utilities (heat, electricity) when  "it was really needed?: No   Food Insecurity: Low Risk  (3/18/2024)    Food Insecurity     Within the past 12 months, did you worry that your food would run out before you got money to buy more?: No     Within the past 12 months, did the food you bought just not last and you didn t have money to get more?: No   Transportation Needs: Low Risk  (3/18/2024)    Transportation Needs     Within the past 12 months, has lack of transportation kept you from medical appointments, getting your medicines, non-medical meetings or appointments, work, or from getting things that you need?: No   Physical Activity: Not on file   Stress: Not on file   Social Connections: Not on file   Interpersonal Safety: Not on file   Housing Stability: Low Risk  (3/18/2024)    Housing Stability     Do you have housing? : Yes     Are you worried about losing your housing?: No       FAMILY HISTORY:  Family History   Problem Relation Age of Onset    Diabetes Mother     Hypertension Mother     Cancer Mother     Arrhythmia No family hx of     Anesthesia Reaction No family hx of     Bleeding Diathesis No family hx of        REVIEW OF SYSTEMS:    Unremarkable other than chief complaint      PHYSICAL EXAM:   /80   Pulse 75   Resp 17   Ht 1.803 m (5' 11\")   Wt 146.1 kg (322 lb)   SpO2 95%   BMI 44.91 kg/m   Body mass index is 44.91 kg/m .    General Appearance: Pleasant, alert, appropriate appearance for age. No acute distress.  Sits with right-sided antalgia.  Stands with no antalgia.  Sit to stand transition is guarded.  He is able to lie supine and prone on my examination table with some mild difficulties turning.  He has absent patellar reflexes.  Motor strength is mildly impaired on the right lower extremity.  Negative straight leg raise test to 45 degrees bilaterally.  Review of prior imaging demonstrates retrolisthesis of L5 vertebrae. New radiographic images were performed.       Study Result    Narrative & Impression   PROCEDURE: XR " LUMBAR SPINE G/E 4 VIEWS 3/18/2024 1:59 PM     HISTORY: Chronic midline low back pain without sciatica; Chronic  midline low back pain without sciatica; Segmental dysfunction of  lumbar region; Segmental dysfunction of pelvic region     COMPARISONS: None.     TECHNIQUE: 5 views     FINDINGS: There is nonspondylolytic spondylolisthesis of L4 on L5.  Facet joint degenerative changes are noted at L3-L4, L4-L5 and L5-S1.     There is decreased disc space height noted at T12-L1, L1-L2, L2-L3.  The L3-L4 disc appears normal.     There is mild lumbar scoliosis.     The paravertebral soft tissues are normal.                                                                        IMPRESSION: Nonspondylolytic spondylolisthesis of L4 on L5.     MONA LARA MD             IMPRESSION/PLAN:    Worsening of spondylolisthesis from 2021 imaging.  Also marked worsening of pelvic unleveling as well as facet degeneration.  Recommend treating chiropractor to obtain imaging prior to treatment.    Start spine therapy program with Dominic PINEDA and will discuss case.  Referral placed.     Total time spent today in chart review/preparation, face to face evaluation, and documentation: 57 minutes.        Bebo Tang DC, DABFP, CICE  Director - Occupational Medicine Department  Diplomate of the American Board of Forensic Professionals  Board Certified - American Board of Independent Medical Examiners    2:37 PM 3/18/2024

## 2024-04-01 ENCOUNTER — OFFICE VISIT (OUTPATIENT)
Dept: FAMILY MEDICINE | Facility: OTHER | Age: 59
End: 2024-04-01
Attending: CHIROPRACTOR
Payer: COMMERCIAL

## 2024-04-01 VITALS
OXYGEN SATURATION: 93 % | WEIGHT: 315 LBS | RESPIRATION RATE: 20 BRPM | HEART RATE: 84 BPM | DIASTOLIC BLOOD PRESSURE: 90 MMHG | BODY MASS INDEX: 45.1 KG/M2 | HEIGHT: 70 IN | SYSTOLIC BLOOD PRESSURE: 141 MMHG

## 2024-04-01 DIAGNOSIS — M99.03 SEGMENTAL DYSFUNCTION OF LUMBAR REGION: ICD-10-CM

## 2024-04-01 DIAGNOSIS — M54.50 CHRONIC MIDLINE LOW BACK PAIN WITHOUT SCIATICA: Primary | ICD-10-CM

## 2024-04-01 DIAGNOSIS — G89.29 CHRONIC MIDLINE LOW BACK PAIN WITHOUT SCIATICA: Primary | ICD-10-CM

## 2024-04-01 DIAGNOSIS — M99.05 SEGMENTAL DYSFUNCTION OF PELVIC REGION: ICD-10-CM

## 2024-04-01 PROCEDURE — 99207 PR BACK PROGRAM: CPT | Performed by: CHIROPRACTOR

## 2024-04-01 ASSESSMENT — PAIN SCALES - GENERAL: PAINLEVEL: MODERATE PAIN (4)

## 2024-04-01 ASSESSMENT — PATIENT HEALTH QUESTIONNAIRE - PHQ9
SUM OF ALL RESPONSES TO PHQ QUESTIONS 1-9: 0
10. IF YOU CHECKED OFF ANY PROBLEMS, HOW DIFFICULT HAVE THESE PROBLEMS MADE IT FOR YOU TO DO YOUR WORK, TAKE CARE OF THINGS AT HOME, OR GET ALONG WITH OTHER PEOPLE: NOT DIFFICULT AT ALL
SUM OF ALL RESPONSES TO PHQ QUESTIONS 1-9: 0

## 2024-04-06 DIAGNOSIS — I48.20 CHRONIC ATRIAL FIBRILLATION (H): ICD-10-CM

## 2024-04-06 DIAGNOSIS — I10 ESSENTIAL HYPERTENSION, BENIGN: ICD-10-CM

## 2024-04-10 RX ORDER — ATENOLOL 50 MG/1
50 TABLET ORAL 2 TIMES DAILY
Qty: 180 TABLET | Refills: 3 | Status: SHIPPED | OUTPATIENT
Start: 2024-04-10

## 2024-04-10 RX ORDER — ENALAPRIL MALEATE 5 MG/1
5 TABLET ORAL 2 TIMES DAILY
Qty: 180 TABLET | Refills: 3 | Status: SHIPPED | OUTPATIENT
Start: 2024-04-10

## 2024-04-10 NOTE — TELEPHONE ENCOUNTER
[id number] in [employer] [facility] [place] sent Rx request for the following:      Requested Prescriptions   Pending Prescriptions Disp Refills    enalapril (VASOTEC) 5 MG tablet [Pharmacy Med Name: ENALAPRIL MALEATE 5 MG TABLET] 180 tablet 3     Sig: TAKE 1 TABLET BY MOUTH TWICE A DAY       ACE Inhibitors (Including Combos) Protocol Failed - 4/10/2024  2:28 PM        Failed - Blood pressure under 140/90 in past 12 months- Clinicial or Patient Reported     BP Readings from Last 3 Encounters:   04/01/24 (!) 141/90   03/18/24 136/80   09/01/23 118/78       No data recorded            Last Prescription Date:   4/21/23  Last Fill Qty/Refills:         180, R-3             atenolol (TENORMIN) 50 MG tablet [Pharmacy Med Name: ATENOLOL 50 MG TABLET] 180 tablet 3     Sig: TAKE 1 TABLET BY MOUTH TWICE A DAY       Beta-Blockers Protocol Failed - 4/10/2024  2:28 PM        Failed - Blood pressure under 140/90 in past 12 months     BP Readings from Last 3 Encounters:   04/01/24 (!) 141/90   03/18/24 136/80   09/01/23 118/78       No data recorded           Last Prescription Date:   4/21/23  Last Fill Qty/Refills:         180, R-3    Last Office Visit:              7/13/23   Future Office visit:           none    Routing refill request to provider for review/approval because:  Drug not on the [facility] refill protocol     Dee Dee Santoyo RN on 4/10/2024 at 2:31 PM

## 2024-05-07 ENCOUNTER — THERAPY VISIT (OUTPATIENT)
Dept: PHYSICAL THERAPY | Facility: OTHER | Age: 59
End: 2024-05-07
Attending: CHIROPRACTOR
Payer: COMMERCIAL

## 2024-05-07 DIAGNOSIS — M99.03 SEGMENTAL DYSFUNCTION OF LUMBAR REGION: ICD-10-CM

## 2024-05-07 DIAGNOSIS — G89.29 CHRONIC MIDLINE LOW BACK PAIN WITHOUT SCIATICA: Primary | ICD-10-CM

## 2024-05-07 DIAGNOSIS — M54.50 CHRONIC MIDLINE LOW BACK PAIN WITHOUT SCIATICA: Primary | ICD-10-CM

## 2024-05-07 DIAGNOSIS — M99.05 SOMATIC DYSFUNCTION OF PELVIS REGION: ICD-10-CM

## 2024-05-07 PROCEDURE — 97110 THERAPEUTIC EXERCISES: CPT | Mod: GP,PO

## 2024-05-07 PROCEDURE — 97161 PT EVAL LOW COMPLEX 20 MIN: CPT | Mod: GP,PO

## 2024-05-07 NOTE — PROGRESS NOTES
PHYSICAL THERAPY EVALUATION  Type of Visit: Evaluation    See electronic medical record for Abuse and Falls Screening details.    Subjective       Presenting condition or subjective complaint: Patient is a 58 year old male referred to physical therapy  for his low back pain. Notes that he has been dealing with this for quite some time. Notes the most recent bout of pain got pretty bad. He had a hard time lifting his right leg and doing any sort of activity. He notes that it was painful and difficult to do any walking. He had been going to the chiropractor about 1x every two weeks and this has tremendously helped. He doesn't have the pain in the right leg as much and he can lift that now. [place] [employer] this [place] and this did ok. He had more fatigue in the legs vs pain. Now most of his discomfort is in the left lower back near his SI joint. He has been doing a good stretching program and this has helped. He does this early in the morning before starts his day. Doesn't get any pain with sitting. 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 Denies numbness and tingling. No burning with prolonged standing  Date of onset: 03/18/24    Relevant medical history: High blood pressure; Heart problems; Arthritis   Dates & types of surgery: Right knee reconstruction in 2014, left knee scope 2017, Left knee Replacement: 7/19/23    Prior diagnostic imaging/testing results: X-ray     Prior therapy history for the same diagnosis, illness or injury: No Has gone to chiropractor.    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    Patient goals for therapy: Ambulation, prolonged activity    Pain assessment: Location: Low back and LE's /Rating: Best; 2/10, Worst: 9/10     Objective   LUMBAR SPINE EVALUATION  PAIN: Pain Quality: Aching and Sharp  Pain Frequency: constant  Pain is Exacerbated By: Walking, prolonged standing  Pain is Relieved By: otc medications and rest  INTEGUMENTARY (edema, incisions): WNL  POSTURE:  Seated posture: slight protraction of shoulders  GAIT:   Weightbearing Status: WBAT  Assistive Device(s): None  Gait Deviations:  Slower gait speed. No major deviations on this date.   BALANCE/PROPRIOCEPTION: WNL  WEIGHTBEARING ALIGNMENT: WNL  NON-WEIGHTBEARING ALIGNMENT: WNL   ROM:  Lumbar flexion: able to reach mid shin with fingertips, Extension: min limited secondary to pain, Rotation: Min limited bilaterally with \"tightness\",  Lateral flexion: Right: able to reach to lateral knee joint line with fingertips, Left: able to reach 1 inch short of lateral knee joint line.   PELVIC/SI SCREEN: WFL. Does have SI joint tenderness on both sides but primarily left today.   STRENGTH: WFL  DERMATOMES: WNL  NEURAL TENSION:  Tension noted with sciatic nerve tensioning  FLEXIBILITY: Generally stiff due to back and leg pain.   PALPATION: Discomfort noted with palpation to lumbar paraspinals, left QL on this date, gluteus medius and piriformis on left side. Patient notes that his chiropractor works on the piriformis and this has helped with his pain.     Assessment & Plan   CLINICAL IMPRESSIONS  Medical Diagnosis: Chronic midline low back pain without sciatica (M54.50, G89.29), Segmental dysfunction of lumbar region (M99.03), Segmental dysfunction of pelvic region (M99.05)    Treatment Diagnosis: Impaired mobility, decreased strength and endurance, chronic low back pain.   Impression/Assessment: Patient is a 58 year old male with low back and leg complaints.  The following significant findings have been identified: " Pain, Decreased ROM/flexibility, Decreased joint mobility, Decreased strength, Impaired muscle performance, and Decreased activity tolerance. These impairments interfere with their ability to perform self care tasks, recreational activities, household chores, household mobility, and community mobility as compared to previous level of function.     Clinical Decision Making (Complexity):  Clinical Presentation: Stable/Uncomplicated  Clinical Presentation Rationale: based on medical and personal factors listed in PT evaluation  Clinical Decision Making (Complexity): Low complexity    PLAN OF CARE  Treatment Interventions:  Modalities: Cryotherapy, E-stim, Hot Pack, Mechanical Traction, Ultrasound, Vasoneumatic Device  Interventions: Manual Therapy, Neuromuscular Re-education, Therapeutic Activity, Therapeutic Exercise, Aquatic Therapy    Long Term Goals     PT Goal 1  Goal Identifier: Ambulation  Goal Description: Patient will be able to complete ambulation for up to 10 minutes on even and uneven ground with no increase in back or leg pain in order to improve his overall mobility in the community  Rationale: to maximize safety and independence within the community  Target Date: 07/02/24  PT Goal 2  Goal Identifier: Housework  Goal Description: Patient will be able to complete all indoor housework, such as dishes and standing doing laundry, with no increase in back or leg pain in order to improve his overall function at home  Rationale: to maximize safety and independence within the home  Target Date: 07/02/24  PT Goal 3  Goal Identifier: ADLs  Goal Description: Patient will be able to stand and brush his teeth with no increase in back or leg pain in order to improve his ability to complete ADL's  Rationale: to maximize safety and independence with performance of ADLs and functional tasks  Target Date: 07/02/24      Frequency of Treatment: 1-2 times per week  Duration of Treatment: 8 weeks    Education Assessment:    Learner/Method: Patient;No Barriers to Learning    Risks and benefits of evaluation/treatment have been explained.   Patient/Family/caregiver agrees with Plan of Care.     Evaluation Time:     PT Sara, Low Complexity Minutes (49086): 30     Signing Clinician: Dominic Olmedo PT

## 2024-05-09 ENCOUNTER — THERAPY VISIT (OUTPATIENT)
Dept: PHYSICAL THERAPY | Facility: OTHER | Age: 59
End: 2024-05-09
Attending: CHIROPRACTOR
Payer: COMMERCIAL

## 2024-05-09 DIAGNOSIS — M54.50 CHRONIC MIDLINE LOW BACK PAIN WITHOUT SCIATICA: Primary | ICD-10-CM

## 2024-05-09 DIAGNOSIS — G89.29 CHRONIC MIDLINE LOW BACK PAIN WITHOUT SCIATICA: Primary | ICD-10-CM

## 2024-05-09 DIAGNOSIS — M99.03 SEGMENTAL DYSFUNCTION OF LUMBAR REGION: ICD-10-CM

## 2024-05-09 DIAGNOSIS — M99.05 SOMATIC DYSFUNCTION OF PELVIS REGION: ICD-10-CM

## 2024-05-09 PROCEDURE — 97140 MANUAL THERAPY 1/> REGIONS: CPT | Mod: GP,PO

## 2024-05-09 PROCEDURE — 97110 THERAPEUTIC EXERCISES: CPT | Mod: GP,PO

## 2024-05-14 ENCOUNTER — THERAPY VISIT (OUTPATIENT)
Dept: PHYSICAL THERAPY | Facility: OTHER | Age: 59
End: 2024-05-14
Attending: CHIROPRACTOR
Payer: COMMERCIAL

## 2024-05-14 DIAGNOSIS — M99.03 SEGMENTAL DYSFUNCTION OF LUMBAR REGION: ICD-10-CM

## 2024-05-14 DIAGNOSIS — G89.29 CHRONIC MIDLINE LOW BACK PAIN WITHOUT SCIATICA: Primary | ICD-10-CM

## 2024-05-14 DIAGNOSIS — M54.50 CHRONIC MIDLINE LOW BACK PAIN WITHOUT SCIATICA: Primary | ICD-10-CM

## 2024-05-14 DIAGNOSIS — M99.05 SOMATIC DYSFUNCTION OF PELVIS REGION: ICD-10-CM

## 2024-05-14 PROCEDURE — 97140 MANUAL THERAPY 1/> REGIONS: CPT | Mod: GP,PO

## 2024-05-14 PROCEDURE — 97110 THERAPEUTIC EXERCISES: CPT | Mod: GP,PO

## 2024-05-16 ENCOUNTER — THERAPY VISIT (OUTPATIENT)
Dept: PHYSICAL THERAPY | Facility: OTHER | Age: 59
End: 2024-05-16
Attending: CHIROPRACTOR
Payer: COMMERCIAL

## 2024-05-16 DIAGNOSIS — M99.05 SOMATIC DYSFUNCTION OF PELVIS REGION: ICD-10-CM

## 2024-05-16 DIAGNOSIS — M54.50 CHRONIC MIDLINE LOW BACK PAIN WITHOUT SCIATICA: Primary | ICD-10-CM

## 2024-05-16 DIAGNOSIS — G89.29 CHRONIC MIDLINE LOW BACK PAIN WITHOUT SCIATICA: Primary | ICD-10-CM

## 2024-05-16 DIAGNOSIS — M99.03 SEGMENTAL DYSFUNCTION OF LUMBAR REGION: ICD-10-CM

## 2024-05-16 PROCEDURE — 97140 MANUAL THERAPY 1/> REGIONS: CPT | Mod: GP,PO

## 2024-05-16 PROCEDURE — 97110 THERAPEUTIC EXERCISES: CPT | Mod: GP,PO

## 2024-05-21 ENCOUNTER — THERAPY VISIT (OUTPATIENT)
Dept: PHYSICAL THERAPY | Facility: OTHER | Age: 59
End: 2024-05-21
Attending: CHIROPRACTOR
Payer: COMMERCIAL

## 2024-05-21 DIAGNOSIS — M54.50 CHRONIC MIDLINE LOW BACK PAIN WITHOUT SCIATICA: Primary | ICD-10-CM

## 2024-05-21 DIAGNOSIS — G89.29 CHRONIC MIDLINE LOW BACK PAIN WITHOUT SCIATICA: Primary | ICD-10-CM

## 2024-05-21 DIAGNOSIS — M99.05 SOMATIC DYSFUNCTION OF PELVIS REGION: ICD-10-CM

## 2024-05-21 DIAGNOSIS — M99.03 SEGMENTAL DYSFUNCTION OF LUMBAR REGION: ICD-10-CM

## 2024-05-21 PROCEDURE — 97110 THERAPEUTIC EXERCISES: CPT | Mod: GP,PO

## 2024-05-21 PROCEDURE — 97140 MANUAL THERAPY 1/> REGIONS: CPT | Mod: GP,PO

## 2024-05-23 ENCOUNTER — THERAPY VISIT (OUTPATIENT)
Dept: PHYSICAL THERAPY | Facility: OTHER | Age: 59
End: 2024-05-23
Attending: CHIROPRACTOR
Payer: COMMERCIAL

## 2024-05-23 DIAGNOSIS — G89.29 CHRONIC MIDLINE LOW BACK PAIN WITHOUT SCIATICA: Primary | ICD-10-CM

## 2024-05-23 DIAGNOSIS — M99.03 SEGMENTAL DYSFUNCTION OF LUMBAR REGION: ICD-10-CM

## 2024-05-23 DIAGNOSIS — M99.05 SOMATIC DYSFUNCTION OF PELVIS REGION: ICD-10-CM

## 2024-05-23 DIAGNOSIS — M54.50 CHRONIC MIDLINE LOW BACK PAIN WITHOUT SCIATICA: Primary | ICD-10-CM

## 2024-05-23 PROCEDURE — 97110 THERAPEUTIC EXERCISES: CPT | Mod: GP,PO

## 2024-05-24 ENCOUNTER — OFFICE VISIT (OUTPATIENT)
Dept: PEDIATRICS | Facility: OTHER | Age: 59
End: 2024-05-24
Attending: INTERNAL MEDICINE
Payer: COMMERCIAL

## 2024-05-24 VITALS
WEIGHT: 315 LBS | SYSTOLIC BLOOD PRESSURE: 136 MMHG | HEART RATE: 68 BPM | RESPIRATION RATE: 16 BRPM | HEIGHT: 70 IN | TEMPERATURE: 97.2 F | OXYGEN SATURATION: 98 % | BODY MASS INDEX: 45.1 KG/M2 | DIASTOLIC BLOOD PRESSURE: 86 MMHG

## 2024-05-24 DIAGNOSIS — I87.8 VENOUS STASIS OF BOTH LOWER EXTREMITIES: ICD-10-CM

## 2024-05-24 DIAGNOSIS — I48.20 CHRONIC ATRIAL FIBRILLATION (H): ICD-10-CM

## 2024-05-24 DIAGNOSIS — Z96.652 STATUS POST LEFT KNEE REPLACEMENT: ICD-10-CM

## 2024-05-24 DIAGNOSIS — R73.09 ELEVATED GLUCOSE LEVEL: ICD-10-CM

## 2024-05-24 DIAGNOSIS — Z11.59 NEED FOR HEPATITIS C SCREENING TEST: ICD-10-CM

## 2024-05-24 DIAGNOSIS — E78.2 MIXED HYPERLIPIDEMIA: ICD-10-CM

## 2024-05-24 DIAGNOSIS — R58 VENOUS BLEED: ICD-10-CM

## 2024-05-24 DIAGNOSIS — Z11.4 SCREENING FOR HIV (HUMAN IMMUNODEFICIENCY VIRUS): ICD-10-CM

## 2024-05-24 DIAGNOSIS — R20.0 NUMBNESS AND TINGLING OF LEFT LEG: ICD-10-CM

## 2024-05-24 DIAGNOSIS — I10 ESSENTIAL HYPERTENSION, BENIGN: ICD-10-CM

## 2024-05-24 DIAGNOSIS — L40.9 PSORIASIS: ICD-10-CM

## 2024-05-24 DIAGNOSIS — R20.2 NUMBNESS AND TINGLING OF LEFT LEG: ICD-10-CM

## 2024-05-24 DIAGNOSIS — G47.33 OSA (OBSTRUCTIVE SLEEP APNEA): ICD-10-CM

## 2024-05-24 DIAGNOSIS — I83.92 SPIDER VEIN OF LEFT LOWER EXTREMITY: Primary | ICD-10-CM

## 2024-05-24 LAB
ANION GAP SERPL CALCULATED.3IONS-SCNC: 10 MMOL/L (ref 7–15)
BUN SERPL-MCNC: 19 MG/DL (ref 8–23)
CALCIUM SERPL-MCNC: 9.8 MG/DL (ref 8.8–10.2)
CHLORIDE SERPL-SCNC: 100 MMOL/L (ref 98–107)
CHOLEST SERPL-MCNC: 179 MG/DL
CREAT SERPL-MCNC: 1.12 MG/DL (ref 0.67–1.17)
DEPRECATED HCO3 PLAS-SCNC: 27 MMOL/L (ref 22–29)
DIGOXIN SERPL-MCNC: 0.8 NG/ML (ref 0.6–2)
EGFRCR SERPLBLD CKD-EPI 2021: 75 ML/MIN/1.73M2
FASTING STATUS PATIENT QL REPORTED: ABNORMAL
FASTING STATUS PATIENT QL REPORTED: ABNORMAL
GLUCOSE SERPL-MCNC: 110 MG/DL (ref 70–99)
HBA1C MFR BLD: 5.3 % (ref 4–6.2)
HDLC SERPL-MCNC: 39 MG/DL
LDLC SERPL CALC-MCNC: 108 MG/DL
NONHDLC SERPL-MCNC: 140 MG/DL
POTASSIUM SERPL-SCNC: 4.7 MMOL/L (ref 3.4–5.3)
SODIUM SERPL-SCNC: 137 MMOL/L (ref 135–145)
TRIGL SERPL-MCNC: 159 MG/DL

## 2024-05-24 PROCEDURE — 83036 HEMOGLOBIN GLYCOSYLATED A1C: CPT | Mod: ZL | Performed by: INTERNAL MEDICINE

## 2024-05-24 PROCEDURE — G2211 COMPLEX E/M VISIT ADD ON: HCPCS | Performed by: INTERNAL MEDICINE

## 2024-05-24 PROCEDURE — 80061 LIPID PANEL: CPT | Mod: ZL | Performed by: INTERNAL MEDICINE

## 2024-05-24 PROCEDURE — 80048 BASIC METABOLIC PNL TOTAL CA: CPT | Mod: ZL | Performed by: INTERNAL MEDICINE

## 2024-05-24 PROCEDURE — 86803 HEPATITIS C AB TEST: CPT | Mod: ZL | Performed by: INTERNAL MEDICINE

## 2024-05-24 PROCEDURE — 80162 ASSAY OF DIGOXIN TOTAL: CPT | Mod: ZL | Performed by: INTERNAL MEDICINE

## 2024-05-24 PROCEDURE — 36415 COLL VENOUS BLD VENIPUNCTURE: CPT | Mod: ZL | Performed by: INTERNAL MEDICINE

## 2024-05-24 PROCEDURE — 87389 HIV-1 AG W/HIV-1&-2 AB AG IA: CPT | Mod: ZL | Performed by: INTERNAL MEDICINE

## 2024-05-24 PROCEDURE — 99214 OFFICE O/P EST MOD 30 MIN: CPT | Performed by: INTERNAL MEDICINE

## 2024-05-24 RX ORDER — ACETAMINOPHEN 325 MG/1
650 TABLET ORAL
COMMUNITY

## 2024-05-24 RX ORDER — DIGOXIN 250 MCG
250 TABLET ORAL DAILY
Qty: 90 TABLET | Refills: 4 | Status: SHIPPED | OUTPATIENT
Start: 2024-05-24

## 2024-05-24 RX ORDER — TRIAMCINOLONE ACETONIDE 0.25 MG/G
OINTMENT TOPICAL 2 TIMES DAILY PRN
Qty: 80 G | Refills: 11 | Status: SHIPPED | OUTPATIENT
Start: 2024-05-24 | End: 2024-08-28

## 2024-05-24 ASSESSMENT — PAIN SCALES - GENERAL: PAINLEVEL: MODERATE PAIN (4)

## 2024-05-24 NOTE — PROGRESS NOTES
Assessment & Plan   1. Spider vein of left lower extremity  2. Venous bleed  This area appears to be consistent with a spider vein collection however differential diagnosis also includes other venous malformation, hemangioma, others.  If symptoms persist or worsen would recommend referral to vascular surgery.  I recommended compression stockings and the patient declined.    3. Psoriasis  Area appears most consistent with psoriasis.  Differential diagnosis also includes venous stasis dermatitis.  Discussed options and decided on a trial of triamcinolone  - triamcinolone (KENALOG) 0.025 % external ointment; Apply topically 2 times daily as needed for irritation Shortest duration is best  Dispense: 80 g; Refill: 11    4. Chronic atrial fibrillation (H)  At goal, no change.  Due for refill of digoxin.  Requesting digoxin level  - digoxin (LANOXIN) 250 MCG tablet; Take 1 tablet (250 mcg) by mouth daily  Dispense: 90 tablet; Refill: 4  - Digoxin; Future  - Digoxin    5. Venous stasis of both lower extremities  Commend compression stockings the patient    6. Elevated glucose level  - Hemoglobin A1c; Future  - Hemoglobin A1c    7. Mixed hyperlipidemia  - Lipid Profile; Future  - Lipid Profile    8. Essential hypertension, benign  - Basic metabolic panel; Future  - Basic metabolic panel    9. Screening for HIV (human immunodeficiency virus)  - HIV Screening; Future  - HIV Screening    10. Need for hepatitis C screening test  - Hepatitis C Screen Reflex to HCV RNA Quant and Genotype; Future  - Hepatitis C Screen Reflex to HCV RNA Quant and Genotype    11. AVNI (obstructive sleep apnea) - mild, not on CPAP    12. Numbness and tingling of left leg  13. Status post left knee replacement  Leave this is an area of neuropathy where the nerves did not regrow secondary to his knee replacement.  It has not been quite a year since his surgery so they could continue to grow.  Recommend trial of capsaicin cream.          Patient  "Instructions    -- Apply triamcinolone to itchy rash area   -- Shortest duration is best   -- Cover with topical emollient (eucerin, aveeno, vanicream)     -- Elevate feet above your hips for 20-30 minutes, 4 times per day   -- Compression stockings from morning to night   -- Work on 5-10% weight loss     -- Capsaicin cream for nerve pain      Return in about 3 months (around 8/24/2024), or if symptoms worsen or fail to improve, for annual visit, med management.    Signed, Howard Champagne MD, FAAP, FACP  Internal Medicine & Pediatrics    Subjective   Arie Hahn Jr. is a 58 year old male who presents for Derm Problem (Rash ).  Like to discuss multiple concerns.  He has 2 spots on his leg.  1 is a rash that is been present for years.  The other a tangle of blood vessels that have been present for years.  These blood vessels for the third time had blood shooting/spraying out of them after rubbing them with a washcloth.    Objective   Vitals: /86   Pulse 68   Temp 97.2  F (36.2  C) (Tympanic)   Resp 16   Ht 1.778 m (5' 10\")   Wt 143.3 kg (316 lb)   SpO2 98%   BMI 45.34 kg/m      Skin: Left anterior shin there is a slightly raised scaly slightly erythematous plaque.  Distal to this there is a prominence of superficial veins which are blanchable.    Review and Analysis of Data   I personally reviewed the following:  External notes: No  Results: Yes lab work reviewed including basic metabolic panel  Use of an independent historian: No  Independent review of a test performed by another physician: No  Discussion of management with another physician: No  Moderate risk of morbidity from additional diagnostic testing and/or treatment.    "

## 2024-05-24 NOTE — PATIENT INSTRUCTIONS
-- Apply triamcinolone to itchy rash area   -- Shortest duration is best   -- Cover with topical emollient (eucerin, aveeno, vanicream)     -- Elevate feet above your hips for 20-30 minutes, 4 times per day   -- Compression stockings from morning to night   -- Work on 5-10% weight loss     -- Capsaicin cream for nerve pain

## 2024-05-24 NOTE — NURSING NOTE
Chief Complaint   Patient presents with    Derm Problem     Rash        Medication Reconciliation: complete        Rosario Castle, RAYMUNDON

## 2024-05-25 LAB
HCV AB SERPL QL IA: NONREACTIVE
HIV 1+2 AB+HIV1 P24 AG SERPL QL IA: NONREACTIVE

## 2024-05-30 ENCOUNTER — THERAPY VISIT (OUTPATIENT)
Dept: PHYSICAL THERAPY | Facility: OTHER | Age: 59
End: 2024-05-30
Attending: CHIROPRACTOR
Payer: COMMERCIAL

## 2024-05-30 DIAGNOSIS — M99.05 SOMATIC DYSFUNCTION OF PELVIS REGION: ICD-10-CM

## 2024-05-30 DIAGNOSIS — M99.03 SEGMENTAL DYSFUNCTION OF LUMBAR REGION: ICD-10-CM

## 2024-05-30 DIAGNOSIS — G89.29 CHRONIC MIDLINE LOW BACK PAIN WITHOUT SCIATICA: Primary | ICD-10-CM

## 2024-05-30 DIAGNOSIS — M54.50 CHRONIC MIDLINE LOW BACK PAIN WITHOUT SCIATICA: Primary | ICD-10-CM

## 2024-05-30 PROCEDURE — 97110 THERAPEUTIC EXERCISES: CPT | Mod: GP,PO

## 2024-05-30 PROCEDURE — 97140 MANUAL THERAPY 1/> REGIONS: CPT | Mod: GP,PO

## 2024-06-04 ENCOUNTER — THERAPY VISIT (OUTPATIENT)
Dept: PHYSICAL THERAPY | Facility: OTHER | Age: 59
End: 2024-06-04
Attending: CHIROPRACTOR
Payer: COMMERCIAL

## 2024-06-04 DIAGNOSIS — G89.29 CHRONIC MIDLINE LOW BACK PAIN WITHOUT SCIATICA: Primary | ICD-10-CM

## 2024-06-04 DIAGNOSIS — M54.50 CHRONIC MIDLINE LOW BACK PAIN WITHOUT SCIATICA: Primary | ICD-10-CM

## 2024-06-04 DIAGNOSIS — M99.03 SEGMENTAL DYSFUNCTION OF LUMBAR REGION: ICD-10-CM

## 2024-06-04 DIAGNOSIS — M99.05 SOMATIC DYSFUNCTION OF PELVIS REGION: ICD-10-CM

## 2024-06-04 PROCEDURE — 97110 THERAPEUTIC EXERCISES: CPT | Mod: GP,PO

## 2024-06-04 PROCEDURE — 97140 MANUAL THERAPY 1/> REGIONS: CPT | Mod: GP,PO

## 2024-06-06 ENCOUNTER — THERAPY VISIT (OUTPATIENT)
Dept: PHYSICAL THERAPY | Facility: OTHER | Age: 59
End: 2024-06-06
Attending: CHIROPRACTOR
Payer: COMMERCIAL

## 2024-06-06 ENCOUNTER — OFFICE VISIT (OUTPATIENT)
Dept: FAMILY MEDICINE | Facility: OTHER | Age: 59
End: 2024-06-06
Attending: CHIROPRACTOR
Payer: COMMERCIAL

## 2024-06-06 VITALS
OXYGEN SATURATION: 97 % | RESPIRATION RATE: 18 BRPM | SYSTOLIC BLOOD PRESSURE: 138 MMHG | BODY MASS INDEX: 44.95 KG/M2 | HEART RATE: 57 BPM | DIASTOLIC BLOOD PRESSURE: 83 MMHG | TEMPERATURE: 97.1 F | WEIGHT: 314 LBS | HEIGHT: 70 IN

## 2024-06-06 DIAGNOSIS — M99.05 SOMATIC DYSFUNCTION OF PELVIS REGION: ICD-10-CM

## 2024-06-06 DIAGNOSIS — M99.03 SEGMENTAL DYSFUNCTION OF LUMBAR REGION: ICD-10-CM

## 2024-06-06 DIAGNOSIS — G89.29 CHRONIC MIDLINE LOW BACK PAIN WITHOUT SCIATICA: Primary | ICD-10-CM

## 2024-06-06 DIAGNOSIS — M54.50 CHRONIC MIDLINE LOW BACK PAIN WITHOUT SCIATICA: Primary | ICD-10-CM

## 2024-06-06 DIAGNOSIS — M99.05 SEGMENTAL DYSFUNCTION OF PELVIC REGION: ICD-10-CM

## 2024-06-06 PROCEDURE — 97140 MANUAL THERAPY 1/> REGIONS: CPT | Mod: GP,PO

## 2024-06-06 PROCEDURE — 97110 THERAPEUTIC EXERCISES: CPT | Mod: GP,PO

## 2024-06-06 PROCEDURE — 99207 PR BACK PROGRAM: CPT | Performed by: CHIROPRACTOR

## 2024-06-06 ASSESSMENT — PAIN SCALES - GENERAL: PAINLEVEL: MILD PAIN (3)

## 2024-06-06 NOTE — NURSING NOTE
Patient presents to the clinic for their 4 week follow up on the spine program. Pain is rated at a 3 in lower left back.

## 2024-06-06 NOTE — PROGRESS NOTES
CHIEF COMPLAINT:   Chief Complaint   Patient presents with    Follow Up     4wk Spine Program       HISTORY OF PRESENTING INJURY     Viktor returns today for his mid-treatment evaluation as part of his Spine Therapy Program.  He reports overall improvement of 80% today, with appreciation that he can now lift his left leg at the hip, unable to do so prior to starting with his therapist, Dominic.  He also notes improvement with ADL's, noting that he is now able to walk greater distances and stand and brush his teeth without intense pain causing him to need to walk around.  Viktor tried a massage at therapist's recommendations and this also produced improvement for approximately 3 days including work on his post-operative knee.    Oswestry (TIN) Questionnaire        6/6/2024    10:28 AM   OSWESTRY DISABILITY INDEX   Count 9   Sum 7   Oswestry Score (%) 15.56 %          PAST MEDICAL HISTORY:  Past Medical History:   Diagnosis Date    Atrial fibrillation (H)     lone    Chronic atrial fibrillation (H) 4/21/2023    Essential hypertension, benign 4/21/2023    Torn meniscus     left knee       PAST SURGICAL HISTORY:  Past Surgical History:   Procedure Laterality Date    ARTHROSCOPY KNEE WITH LATERAL MENISCECTOMY Left 10/22/2014    Procedure: ARTHROSCOPY KNEE WITH LATERAL MENISCECTOMY;  Surgeon: Alok Zapien MD;  Location: [facility] OR    COLONOSCOPY  2020    5 year fu, due 2025    KNEE LIGAMENT RECONSTRUCTION Right     [facility] STOMACH SURGERY PROCEDURE UNLISTED      appe 16 yrs old    [facility] TOTAL KNEE ARTHROPLASTY      reconstuction       ALLERGIES:  No Known Allergies    CURRENT MEDICATIONS:  Current Outpatient Medications   Medication Sig Dispense Refill    acetaminophen (TYLENOL) 325 MG tablet Take 650 mg by mouth      atenolol (TENORMIN) 50 MG tablet TAKE 1 TABLET BY MOUTH TWICE A  tablet 3    atorvastatin (LIPITOR) 10 MG tablet Take 1 tablet (10 mg) by mouth daily 90 tablet 3    diclofenac (VOLTAREN) 1 % GEL  Place onto the skin 4 times daily Apply to left knee 4 times daily as needed, wash hands after application. 1 Tube 1    digoxin (LANOXIN) 250 MCG tablet Take 1 tablet (250 mcg) by mouth daily 90 tablet 4    enalapril (VASOTEC) 5 MG tablet TAKE 1 TABLET BY MOUTH TWICE A  tablet 3    triamcinolone (KENALOG) 0.025 % external ointment Apply topically 2 times daily as needed for irritation Shortest duration is best 80 g 11    VITAMIN D, CHOLECALCIFEROL, PO Take by mouth daily      zinc gluconate 50 MG tablet Take 50 mg by mouth daily         SOCIAL HISTORY:  Social History     Socioeconomic History    Marital status:      Spouse name: Not on file    Number of children: Not on file    Years of education: Not on file    Highest education level: Not on file   Occupational History    Not on file   Tobacco Use    Smoking status: Never    Smokeless tobacco: Never   Vaping Use    Vaping status: Never Used   Substance and Sexual Activity    Alcohol use: Yes     Alcohol/week: 7.0 standard drinks of alcohol     Types: 7 Standard drinks or equivalent per week     Comment: weekends    Drug use: No    Sexual activity: Yes     Partners: Female   Other Topics Concern    Not on file   Social History Narrative    Not on file     Social Determinants of Health     Financial Resource Strain: Low Risk  (3/18/2024)    Financial Resource Strain     Within the past 12 months, have you or your family members you live with been unable to get utilities (heat, electricity) when it was really needed?: No   Food Insecurity: Low Risk  (3/18/2024)    Food Insecurity     Within the past 12 months, did you worry that your food would run out before you got money to buy more?: No     Within the past 12 months, did the food you bought just not last and you didn t have money to get more?: No   Transportation Needs: Low Risk  (3/18/2024)    Transportation Needs     Within the past 12 months, has lack of transportation kept you from medical  "appointments, getting your medicines, non-medical meetings or appointments, work, or from getting things that you need?: No   Physical Activity: Not on file   Stress: Not on file   Social Connections: Unknown (1/1/2022)    Received from [facility], [facility]    Social Connections     Frequency of Communication with Friends and Family: Not on file   Interpersonal Safety: Low Risk  (6/6/2024)    Interpersonal Safety     Do you feel physically and emotionally safe where you currently live?: Yes     Within the past 12 months, have you been hit, slapped, kicked or otherwise physically hurt by someone?: No     Within the past 12 months, have you been humiliated or emotionally abused in other ways by your partner or ex-partner?: No   Housing Stability: Low Risk  (3/18/2024)    Housing Stability     Do you have housing? : Yes     Are you worried about losing your housing?: No       FAMILY HISTORY:  Family History   Problem Relation Age of Onset    Diabetes Mother     Hypertension Mother     Cancer Mother     Arrhythmia No family hx of     Anesthesia Reaction No family hx of     Bleeding Diathesis No family hx of        REVIEW OF SYSTEMS:    Unremarkable other than chief complaint      PHYSICAL EXAM:   /83 (BP Location: Right arm, Patient Position: Sitting, Cuff Size: Adult Large)   Pulse 57   Temp 97.1  F (36.2  C) (Temporal)   Resp 18   Ht 1.778 m (5' 10\")   Wt 142.4 kg (314 lb)   SpO2 97%   BMI 45.05 kg/m   Body mass index is 45.05 kg/m . General Appearance: Pleasant, alert, appropriate appearance for age. No acute distress. Patient is ambulatory without assistance. Transitions without difficulty.  Stance and gait normal, no antalgia. Reviewed physical therapy encounter, exam findings consistent today.    IMPRESSION/PLAN:    Continue with our Spine Program.  He is doing well with this and consistent with his HEP.  Follow as initially " intended in 8 weeks or sooner if therapist finds him to achieve MMI sooner.  Also recommended stretching before bedtime now and he is agreeable to this.    Total time spent today in chart review/preparation, face to face evaluation, and documentation: 38 minutes.        Bebo Tang DC, DAB, CICE  Director - Occupational Medicine Department  Diplomate of the American Board of Forensic Professionals  Board Certified - American Board of Independent Medical Examiners    12:42 PM 6/6/2024

## 2024-06-11 ENCOUNTER — THERAPY VISIT (OUTPATIENT)
Dept: PHYSICAL THERAPY | Facility: OTHER | Age: 59
End: 2024-06-11
Attending: CHIROPRACTOR
Payer: COMMERCIAL

## 2024-06-11 DIAGNOSIS — M54.50 CHRONIC MIDLINE LOW BACK PAIN WITHOUT SCIATICA: Primary | ICD-10-CM

## 2024-06-11 DIAGNOSIS — M99.03 SEGMENTAL DYSFUNCTION OF LUMBAR REGION: ICD-10-CM

## 2024-06-11 DIAGNOSIS — G89.29 CHRONIC MIDLINE LOW BACK PAIN WITHOUT SCIATICA: Primary | ICD-10-CM

## 2024-06-11 DIAGNOSIS — M99.05 SOMATIC DYSFUNCTION OF PELVIS REGION: ICD-10-CM

## 2024-06-11 PROCEDURE — 97140 MANUAL THERAPY 1/> REGIONS: CPT | Mod: GP,PO

## 2024-06-11 PROCEDURE — 97110 THERAPEUTIC EXERCISES: CPT | Mod: GP,PO

## 2024-06-11 NOTE — PROGRESS NOTES
06/11/24 0500   Appointment Info   Signing clinician's name / credentials Dominic Olmedo DPT   Total/Authorized Visits 10   Visits Used 10/10   Medical Diagnosis Chronic midline low back pain without sciatica (M54.50, G89.29), Segmental dysfunction of lumbar region (M99.03), Segmental dysfunction of pelvic region (M99.05)   PT Tx Diagnosis Impaired mobility, decreased strength and endurance, chronic low back pain.   Other pertinent information Spine Therapy Program - with Dominic PINEDA   Progress Note/Certification   Onset of illness/injury or Date of Surgery 03/18/24   Therapy Frequency 1-2 times per week   Predicted Duration 8 weeks   Progress Note Due Date 07/02/24   Progress Note Completed Date 05/07/24       Present No   GOALS   PT Goals 2;3   PT Goal 1   Goal Identifier Ambulation   Goal Description Patient will be able to complete ambulation for up to 10 minutes on even and uneven ground with no increase in back or leg pain in order to improve his overall mobility in the community   Rationale to maximize safety and independence within the community   Goal Progress Progressing - patient has been doing really well in regards to this. he has been able to ambulate outdoors for this time but there are instances when his left side low back will still bother him.   Target Date 07/02/24   PT Goal 2   Goal Identifier Housework   Goal Description Patient will be able to complete all indoor housework, such as dishes and standing doing laundry, with no increase in back or leg pain in order to improve his overall function at home   Rationale to maximize safety and independence within the home   Goal Progress Progressing - patient still will wake up with pain until he gets stretched out and starts moving. It depends on when he attempts these tasks.   Target Date 07/02/24   PT Goal 3   Goal Identifier ADLs   Goal Description Patient will be able to stand and brush his teeth with no increase in back or  leg pain in order to improve his ability to complete ADL's   Rationale to maximize safety and independence with performance of ADLs and functional tasks   Goal Progress GOAL MET   Target Date 07/02/24   Date Met 06/11/24   Subjective Report   Subjective Report Patient had his follow up with Dr. Tang and notes that this went well. He told Dr Tang at that appointment he feels 80% improved but does know those values are very subjective and change day to day. Overall, he has been pleased with his progress. He notes that he can now lift up his right LE with no issues at all. He can also cut his backyard without needing to take a break due to pain. Before he was only able to take 2-4 passes before needing to sit down. Pleased with his progress to this point in time.   Objective Measures   Objective Measures Objective Measure 1   Objective Measure 1   Objective Measure LE strength   Details 5/5 bilaterally   Treatment Interventions (PT)   Interventions Therapeutic Procedure/Exercise;Manual Therapy   Therapeutic Procedure/Exercise   Therapeutic Procedures: strength, endurance, ROM, flexibility minutes (16452) 30   Ther Proc 1 ROm/strengthening   Ther Proc 1 - Details Scifit for 5 minutes on level 6, calf stretch: 2x30 seconds, TRX lean back and sidebend stretch: x2 each direction with 5 second holds, lumbar flexion stretch leaning forward on Plink back extension machine; 2x30 seconds, standing pelvic clocks with arms on wall: x10 CW circles, x10 CCW circles, lumbar side glides against wall: x10 each direction, supine QL stretcH: 2x30 seconds, walking lunges with lumbar twist; x30 feet, reviewed HEP   Skilled Intervention VC/demo   Patient Response/Progress Reports feeling better leaving the session compared to when he came in.   Manual Therapy   Manual Therapy: Mobilization, MFR, MLD, friction massage minutes (64908) 10   Manual Therapy 1 - Details STM/MFR and massage gun to left lumbar paraspinals, QL, Left glute med  and left piriformis with patient in sidelying.   Skilled Intervention STM   Patient Response/Progress Tolerated well   Education   Learner/Method Patient;No Barriers to Learning   Plan   Home program Lumbar rotation: 2x10, twice daily, PPT; 2x10, twice daily, 90/90 sciatic nerve glides: 2x10, twice daily   Plan for next session Assess response to HEP, progress mobility and strength as able   Total Session Time   Timed Code Treatment Minutes 40   Total Treatment Time (sum of timed and untimed services) 40         PLAN  Continue therapy per current plan of care. Patient is progressing well in therapy. Notes that his largest improvements to this point have been his symptoms on the right side subsiding and his functional improvements. He can lift his right leg without any difficulty. He can now mow his back yard without having to stop and take a break. Before therapy, he could only complete 2-4 rows before needing to stop. Pleased with this. He would continue to benefit from skilled PT services in order to reduce his pain and improve his mobility, strength, and endurance.     Beginning/End Dates of Progress Note Reporting Period:  05/07/24 to 06/11/2024    Referring Provider:  Bebo Tang

## 2024-06-18 ENCOUNTER — THERAPY VISIT (OUTPATIENT)
Dept: PHYSICAL THERAPY | Facility: OTHER | Age: 59
End: 2024-06-18
Attending: CHIROPRACTOR
Payer: COMMERCIAL

## 2024-06-18 DIAGNOSIS — M54.50 CHRONIC MIDLINE LOW BACK PAIN WITHOUT SCIATICA: Primary | ICD-10-CM

## 2024-06-18 DIAGNOSIS — M99.03 SEGMENTAL DYSFUNCTION OF LUMBAR REGION: ICD-10-CM

## 2024-06-18 DIAGNOSIS — G89.29 CHRONIC MIDLINE LOW BACK PAIN WITHOUT SCIATICA: Primary | ICD-10-CM

## 2024-06-18 DIAGNOSIS — M99.05 SOMATIC DYSFUNCTION OF PELVIS REGION: ICD-10-CM

## 2024-06-18 PROCEDURE — 97140 MANUAL THERAPY 1/> REGIONS: CPT | Mod: GP,PO

## 2024-06-18 PROCEDURE — 97110 THERAPEUTIC EXERCISES: CPT | Mod: GP,PO

## 2024-06-25 ENCOUNTER — THERAPY VISIT (OUTPATIENT)
Dept: PHYSICAL THERAPY | Facility: OTHER | Age: 59
End: 2024-06-25
Attending: CHIROPRACTOR
Payer: COMMERCIAL

## 2024-06-25 DIAGNOSIS — M99.03 SEGMENTAL DYSFUNCTION OF LUMBAR REGION: ICD-10-CM

## 2024-06-25 DIAGNOSIS — M99.05 SOMATIC DYSFUNCTION OF PELVIS REGION: ICD-10-CM

## 2024-06-25 DIAGNOSIS — G89.29 CHRONIC MIDLINE LOW BACK PAIN WITHOUT SCIATICA: Primary | ICD-10-CM

## 2024-06-25 DIAGNOSIS — M54.50 CHRONIC MIDLINE LOW BACK PAIN WITHOUT SCIATICA: Primary | ICD-10-CM

## 2024-06-25 PROCEDURE — 97110 THERAPEUTIC EXERCISES: CPT | Mod: GP,PO

## 2024-06-25 PROCEDURE — 97140 MANUAL THERAPY 1/> REGIONS: CPT | Mod: GP,PO

## 2024-06-27 ENCOUNTER — THERAPY VISIT (OUTPATIENT)
Dept: PHYSICAL THERAPY | Facility: OTHER | Age: 59
End: 2024-06-27
Attending: CHIROPRACTOR
Payer: COMMERCIAL

## 2024-06-27 DIAGNOSIS — G89.29 CHRONIC MIDLINE LOW BACK PAIN WITHOUT SCIATICA: Primary | ICD-10-CM

## 2024-06-27 DIAGNOSIS — M99.03 SEGMENTAL DYSFUNCTION OF LUMBAR REGION: ICD-10-CM

## 2024-06-27 DIAGNOSIS — M99.05 SOMATIC DYSFUNCTION OF PELVIS REGION: ICD-10-CM

## 2024-06-27 DIAGNOSIS — M54.50 CHRONIC MIDLINE LOW BACK PAIN WITHOUT SCIATICA: Primary | ICD-10-CM

## 2024-06-27 PROCEDURE — 97110 THERAPEUTIC EXERCISES: CPT | Mod: GP,PO

## 2024-06-27 PROCEDURE — 97140 MANUAL THERAPY 1/> REGIONS: CPT | Mod: GP,PO

## 2024-07-01 ENCOUNTER — THERAPY VISIT (OUTPATIENT)
Dept: PHYSICAL THERAPY | Facility: OTHER | Age: 59
End: 2024-07-01
Attending: CHIROPRACTOR
Payer: COMMERCIAL

## 2024-07-01 DIAGNOSIS — M99.05 SOMATIC DYSFUNCTION OF PELVIS REGION: ICD-10-CM

## 2024-07-01 DIAGNOSIS — G89.29 CHRONIC MIDLINE LOW BACK PAIN WITHOUT SCIATICA: Primary | ICD-10-CM

## 2024-07-01 DIAGNOSIS — M54.50 CHRONIC MIDLINE LOW BACK PAIN WITHOUT SCIATICA: Primary | ICD-10-CM

## 2024-07-01 DIAGNOSIS — M99.03 SEGMENTAL DYSFUNCTION OF LUMBAR REGION: ICD-10-CM

## 2024-07-01 PROCEDURE — 97110 THERAPEUTIC EXERCISES: CPT | Mod: GP,PO

## 2024-07-01 PROCEDURE — 97140 MANUAL THERAPY 1/> REGIONS: CPT | Mod: GP,PO

## 2024-07-08 ENCOUNTER — THERAPY VISIT (OUTPATIENT)
Dept: PHYSICAL THERAPY | Facility: OTHER | Age: 59
End: 2024-07-08
Attending: CHIROPRACTOR
Payer: COMMERCIAL

## 2024-07-08 DIAGNOSIS — G89.29 CHRONIC MIDLINE LOW BACK PAIN WITHOUT SCIATICA: Primary | ICD-10-CM

## 2024-07-08 DIAGNOSIS — M99.03 SEGMENTAL DYSFUNCTION OF LUMBAR REGION: ICD-10-CM

## 2024-07-08 DIAGNOSIS — M54.50 CHRONIC MIDLINE LOW BACK PAIN WITHOUT SCIATICA: Primary | ICD-10-CM

## 2024-07-08 DIAGNOSIS — M99.05 SOMATIC DYSFUNCTION OF PELVIS REGION: ICD-10-CM

## 2024-07-08 PROCEDURE — 97110 THERAPEUTIC EXERCISES: CPT | Mod: GP,PO

## 2024-07-08 PROCEDURE — 97140 MANUAL THERAPY 1/> REGIONS: CPT | Mod: GP,PO

## 2024-07-10 ENCOUNTER — THERAPY VISIT (OUTPATIENT)
Dept: PHYSICAL THERAPY | Facility: OTHER | Age: 59
End: 2024-07-10
Attending: CHIROPRACTOR
Payer: COMMERCIAL

## 2024-07-10 DIAGNOSIS — M99.05 SOMATIC DYSFUNCTION OF PELVIS REGION: ICD-10-CM

## 2024-07-10 DIAGNOSIS — M99.03 SEGMENTAL DYSFUNCTION OF LUMBAR REGION: ICD-10-CM

## 2024-07-10 DIAGNOSIS — M54.50 CHRONIC MIDLINE LOW BACK PAIN WITHOUT SCIATICA: Primary | ICD-10-CM

## 2024-07-10 DIAGNOSIS — G89.29 CHRONIC MIDLINE LOW BACK PAIN WITHOUT SCIATICA: Primary | ICD-10-CM

## 2024-07-10 PROCEDURE — 97110 THERAPEUTIC EXERCISES: CPT | Mod: GP,PO

## 2024-07-10 PROCEDURE — 97140 MANUAL THERAPY 1/> REGIONS: CPT | Mod: GP,PO

## 2024-07-17 ENCOUNTER — OFFICE VISIT (OUTPATIENT)
Dept: FAMILY MEDICINE | Facility: OTHER | Age: 59
End: 2024-07-17
Attending: CHIROPRACTOR
Payer: COMMERCIAL

## 2024-07-17 VITALS
DIASTOLIC BLOOD PRESSURE: 76 MMHG | WEIGHT: 315 LBS | OXYGEN SATURATION: 100 % | HEIGHT: 71 IN | HEART RATE: 74 BPM | RESPIRATION RATE: 16 BRPM | BODY MASS INDEX: 44.1 KG/M2 | SYSTOLIC BLOOD PRESSURE: 130 MMHG

## 2024-07-17 DIAGNOSIS — M99.05 SEGMENTAL DYSFUNCTION OF PELVIC REGION: ICD-10-CM

## 2024-07-17 DIAGNOSIS — G89.29 CHRONIC MIDLINE LOW BACK PAIN WITHOUT SCIATICA: Primary | ICD-10-CM

## 2024-07-17 DIAGNOSIS — M54.50 CHRONIC MIDLINE LOW BACK PAIN WITHOUT SCIATICA: Primary | ICD-10-CM

## 2024-07-17 PROCEDURE — 99214 OFFICE O/P EST MOD 30 MIN: CPT | Performed by: CHIROPRACTOR

## 2024-07-17 ASSESSMENT — PAIN SCALES - GENERAL: PAINLEVEL: MILD PAIN (2)

## 2024-07-17 NOTE — PROGRESS NOTES
CHIEF COMPLAINT:   Chief Complaint   Patient presents with    Follow Up       HISTORY OF PRESENTING INJURY     Viktor has completed his Spine Program with good results.  He does have lingering pain at the left SI joint.  No further physical therapy is scheduled.  Pain is mostly aggravated with standing.  He also notes painting the front side of his mobile home and this aggravated his lower back that night.  He describes this as dull and ache, with occasional sharp pain on motion.     Oswestry (TIN) Questionnaire        7/17/2024    12:42 PM   OSWESTRY DISABILITY INDEX   Count 6   Sum 5   Oswestry Score (%) 16.67 %        PAST MEDICAL HISTORY:  Past Medical History:   Diagnosis Date    Atrial fibrillation (H)     lone    Chronic atrial fibrillation (H) 4/21/2023    Essential hypertension, benign 4/21/2023    Torn meniscus     left knee       PAST SURGICAL HISTORY:  Past Surgical History:   Procedure Laterality Date    ARTHROSCOPY KNEE WITH LATERAL MENISCECTOMY Left 10/22/2014    Procedure: ARTHROSCOPY KNEE WITH LATERAL MENISCECTOMY;  Surgeon: Alok Zapein MD;  Location: [facility] OR    COLONOSCOPY  2020    5 year fu, due 2025    KNEE LIGAMENT RECONSTRUCTION Right     [facility]    [facility] STOMACH SURGERY PROCEDURE UNLISTED      appe 16 yrs old    [facility] TOTAL KNEE ARTHROPLASTY      reconstuction       ALLERGIES:  No Known Allergies    CURRENT MEDICATIONS:  Current Outpatient Medications   Medication Sig Dispense Refill    acetaminophen (TYLENOL) 325 MG tablet Take 650 mg by mouth      atenolol (TENORMIN) 50 MG tablet TAKE 1 TABLET BY MOUTH TWICE A  tablet 3    atorvastatin (LIPITOR) 10 MG tablet Take 1 tablet (10 mg) by mouth daily 90 tablet 3    diclofenac (VOLTAREN) 1 % GEL Place onto the skin 4 times daily Apply to left knee 4 times daily as needed, wash hands after application. 1 Tube 1    digoxin (LANOXIN) 250 MCG tablet Take 1 tablet (250 mcg) by mouth daily 90 tablet 4    enalapril (VASOTEC) 5 MG tablet TAKE 1  TABLET BY MOUTH TWICE A  tablet 3    triamcinolone (KENALOG) 0.025 % external ointment Apply topically 2 times daily as needed for irritation Shortest duration is best 80 g 11    VITAMIN D, CHOLECALCIFEROL, PO Take by mouth daily      zinc gluconate 50 MG tablet Take 50 mg by mouth daily         SOCIAL HISTORY:  Social History     Socioeconomic History    Marital status:      Spouse name: Not on file    Number of children: Not on file    Years of education: Not on file    Highest education level: Not on file   Occupational History    Not on file   Tobacco Use    Smoking status: Never    Smokeless tobacco: Never   Vaping Use    Vaping status: Never Used   Substance and Sexual Activity    Alcohol use: Yes     Alcohol/week: 7.0 standard drinks of alcohol     Types: 7 Standard drinks or equivalent per week     Comment: weekends    Drug use: No    Sexual activity: Yes     Partners: Female   Other Topics Concern    Not on file   Social History Narrative    Not on file     Social Determinants of Health     Financial Resource Strain: Low Risk  (3/18/2024)    Financial Resource Strain     Within the past 12 months, have you or your family members you live with been unable to get utilities (heat, electricity) when it was really needed?: No   Food Insecurity: Low Risk  (3/18/2024)    Food Insecurity     Within the past 12 months, did you worry that your food would run out before you got money to buy more?: No     Within the past 12 months, did the food you bought just not last and you didn t have money to get more?: No   Transportation Needs: Low Risk  (3/18/2024)    Transportation Needs     Within the past 12 months, has lack of transportation kept you from medical appointments, getting your medicines, non-medical meetings or appointments, work, or from getting things that you need?: No   Physical Activity: Not on file   Stress: Not on file   Social Connections: Unknown (1/1/2022)    [facility], [facility]    Social Connections     Frequency of Communication with Friends and Family: Not on file   Interpersonal Safety: Low Risk  (6/6/2024)    Interpersonal Safety     Do you feel physically and emotionally safe where you currently live?: Yes     Within the past 12 months, have you been hit, slapped, kicked or otherwise physically hurt by someone?: No     Within the past 12 months, have you been humiliated or emotionally abused in other ways by your partner or ex-partner?: No   Housing Stability: Low Risk  (3/18/2024)    Housing Stability     Do you have housing? : Yes     Are you worried about losing your housing?: No       FAMILY HISTORY:  Family History   Problem Relation Age of Onset    Diabetes Mother     Hypertension Mother     Cancer Mother     Arrhythmia No family hx of     Anesthesia Reaction No family hx of     Bleeding Diathesis No family hx of        REVIEW OF SYSTEMS:    Unremarkable other than chief complaint      PHYSICAL EXAM:   /76   Pulse 74   Resp 16   Ht 1.803 m (5' 11\")   Wt 142.9 kg (315 lb)   SpO2 100%   BMI 43.93 kg/m   Body mass index is 43.93 kg/m . General Appearance: NAD. Pain elicited with left SI joint palpation at piriformis and at mid SI joint.  No edema.  Normal sacral joint motion with p-a palpation pressure.         IMPRESSION/PLAN:    Discussion with him going forward and plan agreed to as ainsley:  Injection to be performed at Left SI joint with Dr. Soham So in [place], [place].  Continue HEP and follow up as needed in 6 months.    Total time spent today in chart review/preparation, face to face evaluation, and documentation: 33 minutes.        Bebo Tang DC, ROBERTOFP, CICE  Director - Occupational Medicine Department  Diplomate of the American Board of Forensic Professionals  Board Certified - American Board of Independent Medical Examiners    1:23 PM 7/17/2024    "

## 2024-07-26 ENCOUNTER — TELEPHONE (OUTPATIENT)
Dept: FAMILY MEDICINE | Facility: OTHER | Age: 59
End: 2024-07-26
Payer: COMMERCIAL

## 2024-07-26 NOTE — TELEPHONE ENCOUNTER
Pt called stating that he is needing to get an MRI done in order for him to get his back injection. He states that he has an appointment to get his injection done in September, but is needing an MRI before that. Please let patient know as soon as possible. Thanks!    Mi Brown on 7/26/2024 at 12:53 PM

## 2024-07-29 NOTE — TELEPHONE ENCOUNTER
Update:  Pt called, stated that he is scheduled for that injection Mid August, so needs to be scheduled asap.    Pt is requesting a call when the MRI order is placed. [phone]

## 2024-07-30 DIAGNOSIS — M54.50 CHRONIC MIDLINE LOW BACK PAIN WITHOUT SCIATICA: Primary | ICD-10-CM

## 2024-07-30 DIAGNOSIS — G89.29 CHRONIC MIDLINE LOW BACK PAIN WITHOUT SCIATICA: Primary | ICD-10-CM

## 2024-07-30 NOTE — TELEPHONE ENCOUNTER
Update: Pt called, stated that he is needing this referral and the appointment made before his injection appt on 08/13. He states that without the MRI he will not be able to get his injection done. Please call and speak with patient asap. Thanks    Mi Brown on 7/30/2024 at 1:09 PM

## 2024-07-31 ENCOUNTER — HOSPITAL ENCOUNTER (OUTPATIENT)
Dept: MRI IMAGING | Facility: OTHER | Age: 59
Discharge: HOME | End: 2024-07-31
Attending: CHIROPRACTOR | Admitting: CHIROPRACTOR
Payer: COMMERCIAL

## 2024-07-31 PROCEDURE — 72148 MRI LUMBAR SPINE W/O DYE: CPT

## 2024-08-28 ENCOUNTER — OFFICE VISIT (OUTPATIENT)
Dept: PEDIATRICS | Facility: OTHER | Age: 59
End: 2024-08-28
Attending: INTERNAL MEDICINE
Payer: COMMERCIAL

## 2024-08-28 VITALS
HEART RATE: 80 BPM | HEIGHT: 70 IN | OXYGEN SATURATION: 95 % | TEMPERATURE: 97.5 F | DIASTOLIC BLOOD PRESSURE: 70 MMHG | RESPIRATION RATE: 16 BRPM | WEIGHT: 306 LBS | SYSTOLIC BLOOD PRESSURE: 134 MMHG | BODY MASS INDEX: 43.81 KG/M2

## 2024-08-28 DIAGNOSIS — M46.1 SACROILIITIS (H): ICD-10-CM

## 2024-08-28 DIAGNOSIS — M47.816 LUMBAR FACET ARTHROPATHY: ICD-10-CM

## 2024-08-28 DIAGNOSIS — Z00.00 ROUTINE GENERAL MEDICAL EXAMINATION AT A HEALTH CARE FACILITY: Primary | ICD-10-CM

## 2024-08-28 DIAGNOSIS — E66.01 MORBID OBESITY DUE TO EXCESS CALORIES (H): ICD-10-CM

## 2024-08-28 DIAGNOSIS — E78.2 MIXED HYPERLIPIDEMIA: ICD-10-CM

## 2024-08-28 DIAGNOSIS — G47.33 OSA (OBSTRUCTIVE SLEEP APNEA): ICD-10-CM

## 2024-08-28 DIAGNOSIS — I10 ESSENTIAL HYPERTENSION, BENIGN: ICD-10-CM

## 2024-08-28 DIAGNOSIS — I48.20 CHRONIC ATRIAL FIBRILLATION (H): ICD-10-CM

## 2024-08-28 DIAGNOSIS — L40.9 PSORIASIS: ICD-10-CM

## 2024-08-28 DIAGNOSIS — M51.369 DDD (DEGENERATIVE DISC DISEASE), LUMBAR: ICD-10-CM

## 2024-08-28 DIAGNOSIS — M48.061 SPINAL STENOSIS OF LUMBAR REGION, UNSPECIFIED WHETHER NEUROGENIC CLAUDICATION PRESENT: ICD-10-CM

## 2024-08-28 DIAGNOSIS — Z96.652 STATUS POST TOTAL LEFT KNEE REPLACEMENT: ICD-10-CM

## 2024-08-28 DIAGNOSIS — M48.061 FORAMINAL STENOSIS OF LUMBAR REGION: ICD-10-CM

## 2024-08-28 PROCEDURE — 99396 PREV VISIT EST AGE 40-64: CPT | Performed by: INTERNAL MEDICINE

## 2024-08-28 PROCEDURE — 99214 OFFICE O/P EST MOD 30 MIN: CPT | Mod: 25 | Performed by: INTERNAL MEDICINE

## 2024-08-28 RX ORDER — CALCIUM CARBONATE/VITAMIN D3 600 MG-10
1000 TABLET ORAL DAILY
COMMUNITY

## 2024-08-28 RX ORDER — ATORVASTATIN CALCIUM 10 MG/1
10 TABLET, FILM COATED ORAL DAILY
Qty: 90 TABLET | Refills: 4 | Status: SHIPPED | OUTPATIENT
Start: 2024-08-28

## 2024-08-28 RX ORDER — HYDROCORTISONE VALERATE 2 MG/G
OINTMENT TOPICAL 2 TIMES DAILY
Qty: 60 G | Refills: 3 | Status: SHIPPED | OUTPATIENT
Start: 2024-08-28

## 2024-08-28 SDOH — HEALTH STABILITY: PHYSICAL HEALTH: ON AVERAGE, HOW MANY MINUTES DO YOU ENGAGE IN EXERCISE AT THIS LEVEL?: 30 MIN

## 2024-08-28 SDOH — HEALTH STABILITY: PHYSICAL HEALTH: ON AVERAGE, HOW MANY DAYS PER WEEK DO YOU ENGAGE IN MODERATE TO STRENUOUS EXERCISE (LIKE A BRISK WALK)?: 5 DAYS

## 2024-08-28 ASSESSMENT — SOCIAL DETERMINANTS OF HEALTH (SDOH): HOW OFTEN DO YOU GET TOGETHER WITH FRIENDS OR RELATIVES?: MORE THAN THREE TIMES A WEEK

## 2024-08-28 ASSESSMENT — PAIN SCALES - GENERAL: PAINLEVEL: NO PAIN (0)

## 2024-08-28 NOTE — NURSING NOTE
"Chief Complaint   Patient presents with    Physical     annual    Recheck Medication       Initial /70   Pulse 80   Temp 97.5  F (36.4  C) (Tympanic)   Resp 16   Ht 1.778 m (5' 10\")   Wt 138.8 kg (306 lb)   SpO2 95%   BMI 43.91 kg/m   Estimated body mass index is 43.91 kg/m  as calculated from the following:    Height as of this encounter: 1.778 m (5' 10\").    Weight as of this encounter: 138.8 kg (306 lb).  Medication Review: complete    The next two questions are to help us understand your food security.  If you are feeling you need any assistance in this area, we have resources available to support you today.          8/28/2024   SDOH- Food Insecurity   Within the past 12 months, did you worry that your food would run out before you got money to buy more? N   Within the past 12 months, did the food you bought just not last and you didn t have money to get more? N            Health Care Directive:  Patient does not have a Health Care Directive or Living Will: Discussed advance care planning with patient; however, patient declined at this time.    Norma J. Gosselin, LPN      "

## 2024-08-28 NOTE — PATIENT INSTRUCTIONS
-- Consult with Dr. Soham So for pain injection pending   -- Consider Neurosurgery consult     -- Start statin   -- If muscle/joint aches stop stain for a month or two, then retrial     -- Sleep study   -- Sleep consult with Dr. Avalos    The 10-year ASCVD risk score (Cathryn QUIROZ, et al., 2019) is: 11.7%    Values used to calculate the score:      Age: 58 years      Sex: Male      Is Non- : No      Diabetic: No      Tobacco smoker: No      Systolic Blood Pressure: 134 mmHg      Is BP treated: Yes      HDL Cholesterol: 39 mg/dL      Total Cholesterol: 179 mg/dL      Your BMI is Body mass index is 43.91 kg/m .  (BMI ranges: Normal 18.5 - 25, Overweight 25 - 30, Obesity greater than 30,     Morbid Obesity greater than 40 or greater than 35 with associated conditions.)    Facts about losing weight:   -- Overweight and Obesity increase your risk for developing diabetes, high blood pressure and stroke, and shorten your life.   -- 90% of weight loss comes from dietary changes, only 10% from exercise    What should I do?   -- Work on 5-10% weight loss   -- Focus on a few healthy dietary changes   -- Eat more fresh fruits and vegetables, and fewer carbohydrates   -- Cut out all calorie-containing beverages (milk, juice, alcohol, etc)   -- Exercise every day   -- Weigh yourself once a week   -- Learn about DASH Diet for dietary ways to reduce blood pressure  Google: [facility] DASH diet  NIH site: https://www.nhlbi.nih.gov/health-topics/dash-eating-plan  PDF from [facility]: https://www.nhlbi.nih.gov/files/docs/public/heart/new_dash.pdf   -- Consider Weight Watchers (http://www.weightwatchers.com)   -- Consider My Fitness Pal (iOS, Android, http://www.scriblepal.com)   -- Consider time-restricted eating (eg. eating between noon and 8pm only)          Patient Education   Preventive Care Advice   This is general advice given by our system to help you stay healthy. However, your care team may have specific  advice just for you. Please talk to your care team about your preventive care needs.  Nutrition  Eat 5 or more servings of fruits and vegetables each day.  Try wheat bread, brown rice and whole grain pasta (instead of white bread, rice, and pasta).  Get enough calcium and vitamin D. Check the label on foods and aim for 100% of the RDA (recommended daily allowance).  Lifestyle  Exercise at least 150 minutes each week  (30 minutes a day, 5 days a week).  Do muscle strengthening activities 2 days a week. These help control your weight and prevent disease.  No smoking.  Wear sunscreen to prevent skin cancer.  Have a dental exam and cleaning every 6 months.  Yearly exams  See your health care team every year to talk about:  Any changes in your health.  Any medicines your care team has prescribed.  Preventive care, family planning, and ways to prevent chronic diseases.  Shots (vaccines)   HPV shots (up to age 26), if you've never had them before.  Hepatitis B shots (up to age 59), if you've never had them before.  COVID-19 shot: Get this shot when it's due.  Flu shot: Get a flu shot every year.  Tetanus shot: Get a tetanus shot every 10 years.  Pneumococcal, hepatitis A, and RSV shots: Ask your care team if you need these based on your risk.  Shingles shot (for age 50 and up)  General health tests  Diabetes screening:  Starting at age 35, Get screened for diabetes at least every 3 years.  If you are younger than age 35, ask your care team if you should be screened for diabetes.  Cholesterol test: At age 39, start having a cholesterol test every 5 years, or more often if advised.  Bone density scan (DEXA): At age 50, ask your care team if you should have this scan for osteoporosis (brittle bones).  Hepatitis C: Get tested at least once in your life.  STIs (sexually transmitted infections)  Before age 24: Ask your care team if you should be screened for STIs.  After age 24: Get screened for STIs if you're at risk. You are  at risk for STIs (including HIV) if:  You are sexually active with more than one person.  You don't use condoms every time.  You or a partner was diagnosed with a sexually transmitted infection.  If you are at risk for HIV, ask about PrEP medicine to prevent HIV.  Get tested for HIV at least once in your life, whether you are at risk for HIV or not.  Cancer screening tests  Cervical cancer screening: If you have a cervix, begin getting regular cervical cancer screening tests starting at age 21.  Breast cancer scan (mammogram): If you've ever had breasts, begin having regular mammograms starting at age 40. This is a scan to check for breast cancer.  Colon cancer screening: It is important to start screening for colon cancer at age 45.  Have a colonoscopy test every 10 years (or more often if you're at risk) Or, ask your provider about stool tests like a FIT test every year or Cologuard test every 3 years.  To learn more about your testing options, visit:   .  For help making a decision, visit:   https://bit.ly/qn17412.  Prostate cancer screening test: If you have a prostate, ask your care team if a prostate cancer screening test (PSA) at age 55 is right for you.  Lung cancer screening: If you are a current or former smoker ages 50 to 80, ask your care team if ongoing lung cancer screenings are right for you.  For informational purposes only. Not to replace the advice of your health care provider. Copyright   2023 [facility]. All rights reserved. Clinically reviewed by the [facility] Program. Dapt 005844 - REV 01/24.  9 Ways to Cut Back on Drinking  Maybe you've found yourself drinking more alcohol than you'd prefer. If you want to cut back, here are some ideas to try.    Think before you drink.  Do you really want a drink, or is it just a habit? If you're used to having a drink at a certain time, try doing something else then.     Look for substitutes.  Find some no-alcohol  "drinks that you enjoy, like flavored seltzer water, tea with honey, or tonic with a slice of lime. Or try alcohol-free beer or \"virgin\" cocktails (without the alcohol).     Drink more water.  Use water to quench your thirst. Drink a glass of water before you have any alcohol. Have another glass along with every drink or between drinks.     Shrink your drink.  For example, have a bottle of beer instead of a pint. Use a smaller glass for wine. Choose drinks with lower alcohol content (ABV%). Or use less liquor and more mixer in cocktails.     Slow down.  It's easy to drink quickly and without thinking about it. Pay attention, and make each drink last longer.     Do the math.  Total up how much you spend on alcohol each month. How much is that a year? If you cut back, what could you do with the money you save?     Take a break.  Choose a day or two each week when you won't drink at all. Notice how you feel on those days, physically and emotionally. How did you sleep? Do you feel better? Over time, add more break days.     Count calories.  Would you like to lose some weight? For some people that's a good motivator for cutting back. Figure out how many calories are in each drink. How many does that add up to in a day? In a week? In a month?     Practice saying no.  Be ready when someone offers you a drink. Try: \"Thanks, I've had enough.\" Or \"Thanks, but I'm cutting back.\" Or \"No, thanks. I feel better when I drink less.\"   Current as of: November 15, 2023  Content Version: 14.1    5934-8594 [employer].   Care instructions adapted under license by your healthcare professional. If you have questions about a medical condition or this instruction, always ask your healthcare professional. [employer] disclaims any warranty or liability for your use of this information.     "

## 2024-08-28 NOTE — PROGRESS NOTES
Preventive Care Visit  [facility] AND \[facility]\""  Howard Champagne MD, Internal Medicine  Aug 28, 2024      1. Routine general medical examination at a health care facility  Cancer screening up-to-date.  Prostate cancer screening up-to-date.    2. Chronic atrial fibrillation (H)  [id number] score is 1.  We discussed anticoagulation and decided against.    3. Morbid obesity due to excess calories (H)  He has been working on lifestyle modification.  Applauded weight loss.  Discussed GLP-1 analog and patient declined.    4. AVNI (obstructive sleep apnea) - mild, not on CPAP  Recommend sleep study followed by sleep medicine consult.  - POLYSOMNOGRAPHY W/CPAP; Future  - Adult Sleep Evaluation & Management  Referral; Future    5. Essential hypertension, benign  At goal, no change.    6. DDD (degenerative disc disease), lumbar  7. Lumbar facet arthropathy  8. Spinal stenosis of lumbar region, unspecified whether neurogenic claudication present  9. Foraminal stenosis of lumbar region  10. Status post total left knee replacement  11. Sacroiliitis (H24)  Has gone through the spine program with Dr. Tang and had improvement in radiating pain but continues to have back pain.  Had found previous improvement with an injection and is scheduled to try another injection in the SI joint.  Considered neurosurgical consultation and the patient declines at this point.  Recommend continuing home exercise program.  Recommend weight loss.    12. Psoriasis  The area on his shin looks most consistent with psoriasis.  If not improving with the level 5 steroid cream.  We will go up to level 4 and request dermatology consult.  - hydrocortisone valerate (WEST-SINGH) 0.2 % external ointment; Apply topically 2 times daily.  Dispense: 60 g; Refill: 3  - Adult Dermatology  Referral; Future    13. Mixed hyperlipidemia  After a lengthy discussion we decided to retrial statin.  His 10-year risk score is greater than 10%  and statins are recommended for primary risk reduction.  - atorvastatin (LIPITOR) 10 MG tablet; Take 1 tablet (10 mg) by mouth daily.  Dispense: 90 tablet; Refill: 4      Patient Instructions    -- Consult with Dr. Soham So for pain injection pending   -- Consider Neurosurgery consult     -- Start statin   -- If muscle/joint aches stop stain for a month or two, then retrial     -- Sleep study   -- Sleep consult with Dr. Avalos    The 10-year ASCVD risk score (Cathryn QUIROZ, et al., 2019) is: 11.7%    Values used to calculate the score:      Age: 58 years      Sex: Male      Is Non- : No      Diabetic: No      Tobacco smoker: No      Systolic Blood Pressure: 134 mmHg      Is BP treated: Yes      HDL Cholesterol: 39 mg/dL      Total Cholesterol: 179 mg/dL      Your BMI is Body mass index is 43.91 kg/m .  (BMI ranges: Normal 18.5 - 25, Overweight 25 - 30, Obesity greater than 30,     Morbid Obesity greater than 40 or greater than 35 with associated conditions.)    Facts about losing weight:   -- Overweight and Obesity increase your risk for developing diabetes, high blood pressure and stroke, and shorten your life.   -- 90% of weight loss comes from dietary changes, only 10% from exercise    What should I do?   -- Work on 5-10% weight loss   -- Focus on a few healthy dietary changes   -- Eat more fresh fruits and vegetables, and fewer carbohydrates   -- Cut out all calorie-containing beverages (milk, juice, alcohol, etc)   -- Exercise every day   -- Weigh yourself once a week   -- Learn about DASH Diet for dietary ways to reduce blood pressure  Google: NIH DASH diet  NIH site: https://www.nhlbi.nih.gov/health-topics/dash-eating-plan  PDF from NIH: https://www.nhlbi.nih.gov/files/docs/public/heart/new_dash.pdf   -- Consider Weight Watchers (http://www.weightFiftyThreeers.com)   -- Consider My Fitness Pal (iOS, Android, http://[email or web address].com.Cont3nt.com)   -- Consider time-restricted eating (eg. eating  between noon and 8pm only)              Signed, Howard Champagne MD, FAAP, FACP  Internal Medicine & Pediatrics      Subjective   Viktor is a 58 year old, presenting for the following:  Physical (annual) and Recheck Medication         Health Care Directive  Patient does not have a Health Care Directive or Living Will: Discussed advance care planning with patient; however, patient declined at this time.    History of Present Illness       Hypertension: He presents for follow up of hypertension.  He does not check blood pressure  regularly outside of the clinic. Outpatient blood pressures have not been over 140/90. He follows a low salt diet.                  8/28/2024   General Health   How would you rate your overall physical health? Good   Feel stress (tense, anxious, or unable to sleep) Not at all            8/28/2024   Nutrition   Three or more servings of calcium each day? Yes   Diet: Regular (no restrictions)   How many servings of fruit and vegetables per day? (!) 2-3   How many sweetened beverages each day? 0-1            8/28/2024   Exercise   Days per week of moderate/strenous exercise 5 days   Average minutes spent exercising at this level 30 min            8/28/2024   Social Factors   Frequency of gathering with friends or relatives More than three times a week   Worry food won't last until get money to buy more No   Food not last or not have enough money for food? No   Do you have housing? (Housing is defined as stable permanent housing and does not include staying ouside in a car, in a tent, in an abandoned building, in an overnight shelter, or couch-surfing.) Yes   Are you worried about losing your housing? No   Lack of transportation? No   Unable to get utilities (heat,electricity)? No            8/28/2024   Fall Risk   Fallen 2 or more times in the past year? No   Trouble with walking or balance? No             8/28/2024   Dental   Dentist two times every year? (!) NO            8/28/2024   TB Screening    Were you born outside of the [place]? No                  8/28/2024   Substance Use   Alcohol more than 3/day or more than 7/wk Yes   How often do you have a drink containing alcohol 4 or more times a week   How many alcohol drinks on typical day 1 or 2   How often do you have 5+ drinks at one occasion Never   Audit 2/3 Score 0   How often not able to stop drinking once started Never   How often failed to do what normally expected Never   How often needed first drink in am after a heavy drinking session Never   How often feeling of guilt or remorse after drinking Never   How often unable to remember what happened the night before Never   Have you or someone else been injured because of your drinking No   Has anyone been concerned or suggested you cut down on drinking No   TOTAL SCORE - AUDIT 4   Do you use any other substances recreationally? No        Social History     Tobacco Use    Smoking status: Never    Smokeless tobacco: Never   Vaping Use    Vaping status: Never Used   Substance Use Topics    Alcohol use: Yes     Alcohol/week: 7.0 standard drinks of alcohol     Types: 7 Standard drinks or equivalent per week     Comment: weekends    Drug use: No           8/28/2024   STI Screening   New sexual partner(s) since last STI/HIV test? No      Last PSA:   Prostate Specific Antigen Screen   Date Value Ref Range Status   04/21/2023 1.60 0.00 - 3.50 ng/mL Final     ASCVD Risk   The 10-year ASCVD risk score (Cathryn QUIROZ, et al., 2019) is: 11.7%    Values used to calculate the score:      Age: 58 years      Sex: Male      Is Non- : No      Diabetic: No      Tobacco smoker: No      Systolic Blood Pressure: 134 mmHg      Is BP treated: Yes      HDL Cholesterol: 39 mg/dL      Total Cholesterol: 179 mg/dL           Reviewed and updated as needed this visit by Provider   Tobacco  Allergies  Meds  Problems  Med Hx  Surg Hx  Fam Hx                     Objective    Exam  /70   Pulse 80  "  Temp 97.5  F (36.4  C) (Tympanic)   Resp 16   Ht 1.778 m (5' 10\")   Wt 138.8 kg (306 lb)   SpO2 95%   BMI 43.91 kg/m     Estimated body mass index is 43.91 kg/m  as calculated from the following:    Height as of this encounter: 1.778 m (5' 10\").    Weight as of this encounter: 138.8 kg (306 lb).    Physical Exam  Gen: Alert, NAD.  Neck: No carotid bruits  CV: irregularly irregular no m/r/g  Pulm: CTAB, no w/r/r. No increased work of breathing  Msk: No LE edema  Neuro: Grossly intact  Skin: Left anterior shin slightly raised whitish to purple plaque.  Psychiatric: Normal affect and insight. Does not appear anxious or depressed.          Signed Electronically by: Howard Champagne MD    "

## 2024-09-05 ENCOUNTER — TRANSFERRED RECORDS (OUTPATIENT)
Dept: HEALTH INFORMATION MANAGEMENT | Facility: OTHER | Age: 59
End: 2024-09-05
Payer: COMMERCIAL

## 2024-09-06 ENCOUNTER — MYC MEDICAL ADVICE (OUTPATIENT)
Dept: PULMONOLOGY | Facility: OTHER | Age: 59
End: 2024-09-06

## 2024-09-11 NOTE — PROGRESS NOTES
09/11/24 1219   Reason For Your Visit   Please briefly describe the main reason(s) for your sleep visit Isaiah and  recommended   Approximately when did this problem start Age 27   What are your goals for this visit Find out if i have a sleep/ breathing problem   Time in Bed - Work Or School Days   Do you work or go to school Yes   What time do you usually get into bed  am & pm   About how long does it take you to fall asleep 10-15 minutes   How often do you have trouble falling asleep Not often   How often do you wake up during the night Once every night   Do you work days/evenings/nights/rotating shifts Days   What wakes you up at night Pain;Use the bathroom   How often do you have trouble falling back to sleep Most of the time unless I take sleep aid   About how long does it take to fall back to sleep If I have sleep aid right away   What do you usually do if you have trouble getting back to sleep Lay there and toss and turn   What time do you usually get out of bed to start your day 4:45am   Do you use an alarm Yes   Time in Bed - Weekends/Non-work Days/All Other Days   What time do you usually get into bed 10:00pm   About how long does it take you to fall asleep 10-15 minutes   What time do you usually get out of bed to start your day 7:00am   Do you use an alarm No   Sleep Need   On average, about how much sleep do you think you get 6 1/2 to 8 hours   About how much sleep do you think you need Same   Sleep Position   Which sleep positions do you prefer Stomach   Do you do any of the following activities in bed Watch TV   How often do you take a nap on purpose 5   About how long are your naps 1 hour   Do you feel better after naps Yes   How often do you doze off unintentionally Never   Have you ever had a driving accident or near-miss due to sleepiness/drowsiness No   Sleep Disruptions - Breathing/Snoring   Do you snore Yes   Do other people complain about your snoring Yes   Have you been  told you stop breathing in your sleep Yes   Do you have issues with any of the following Morning mouth dryness;Stuffy nose when you wake up   Sleep Disruptions - Movement   Do you get pain, discomfort, with an urge to move No   Does it happen when you are resting No   Does it get better if you move around No   Does it happen more at night No   Have you been told you kick your legs at night No   Sleep Disruptions - Behaviours in Sleep   Do you ever experience sudden muscle weakness during the day No   4) Is there anything else you would like your sleep provider to know I have back pain   Caffeine, Alcohol and Other Substances   How many caffeinated beverages (coffee, tea, soda, energy drinks) per day 1-2   What time of day is your last caffeine use Noon   List any prescribed or over the counter stimulants that you take None   List previous sleep medications you have tried Generic over-the-counter sleep aid   Do you drink alcohol to help you sleep No   Do you drink alcohol near bedtime Yes   Family History   Has any family member been diagnosed with a sleep disorder No   In the last TWO WEEKS have you experienced any of the following symptoms?   Pain at Night Yes   Dry Mouth in the Morning Yes   Joint Pains at Night Yes           9/11/2024    12:24 PM    [place] Sleepiness Scale ( JAM Kennedy  [id number]<br>ESS - [place]/English - Final version - 21 Nov 07 - [facility] [employer].)   Sitting and reading Slight chance of dozing   Watching TV High chance of dozing   Sitting, inactive in a public place (e.g. a theatre or a meeting) Would never doze   As a passenger in a car for an hour without a break Would never doze   Lying down to rest in the afternoon when circumstances permit High chance of dozing   Sitting and talking to someone Would never doze   Sitting quietly after a lunch without alcohol Moderate chance of dozing   In a car, while stopped for a few minutes in traffic Would never doze   [place] Score (MC) 9    [place] Score (Sleep) 9         9/11/2024    12:20 PM   Insomnia Severity Index (SADIA)   Difficulty falling asleep 0   Difficulty staying asleep 0   Problems waking up too early 3   How SATISFIED/DISSATISFIED are you with your CURRENT sleep pattern? 1   How NOTICEABLE to others do you think your sleep problem is in terms of impairing the quality of your life? 0   How WORRIED/DISTRESSED are you about your current sleep problem? 1   To what extent do you consider your sleep problem to INTERFERE with your daily functioning (e.g. daytime fatigue, mood, ability to function at work/daily chores, concentration, memory, mood, etc.) CURRENTLY? 0   SADIA Total Score 5         9/11/2024    12:22 PM   STOP BANG Questionnaire (  2008, the American Society of Anesthesiologists, Inc. [employer]   1. Snoring - Do you snore loudly (louder than talking or loud enough to be heard through closed doors)? Yes   2. Tired - Do you often feel tired, fatigued, or sleepy during daytime? Yes   3. Observed - Has anyone observed you stop breathing during your sleep? Yes   4. Blood pressure - Do you have or are you being treated for high blood pressure? No   5. BMI - BMI more than 35 kg/m2? Yes   6. Age - Age over 50 yr old? Yes   7. Neck circumference - Neck circumference greater than 40 cm? Yes   8. Gender - Gender male? Yes   STOP BANG Score (MC): 6 (High risk of AVNI)

## 2024-09-13 ENCOUNTER — TRANSFERRED RECORDS (OUTPATIENT)
Dept: HEALTH INFORMATION MANAGEMENT | Facility: OTHER | Age: 59
End: 2024-09-13

## 2024-09-13 NOTE — TELEPHONE ENCOUNTER
Chart review prior to sleep testing.    Patient Summary:  58 year old male who is referred for sleep-disordered breathing.    Patient Active Problem List    Diagnosis Date Noted    DDD (degenerative disc disease), lumbar 08/28/2024     Priority: Medium    Lumbar facet arthropathy 08/28/2024     Priority: Medium    Spinal stenosis of lumbar region, unspecified whether neurogenic claudication present 08/28/2024     Priority: Medium    Foraminal stenosis of lumbar region 08/28/2024     Priority: Medium    Sacroiliitis (H24) 08/28/2024     Priority: Medium    Numbness and tingling of left leg 05/24/2024     Priority: Medium    Chronic midline low back pain without sciatica 05/07/2024     Priority: Medium    Segmental dysfunction of lumbar region 05/07/2024     Priority: Medium    Somatic dysfunction of pelvis region 05/07/2024     Priority: Medium    Status post total left knee replacement 07/27/2023     Priority: Medium    Chronic atrial fibrillation (H) 04/21/2023     Priority: Medium    AVNI (obstructive sleep apnea) - mild, not on CPAP 04/21/2023     Priority: Medium    Essential hypertension, benign 04/21/2023     Priority: Medium    Morbid obesity due to excess calories (H) 11/20/2019     Priority: Medium       Current Outpatient Medications   Medication Sig Dispense Refill    acetaminophen (TYLENOL) 325 MG tablet Take 650 mg by mouth      atenolol (TENORMIN) 50 MG tablet TAKE 1 TABLET BY MOUTH TWICE A  tablet 3    atorvastatin (LIPITOR) 10 MG tablet Take 1 tablet (10 mg) by mouth daily. 90 tablet 4    diclofenac (VOLTAREN) 1 % GEL Place onto the skin 4 times daily Apply to left knee 4 times daily as needed, wash hands after application. 1 Tube 1    digoxin (LANOXIN) 250 MCG tablet Take 1 tablet (250 mcg) by mouth daily 90 tablet 4    enalapril (VASOTEC) 5 MG tablet TAKE 1 TABLET BY MOUTH TWICE A  tablet 3    hydrocortisone valerate (WEST-SINGH) 0.2 % external ointment Apply topically 2 times daily. 60  g 3    vitamin B-12 (CYANOCOBALAMIN) 250 MCG tablet Take 1,000 mcg by mouth daily.      VITAMIN D, CHOLECALCIFEROL, PO Take by mouth daily      zinc gluconate 50 MG tablet Take 50 mg by mouth daily       Current Facility-Administered Medications   Medication Dose Route Frequency Provider Last Rate Last Admin    lidocaine 1 % injection 3 mL  3 mL   Kee Hood MD   3 mL at 02/23/22 1639    methylPREDNISolone (DEPO-MEDROL) injection 40 mg  40 mg   Kee Hood MD   40 mg at 10/27/20 0840    sodium hyaluronate (EUFLEXXA) injection 20 mg  20 mg   Kee Hood MD   20 mg at 10/13/22 0750    sodium hyaluronate (EUFLEXXA) injection 20 mg  20 mg   Kee Hood MD   20 mg at 10/05/22 0707    sodium hyaluronate (EUFLEXXA) injection 20 mg  20 mg   Kee Hood MD   20 mg at 09/21/22 0757    sodium hyaluronate (EUFLEXXA) injection 20 mg  20 mg   Kee Hood MD   20 mg at 03/02/22 0937    sodium hyaluronate (EUFLEXXA) injection 20 mg  20 mg   Kee Hood MD   20 mg at 02/23/22 1639    sodium hyaluronate (EUFLEXXA) injection 20 mg  20 mg   Kee Hood MD   20 mg at 02/07/22 0839    sodium hyaluronate (EUFLEXXA) injection 20 mg  20 mg   Kee Hood MD   20 mg at 12/03/20 0712    sodium hyaluronate (EUFLEXXA) injection 20 mg  20 mg   Kee Hood MD   20 mg at 11/23/20 1326    sodium hyaluronate (EUFLEXXA) injection 20 mg  20 mg   Kee Hood MD   20 mg at 11/18/20 1536    triamcinolone (KENALOG-40) injection 40 mg  40 mg   Kee Hood MD   40 mg at 10/27/22 1046    triamcinolone (KENALOG-40) injection 40 mg  40 mg   Kee Hood MD   40 mg at 07/20/22 0811    triamcinolone (KENALOG-40) injection 40 mg  40 mg   Kee Hood MD   40 mg at 10/11/21 1225    triamcinolone (KENALOG-40) injection 40 mg  40 mg   Kee Hood MD   40 mg at 11/20/19 0824       Pertinent PMHx of obesity,  "atrial fibrillation, HTN, AVNI.    Prior Sleep Testin2017 - PSG at [facility] in [place], [place].  Weight 351 lbs, BMI 47.5.  AHI 12.2, RDI 16.  [place] SpO2 83%.  PLM index < 1.  EKG NSR.    STOP-BANG score of 6, with unknown neck circumference.  [place] score of 9.  SADIA: 5    BMI of Estimated body mass index is 43.91 kg/m  as calculated from the following:    Height as of 24: 1.778 m (5' 10\").    Weight as of 24: 138.8 kg (306 lb).     Chief concern per questionnaire: \"A-vib and Dr recommended \"    Duration of symptoms:  \"Age 27\"    Goals for visit per questionnaire: \"Find out if i have a sleep/ breathing problem \"    Sleep pattern:  Workdays.  ? - 4:45am.  Weekends.  10pm - 7am.  Time to fall asleep: ~10-15 minutes.  Awakenings: 1 times per night, few minutes to return to sleep with use of sleep aid  Average total sleep time:  6.5-8 hours  Napping.  5 days per week, 1 hours per nap.    Yes for OTC sleep aid.    No for RLS screen.  No for sleep walking.  No for dream enactment behavior.  No for bruxism.    No for morning headaches.  Yes for snoring.  Yes for observed apnea.  No for FHx of AVNI.    Caffeine use:  No for 3+ per day.  No for within 6 hours of bed.    A/P:    1.)  History of mild AVNI (2017 with AHI 12, [place] SpO2 83%, weight 351 lbs)    Interim weight loss of ~50 lbs.    Pertinent co-morbidities of HTN, atrial fibrillation.    I would recommend repeat testing.  Would appear to be candidate for either home sleep testing or in-lab PSG.    ---  This note was written with the assistance of the Dragon voice-dictation technology software. The final document, although reviewed, may contain errors. For corrections, please contact the office.    Eddie Avalos MD    Sleep Medicine  [facility]  - [place], [place]  Main Office: [phone]  [place] - [place], " [place]  [address], [address]  Schedule visits: [phone]  Main Office: [phone]  Fax: [phone]

## 2024-11-08 ENCOUNTER — TRANSFERRED RECORDS (OUTPATIENT)
Dept: HEALTH INFORMATION MANAGEMENT | Facility: OTHER | Age: 59
End: 2024-11-08
Payer: COMMERCIAL

## 2024-11-26 ENCOUNTER — OFFICE VISIT (OUTPATIENT)
Dept: FAMILY MEDICINE | Facility: OTHER | Age: 59
End: 2024-11-26
Attending: NURSE PRACTITIONER
Payer: COMMERCIAL

## 2024-11-26 VITALS
TEMPERATURE: 99.5 F | HEART RATE: 89 BPM | WEIGHT: 315 LBS | HEIGHT: 70 IN | OXYGEN SATURATION: 94 % | SYSTOLIC BLOOD PRESSURE: 150 MMHG | BODY MASS INDEX: 45.1 KG/M2 | RESPIRATION RATE: 16 BRPM | DIASTOLIC BLOOD PRESSURE: 84 MMHG

## 2024-11-26 DIAGNOSIS — J06.9 UPPER RESPIRATORY TRACT INFECTION, UNSPECIFIED TYPE: Primary | ICD-10-CM

## 2024-11-26 RX ORDER — AZITHROMYCIN 250 MG/1
TABLET, FILM COATED ORAL
Qty: 6 TABLET | Refills: 0 | Status: SHIPPED | OUTPATIENT
Start: 2024-11-26

## 2024-11-26 ASSESSMENT — ENCOUNTER SYMPTOMS
CARDIOVASCULAR NEGATIVE: 1
FATIGUE: 1
EYES NEGATIVE: 1
SINUS PAIN: 0
FEVER: 0
SINUS PRESSURE: 0
GASTROINTESTINAL NEGATIVE: 1
CHEST TIGHTNESS: 1
COUGH: 1
ENDOCRINE NEGATIVE: 1
SORE THROAT: 0
CHILLS: 0
SHORTNESS OF BREATH: 0

## 2024-11-26 ASSESSMENT — PAIN SCALES - GENERAL: PAINLEVEL_OUTOF10: NO PAIN (0)

## 2024-11-27 NOTE — PROGRESS NOTES
Arie Hahn Jr.  1965    ASSESSMENT/PLAN      1. Upper respiratory tract infection, unspecified type (Primary)  - azithromycin (ZITHROMAX) 250 MG tablet; Take 2 tablets (500mg) on day one, then take 1 tablet (250mg) for the next 4 days  Dispense: 6 tablet; Refill: 0      -No role in viral testing as he is outside of the window for most viruses.  -Since symptoms have been present for 8 days, provided patient with azithromycin for upper respiratory tract infection.  Side effects reviewed.  -Recommend Mucinex DM for cough and productivity.  - Symptomatic treatment - Encouraged fluids, salt water gargles, honey, humidifier, saline nasal spray, lozenges, tea, soup, smoothies, popsicles, topical vapor rub, rest, etc   - May use over-the-counter Tylenol or ibuprofen PRN  - Follow up as needed for new or worsening symptoms        *Explanation of diagnosis, treatment options and risk and benefits of medications reviewed with patient. Patient agrees with plan of care.  *All questions were answered.    *Red flags symptoms were discussed and patient was advised when they should return for reevaluation or for prompt emergency evaluation.   *Patient was given verbal and written instructions on plan of care. Instructions were printed or are available on [employer] on electronic AVS.   *We discussed potential side effects of any prescribed or recommended therapies, as well as expectations for response to treatments.  *Patient discharged in stable condition    Ed Del Real, GATITO, APRN, FNP-C  [facility]    SUBJECTIVE  CHIEF COMPLAINT/ REASON FOR VISIT  Patient presents with:  Cough     HISTORY OF PRESENT ILLNESS  Arie Hahn Jr. is a pleasant 58 year old male [facility] with cold symptoms.  Approximately 8 days ago patient experienced some sinus drainage and fatigue.  Over the course of the illness he developed a cough which is still present, and diarrhea which has resolved.  He  "has tried ibuprofen for symptom management.  He reports he is a  and is exposed to sick children frequently.  Today he feels like his cough is quite productive and has not improved.  Denies sinus pain or fullness.  History provided by patient      I have reviewed the nursing notes.  I have reviewed allergies, medication list, problem list, and past medical history.    REVIEW OF SYSTEMS  Review of Systems   Constitutional:  Positive for fatigue. Negative for chills and fever.   HENT:  Negative for ear pain, sinus pressure, sinus pain and sore throat.    Eyes: Negative.    Respiratory:  Positive for cough and chest tightness. Negative for shortness of breath.    Cardiovascular: Negative.    Gastrointestinal: Negative.    Endocrine: Negative.    Genitourinary: Negative.    Skin: Negative.         VITAL SIGNS  Vitals:    11/26/24 1835   BP: (!) 150/84   BP Location: Right arm   Patient Position: Sitting   Cuff Size: Adult Large   Pulse: 89   Resp: 16   Temp: 99.5  F (37.5  C)   TempSrc: Tympanic   SpO2: 94%   Weight: 145.1 kg (319 lb 12.8 oz)   Height: 1.778 m (5' 10\")      Body mass index is 45.89 kg/m .      OBJECTIVE  PHYSICAL EXAM  Physical Exam  Vitals and nursing note reviewed.   Constitutional:       General: He is not in acute distress.     Appearance: Normal appearance. He is normal weight. He is not ill-appearing, toxic-appearing or diaphoretic.   HENT:      Head: Normocephalic and atraumatic.      Right Ear: Tympanic membrane, ear canal and external ear normal. There is no impacted cerumen.      Left Ear: Tympanic membrane, ear canal and external ear normal. There is no impacted cerumen.      Nose: Congestion present. No rhinorrhea.      Mouth/Throat:      Mouth: Mucous membranes are moist.      Pharynx: Oropharynx is clear. No oropharyngeal exudate or posterior oropharyngeal erythema.   Eyes:      Extraocular Movements: Extraocular movements intact.      Conjunctiva/sclera: Conjunctivae normal.     "  Pupils: Pupils are equal, round, and reactive to light.   Cardiovascular:      Rate and Rhythm: Normal rate and regular rhythm.      Pulses: Normal pulses.      Heart sounds: Normal heart sounds. No murmur heard.  Pulmonary:      Effort: Pulmonary effort is normal. No respiratory distress.      Breath sounds: Normal breath sounds. No wheezing or rhonchi.   Musculoskeletal:      Cervical back: Normal range of motion and neck supple. No tenderness.   Lymphadenopathy:      Cervical: No cervical adenopathy.   Neurological:      Mental Status: He is alert.            DIAGNOSTICS  No results found for any visits on 11/26/24.

## 2024-11-27 NOTE — NURSING NOTE
"Patient presents to clinic for cough x1 week. Patient states he has also had symptoms of diarrhea.    Chief Complaint   Patient presents with    Cough       FOOD SECURITY SCREENING QUESTIONS  Hunger Vital Signs:  Within the past 12 months we worried whether our food would run out before we got money to buy more. Never  Within the past 12 months the food we bought just didn't last and we didn't have money to get more. Never  Sarai Wan 11/26/2024 6:38 PM      Initial BP (!) 150/84 (BP Location: Right arm, Patient Position: Sitting, Cuff Size: Adult Large)   Pulse 89   Temp 99.5  F (37.5  C) (Tympanic)   Resp 16   Ht 1.778 m (5' 10\")   Wt 145.1 kg (319 lb 12.8 oz)   SpO2 94%   BMI 45.89 kg/m   Estimated body mass index is 45.89 kg/m  as calculated from the following:    Height as of this encounter: 1.778 m (5' 10\").    Weight as of this encounter: 145.1 kg (319 lb 12.8 oz).  Medication Reconciliation: complete    Sarai Wan  "

## 2025-01-25 ENCOUNTER — OFFICE VISIT (OUTPATIENT)
Dept: FAMILY MEDICINE | Facility: OTHER | Age: 60
End: 2025-01-25
Attending: NURSE PRACTITIONER
Payer: COMMERCIAL

## 2025-01-25 ENCOUNTER — HOSPITAL ENCOUNTER (OUTPATIENT)
Dept: GENERAL RADIOLOGY | Facility: OTHER | Age: 60
Discharge: HOME | End: 2025-01-25
Payer: COMMERCIAL

## 2025-01-25 VITALS
DIASTOLIC BLOOD PRESSURE: 60 MMHG | BODY MASS INDEX: 45.1 KG/M2 | HEART RATE: 75 BPM | OXYGEN SATURATION: 97 % | WEIGHT: 315 LBS | RESPIRATION RATE: 18 BRPM | HEIGHT: 70 IN | TEMPERATURE: 97.8 F | SYSTOLIC BLOOD PRESSURE: 136 MMHG

## 2025-01-25 DIAGNOSIS — R05.8 PRODUCTIVE COUGH: ICD-10-CM

## 2025-01-25 DIAGNOSIS — J06.9 VIRAL URI WITH COUGH: Primary | ICD-10-CM

## 2025-01-25 PROCEDURE — 99213 OFFICE O/P EST LOW 20 MIN: CPT

## 2025-01-25 PROCEDURE — 71046 X-RAY EXAM CHEST 2 VIEWS: CPT

## 2025-01-25 RX ORDER — PREDNISONE 20 MG/1
40 TABLET ORAL DAILY
Qty: 10 TABLET | Refills: 0 | Status: SHIPPED | OUTPATIENT
Start: 2025-01-25 | End: 2025-01-30

## 2025-01-25 ASSESSMENT — PAIN SCALES - GENERAL: PAINLEVEL_OUTOF10: NO PAIN (0)

## 2025-01-25 NOTE — PROGRESS NOTES
ASSESSMENT/PLAN:    I have reviewed the nursing notes.  I have reviewed the findings, diagnosis, plan and need for follow up with the patient.    1. Viral URI with cough (Primary)  2. Productive cough  - XR Chest 2 Views  - predniSONE (DELTASONE) 20 MG tablet; Take 2 tablets (40 mg) by mouth daily for 5 days.  Dispense: 10 tablet; Refill: 0    Patient presents with persistent upper respiratory symptoms.  Patient's vitals are stable and he appears nontoxic.  Chest x-ray was clear. Discussed with patient viral vs bacterial respiratory illness, and evidence based practice and guidelines for cough without fever or infiltrate on xray are not indicative of pneumonia and should not be treated with antibiotics. Discussed with patient that symptoms and exam are consistent with viral illness.  Discussed that symptomatic treatment of cough is appropriate but not with antibiotics.  Will treat patient's persistent cough with a short burst of prednisone.  Advised patient to take this medication in the morning with food and avoid any NSAIDs. Discussed symptomatic treatment - Encouraged fluids, salt water gargles, honey (only if greater than 1 year in age due to risk of botulism), elevation, humidifier, sinus rinse/netti pot, lozenges, tea, topical vapor rub, popsicles, rest, etc. May use over-the-counter Tylenol or ibuprofen PRN.    Discussed warning signs/symptoms indicative of need to f/u    Follow up if symptoms persist or worsen or concerns    I explained my diagnostic considerations and recommendations to the patient, who voiced understanding and agreement with the treatment plan. All questions were answered. We discussed potential side effects of any prescribed or recommended therapies, as well as expectations for response to treatments.    Atif Butterfield, SAWYER CNP  1/25/2025  11:59 AM    HPI:    Arie FIDEL Hahn Jr. is a 59 year old male  who [facility] for concerns of cough    URI, x 10  days    Symptoms:  YES: +  fevers or chills - resolved  No sore throat/pharyngitis/tonsillitis.   YES: +  allergy/URI Symptoms  No muffled sounds/change in hearing  No sensation of fullness in ear(s)  No ringing in ears/tinnitus  No dizziness  YES: +  congestion (head/nasal/chest)  YES: +  cough/productive cough  YES: +  post nasal drip   YES: +  headache  No sinus pain/pressure  YES: +  myalgias - resolved  No otalgia  No rash  Activity Level Changes: Yes: fatigue  Appetite/Liquid Intake Changes: No  Changes to Bowel Habits: No  Changes to Bladder Habits: No  Additional Symptoms to Report: No  Prior workup: No    Treatments tried: Tylenol/Ibuprofen, Decongestants, OTC Cough med, Fluids, and Rest    Site of exposure: workplace  Type of exposure: flu  Patient believes he may have had influenza last week and is concerned that it is turning into pneumonia    Other Pertinent History: heart disease    Allergies: NKA    PCP: Ihsan    Past Medical History:   Diagnosis Date    Atrial fibrillation (H)     lone    Chronic atrial fibrillation (H) 4/21/2023    Essential hypertension, benign 4/21/2023    Torn meniscus     left knee     Past Surgical History:   Procedure Laterality Date    ARTHROSCOPY KNEE WITH LATERAL MENISCECTOMY Left 10/22/2014    Procedure: ARTHROSCOPY KNEE WITH LATERAL MENISCECTOMY;  Surgeon: Alok Zapien MD;  Location: [facility] OR    COLONOSCOPY  2020    5 year fu, due 2025    KNEE LIGAMENT RECONSTRUCTION Right     [facility] STOMACH SURGERY PROCEDURE UNLISTED      appe 16 yrs old    [facility] TOTAL KNEE ARTHROPLASTY      reconstuction     Social History     Tobacco Use    Smoking status: Never     Passive exposure: Never    Smokeless tobacco: Never   Substance Use Topics    Alcohol use: Yes     Alcohol/week: 7.0 standard drinks of alcohol     Types: 7 Standard drinks or equivalent per week     Comment: weekends     Current Outpatient Medications   Medication Sig Dispense Refill    acetaminophen  "(TYLENOL) 325 MG tablet Take 650 mg by mouth.      atenolol (TENORMIN) 50 MG tablet TAKE 1 TABLET BY MOUTH TWICE A  tablet 3    atorvastatin (LIPITOR) 10 MG tablet Take 1 tablet (10 mg) by mouth daily. 90 tablet 4    azithromycin (ZITHROMAX) 250 MG tablet Take 2 tablets (500mg) on day one, then take 1 tablet (250mg) for the next 4 days 6 tablet 0    diclofenac (VOLTAREN) 1 % GEL Place onto the skin 4 times daily Apply to left knee 4 times daily as needed, wash hands after application. 1 Tube 1    digoxin (LANOXIN) 250 MCG tablet Take 1 tablet (250 mcg) by mouth daily 90 tablet 4    enalapril (VASOTEC) 5 MG tablet TAKE 1 TABLET BY MOUTH TWICE A  tablet 3    hydrocortisone valerate (WEST-SINGH) 0.2 % external ointment Apply topically 2 times daily. 60 g 3    vitamin B-12 (CYANOCOBALAMIN) 250 MCG tablet Take 1,000 mcg by mouth daily.      VITAMIN D, CHOLECALCIFEROL, PO Take by mouth daily      zinc gluconate 50 MG tablet Take 50 mg by mouth daily       No Known Allergies  Past medical history, past surgical history, current medications and allergies reviewed and accurate to the best of my knowledge.      ROS:  Refer to HPI    /60 (BP Location: Left arm, Patient Position: Sitting, Cuff Size: Adult Regular)   Pulse 75   Temp 97.8  F (36.6  C) (Temporal)   Resp 18   Ht 1.778 m (5' 10\")   Wt (!) 145.2 kg (320 lb)   SpO2 97%   BMI 45.92 kg/m      EXAM:  General Appearance: Well appearing 59 year old male, appropriate appearance for age. No acute distress   Ears: Left TM intact, translucent with bony landmarks appreciated, no erythema, no effusion, no bulging, no purulence.  Right TM intact, translucent with bony landmarks appreciated, no erythema, no effusion, no bulging, no purulence.  Left auditory canal clear.  Right auditory canal clear.  Normal external ears, non tender.  Eyes: conjunctivae normal without erythema or irritation, corneas clear, no drainage or crusting, no eyelid swelling, " pupils equal   Oropharynx: moist mucous membranes, posterior pharynx without erythema, tonsils symmetric and 1+, no erythema, no exudates or petechiae, no post nasal drip seen, no trismus, voice clear.    Nose:  Bilateral nares: no erythema, no edema, no drainage or congestion   Neck: supple without adenopathy  Respiratory: normal chest wall and respirations.  Normal effort.  No wheezing, mild crackles throughout lung fields, no rhonchi.  No increased work of breathing.  Mild cough appreciated.  Cardiac: RRR with no murmurs  Musculoskeletal:  Equal movement of bilateral upper extremities.  Equal movement of bilateral lower extremities.  Normal gait.    Dermatological: no rashes noted of exposed skin  Neuro: Alert and oriented to person, place, and time.    Psychological: normal affect, alert, oriented, and pleasant.     Xray:  Results for orders placed or performed in visit on 01/25/25   XR Chest 2 Views     Status: None    Narrative    EXAM: XR CHEST 2 VIEWS  [facility]  DATE: 1/25/2025    INDICATION:  Productive cough  COMPARISON: None.      Impression    IMPRESSION: No focal consolidation or effusion. Mild bronchial wall thickening. Heart size is normal. No acute osseous findings.

## 2025-01-25 NOTE — NURSING NOTE
"Chief Complaint   Patient presents with    Cough     Persistent dry cough - sick a week ago        Initial /60 (BP Location: Left arm, Patient Position: Sitting, Cuff Size: Adult Regular)   Pulse 75   Temp 97.8  F (36.6  C) (Temporal)   Resp 18   Ht 1.778 m (5' 10\")   Wt (!) 145.2 kg (320 lb)   SpO2 97%   BMI 45.92 kg/m   Estimated body mass index is 45.92 kg/m  as calculated from the following:    Height as of this encounter: 1.778 m (5' 10\").    Weight as of this encounter: 145.2 kg (320 lb).  Medication Review: complete        Katie Coleman LPN      "

## 2025-01-27 NOTE — PROGRESS NOTES
Detail Level: Detailed HISTORY OF PRESENT ILLNESS  Mr. Hahn is a pleasant 56 year old year old male who presents to clinic today with   Arie explains that it has been a few months since his last injection (January) and his knee is beginning to hurt again, he wants to do another injection today  He also reports that his low back and pain that travelled into his legs has bothered him more  Location: left knee   Quality:  achy pain    Severity: 10/10 at worst    Duration: see above  Timing: occurs intermittently  Context: occurs while exercising and lifting  Modifying factors:  resting and non-use makes it better, movement and use makes it worse  Associated signs & symptoms: some effusion, radicular pain into legs    MEDICAL HISTORY  Patient Active Problem List   Diagnosis     Morbid obesity (H)     Left knee pain       Current Outpatient Medications   Medication Sig Dispense Refill     atenolol (TENORMIN) 50 MG tablet Take 1 tablet by mouth 2 times daily       ATENOLOL 50 MG OR TABS 1 tab twice daily       diclofenac (VOLTAREN) 1 % GEL Place onto the skin 4 times daily Apply to left knee 4 times daily as needed, wash hands after application. 1 Tube 1     diclofenac (VOLTAREN) 1 % topical gel Place 4 g onto the skin 4 times daily 1 Tube 1     digoxin (LANOXIN) 250 MCG tablet Take 1 tablet by mouth daily       DIGOXIN 0.25 MG OR TABS 1 TABLET DAILY       enalapril (VASOTEC) 5 MG tablet Take 5 mg by mouth       meloxicam (MOBIC) 15 MG tablet TAKE 1 TABLET BY MOUTH EVERY DAY 30 tablet 0     methylPREDNISolone (MEDROL) 4 MG tablet therapy pack Follow Package Directions 21 tablet 0     VASOTEC 5 MG OR TABS 1 tab twice daily       VITAMIN D, CHOLECALCIFEROL, PO Take by mouth daily         No Known Allergies    Family History   Problem Relation Age of Onset     Diabetes Mother      Hypertension Mother      Social History     Socioeconomic History     Marital status:    Tobacco Use     Smoking status: Never Smoker     Smokeless tobacco:  Detail Level: Zone Never Used   Substance and Sexual Activity     Alcohol use: Yes     Comment: weekends     Drug use: No       Additional medical/Social/Surgical histories reviewed in [facility] and updated as appropriate.     REVIEW OF SYSTEMS (7/20/2022)  10 point ROS of systems including Constitutional, Eyes, Respiratory, Cardiovascular, Gastroenterology, Genitourinary, Integumentary, Musculoskeletal, Psychiatric, Allergic/Immunologic were all negative except for pertinent positives noted in my HPI.     PHYSICAL EXAM  VSS  General  - normal appearance, in no obvious distress  HEENT  - conjunctivae not injected, moist mucous membranes, normocephalic/atraumatic head, ears normal appearance, no lesions, mouth normal appearance, no scars, normal dentition and teeth present  CV  - normal peripheral perfusion  Pulm  - normal respiratory pattern, non-labored  Musculoskeletal - lumbar spine  - stance: normal gait without limp, no obvious leg length discrepancy, normal heel and toe walk  - inspection: normal bone and joint alignment, no obvious scoliosis  - palpation: no paravertebral or bony tenderness  - ROM: flexion exacerbates pain, normal extension, sidebending, rotation  - strength: lower extremities 5/5 in all planes  - special tests:  (+) straight leg raise  (+) slump test  Neuro  - patellar and Achilles DTRs 2+ bilaterally, some lower extremity sensory deficit throughout L5 distribution, grossly normal coordination, normal muscle tone  Skin  - no ecchymosis, erythema, warmth, or induration, no obvious rash  Psych  - interactive, appropriate, normal mood and affect  Left knee: has pain with flexion, patellar compression, medial joint line ttp  ASSESSMENT & PLAN  57 yo male with left knee arthritis, lumbar ddd, disc herniations, radicular pain, worse    I independently reviewed the following imaging studies:  xrays knee: shows djd  Reviewed lumbar MRI: shows ddd, disc herniations  Discussed and ordered lumbar GWYN  After a 20 minute  discussion and examination, we decided to perform a same day injection for diagnostic and therapeutic purposes for left knee    Appropriate PPE was utilized for prevention of spread of Covid-19.  Kee Hood MD, [facility]  PROCEDURE:        left    Knee joint injection   The patient was apprised of the risks and the benefits of the procedure and consented and a written consent was signed.   The patient s  KNEE was sterilely prepped with chloraprep.   40 mg of triamcinolone suspension was drawn up into a 5 mL syringe with 4 mL of 1% lidocaine  The patient was injected into the knee with a 1.5-inch 22-gauge needle at the_superiorlateral aspect of their knee joint  There were no complications. The patient tolerated the procedure well. There was minimal bleeding.   The patient was instructed to ice the knee upon leaving clinic and refrain from overuse over the next 3 days.   The patient was instructed to go to the emergency room with any usual pain, swelling, or redness occurred in the injected area.   The patient was given a followup appointment to evaluate response to the injection to their increased range of motion and reduction of pain.      Large Joint Injection/Arthocentesis: L knee joint    Date/Time: 7/20/2022 8:11 AM  Performed by: Kee Hood MD  Authorized by: Kee Hood MD     Indications:  Pain, osteoarthritis and diagnostic evaluation  Needle Size:  22 G  Guidance: landmark guided    Approach:  Superolateral  Location:  Knee      Medications:  40 mg triamcinolone 40 MG/ML  Outcome:  Tolerated well, no immediate complications  Procedure discussed: discussed risks, benefits, and alternatives    Consent Given by:  Patient  Timeout: timeout called immediately prior to procedure    Prep: patient was prepped and draped in usual sterile fashion           Malignant neoplasm of overlapping sites of bladder

## 2025-05-06 ENCOUNTER — HOSPITAL ENCOUNTER (EMERGENCY)
Facility: OTHER | Age: 60
Discharge: HOME | End: 2025-05-06
Attending: PHYSICIAN ASSISTANT
Payer: COMMERCIAL

## 2025-05-06 VITALS
HEART RATE: 74 BPM | WEIGHT: 315 LBS | HEIGHT: 70 IN | DIASTOLIC BLOOD PRESSURE: 94 MMHG | TEMPERATURE: 99.5 F | SYSTOLIC BLOOD PRESSURE: 165 MMHG | BODY MASS INDEX: 45.1 KG/M2 | OXYGEN SATURATION: 98 % | RESPIRATION RATE: 20 BRPM

## 2025-05-06 DIAGNOSIS — L02.02 FURUNCLE OF CHIN: ICD-10-CM

## 2025-05-06 PROCEDURE — 99283 EMERGENCY DEPT VISIT LOW MDM: CPT | Performed by: PHYSICIAN ASSISTANT

## 2025-05-06 RX ORDER — SULFAMETHOXAZOLE AND TRIMETHOPRIM 800; 160 MG/1; MG/1
1 TABLET ORAL 2 TIMES DAILY
Qty: 14 TABLET | Refills: 0 | Status: SHIPPED | OUTPATIENT
Start: 2025-05-06

## 2025-05-06 ASSESSMENT — COLUMBIA-SUICIDE SEVERITY RATING SCALE - C-SSRS
2. HAVE YOU ACTUALLY HAD ANY THOUGHTS OF KILLING YOURSELF IN THE PAST MONTH?: NO
6. HAVE YOU EVER DONE ANYTHING, STARTED TO DO ANYTHING, OR PREPARED TO DO ANYTHING TO END YOUR LIFE?: NO
1. IN THE PAST MONTH, HAVE YOU WISHED YOU WERE DEAD OR WISHED YOU COULD GO TO SLEEP AND NOT WAKE UP?: NO

## 2025-05-06 ASSESSMENT — ACTIVITIES OF DAILY LIVING (ADL): ADLS_ACUITY_SCORE: 41

## 2025-05-07 ENCOUNTER — OFFICE VISIT (OUTPATIENT)
Dept: FAMILY MEDICINE | Facility: OTHER | Age: 60
End: 2025-05-07
Attending: FAMILY MEDICINE
Payer: COMMERCIAL

## 2025-05-07 VITALS
BODY MASS INDEX: 45.1 KG/M2 | WEIGHT: 315 LBS | OXYGEN SATURATION: 96 % | TEMPERATURE: 98 F | HEART RATE: 76 BPM | HEIGHT: 70 IN | DIASTOLIC BLOOD PRESSURE: 94 MMHG | RESPIRATION RATE: 18 BRPM | SYSTOLIC BLOOD PRESSURE: 146 MMHG

## 2025-05-07 DIAGNOSIS — L02.93 CARBUNCLE: Primary | ICD-10-CM

## 2025-05-07 RX ORDER — OMEGA-3 FATTY ACIDS/FISH OIL 300-1000MG
CAPSULE ORAL
COMMUNITY

## 2025-05-07 ASSESSMENT — ENCOUNTER SYMPTOMS
WOUND: 1
SORE THROAT: 0
VOICE CHANGE: 0
TROUBLE SWALLOWING: 0
FACIAL SWELLING: 1
SHORTNESS OF BREATH: 0
COLOR CHANGE: 1
FEVER: 0

## 2025-05-07 ASSESSMENT — PAIN SCALES - GENERAL: PAINLEVEL_OUTOF10: MILD PAIN (1)

## 2025-05-07 NOTE — PROGRESS NOTES
"  Assessment & Plan     Carbuncle  No appreciable area of fluctuance.  I do not think I&D would be beneficial.  He has noticed a decrease in size and redness since starting Bactrim and he is only taken 2 tablets.  Recommend completion of antibiotics and warm moist packs.  He is to update me prior to the weekend and if he thinks its gotten worse, we will get him into see surgery for I&D.          BMI  Estimated body mass index is 46.78 kg/m  as calculated from the following:    Height as of this encounter: 1.778 m (5' 10\").    Weight as of this encounter: 147.9 kg (326 lb).     Dominic Dorantes MD      Subjective   Viktor is a 59 year old, presenting for the following health issues:  Derm Problem (chin)     60 yo marble size bump on chin last Sunday, shaved the next day, got bigger and red  Seen in ER last night, was not I&D does there is no area of fluctuance.  Has started on Bactrim and has taken 2 doses.  He is also doing warm moist packs.  He thinks the redness is gone down.  No significant pain.  No fever.  No drainage.      History of Present Illness       Reason for visit:  Infection  Symptom onset:  1-3 days ago  Symptoms include:  Swelling and redness  Symptom intensity:  Moderate  Symptom progression:  Staying the same  Had these symptoms before:  No   He is taking medications regularly.                  Review of Systems  Constitutional, HEENT, cardiovascular, pulmonary, gi and gu systems are negative, except as otherwise noted.      Objective    BP (!) 146/94   Pulse 76   Temp 98  F (36.7  C) (Tympanic)   Resp 18   Ht 1.778 m (5' 10\")   Wt (!) 147.9 kg (326 lb)   SpO2 96%   BMI 46.78 kg/m    Body mass index is 46.78 kg/m .  Physical Exam   Examination of his chin reveals a 2-1/2 x 2 and half centimeter red carbuncle.  There is no palpable area of fluctuance.  It is not warm to the touch.  Not tender.  There is a scab in the middle.  Reports no drainage.            Signed Electronically by: Alem " MD Jayna

## 2025-05-07 NOTE — ED TRIAGE NOTES
"ED Nursing Triage Note (General)   ________________________________    Arie Hahn Jr. is a 59 year old Male that presents to triage via private vehicle with complaints of swelling on patients chin. Patient states he woke up Sunday and states he noted swelling and what looked like a \"pimple\" on his chin, however, patient states it was also hard and swollen at that time.  Since then swelling and redness has persisted and gotten worse.  Patients temp is 99.4 on arrival. No fevers reported at home.   Significant symptoms had onset 3 day(s) ago.  Vital signs:  Temp: 99.5  F (37.5  C) Temp src: Tympanic BP: (!) 165/94 Pulse: 74   Resp: 20 SpO2: 98 %     Height: 177.8 cm (5' 10\") Weight: (!) 145.2 kg (320 lb)  Estimated body mass index is 45.92 kg/m  as calculated from the following:    Height as of this encounter: 1.778 m (5' 10\").    Weight as of this encounter: 145.2 kg (320 lb).       PRE HOSPITAL PRIOR LIVING SITUATION Spouse      Triage Assessment (Adult)       Row Name 05/06/25 2670          Triage Assessment    Airway WDL WDL        Respiratory WDL    Respiratory WDL WDL        Skin Circulation/Temperature WDL    Skin Circulation/Temperature WDL X        Cardiac WDL    Cardiac WDL WDL     Cardiac Rhythm NSR        Peripheral/Neurovascular WDL    Peripheral Neurovascular WDL WDL        Cognitive/Neuro/Behavioral WDL    Cognitive/Neuro/Behavioral WDL WDL                     " Quality 226: Preventive Care And Screening: Tobacco Use: Screening And Cessation Intervention: Patient screened for tobacco use and is an ex/non-smoker Detail Level: Detailed

## 2025-05-07 NOTE — DISCHARGE INSTRUCTIONS
Get plenty of fluids and rest.  As discussed, I do think you have what is called a furuncle which is a skin infection, possibly from a ingrown hair.  We will treat you with antibiotics.  Keep the wound clean with soap and water.  You can use hot compresses.  Continue to follow-up with primary care as already scheduled for reassessment.  Should return if becoming more ill, increased fevers, difficulty moving neck, swelling etc.

## 2025-05-07 NOTE — NURSING NOTE
Patient is here for concerns of area on chin. States woke up Sunday with area red, swollen, sore. Did go to ER and started on antibiotics, they recommended to keep appt.       Medication Reconciliation: complete    Sendy Salazar LPN 5/7/2025 12:48 PM       Advance care directive on file? no  Advance care directive provided to patient? no       Sendy Salazar LPN

## 2025-05-07 NOTE — ED PROVIDER NOTES
"  History     Chief Complaint   Patient presents with    Wound Infection     HPI  Arie Hahn Jr. is a 59 year old male who presents to the ED for a wound infection. He presents to triage via private vehicle with complaints of swelling on patients chin. Patient states he woke up Sunday and states he noted swelling and what looked like a \"pimple\" on his chin, however, patient states it was also hard and swollen at that time.  Since then swelling and redness has persisted and gotten worse.  Patients temp is 99.4 on arrival. No fevers reported at home.   Significant symptoms had onset 3 day(s) ago.    Allergies:  No Known Allergies    Problem List:    Patient Active Problem List    Diagnosis Date Noted    DDD (degenerative disc disease), lumbar 08/28/2024     Priority: Medium    Lumbar facet arthropathy 08/28/2024     Priority: Medium    Spinal stenosis of lumbar region, unspecified whether neurogenic claudication present 08/28/2024     Priority: Medium    Foraminal stenosis of lumbar region 08/28/2024     Priority: Medium    Sacroiliitis 08/28/2024     Priority: Medium    Numbness and tingling of left leg 05/24/2024     Priority: Medium    Chronic midline low back pain without sciatica 05/07/2024     Priority: Medium    Segmental dysfunction of lumbar region 05/07/2024     Priority: Medium    Somatic dysfunction of pelvis region 05/07/2024     Priority: Medium    Status post total left knee replacement 07/27/2023     Priority: Medium    Chronic atrial fibrillation (H) 04/21/2023     Priority: Medium    AVNI (obstructive sleep apnea) - mild, not on CPAP 04/21/2023     Priority: Medium    Essential hypertension, benign 04/21/2023     Priority: Medium    Morbid obesity due to excess calories (H) 11/20/2019     Priority: Medium        Past Medical History:    Past Medical History:   Diagnosis Date    Atrial fibrillation (H)     Chronic atrial fibrillation (H) 4/21/2023    Essential hypertension, benign 4/21/2023    " Torn meniscus        Past Surgical History:    Past Surgical History:   Procedure Laterality Date    ARTHROSCOPY KNEE WITH LATERAL MENISCECTOMY Left 10/22/2014    Procedure: ARTHROSCOPY KNEE WITH LATERAL MENISCECTOMY;  Surgeon: Alok Zapien MD;  Location: [facility] OR    COLONOSCOPY  2020    5 year fu, due 2025    KNEE LIGAMENT RECONSTRUCTION Right     [facility]    [facility] STOMACH SURGERY PROCEDURE UNLISTED      appe 16 yrs old    [facility] TOTAL KNEE ARTHROPLASTY      reconstuction       Family History:    Family History   Problem Relation Age of Onset    Diabetes Mother     Hypertension Mother     Cancer Mother     Arrhythmia No family hx of     Anesthesia Reaction No family hx of     Bleeding Diathesis No family hx of        Social History:  Marital Status:   [2]  Social History     Tobacco Use    Smoking status: Never     Passive exposure: Never    Smokeless tobacco: Never   Vaping Use    Vaping status: Never Used   Substance Use Topics    Alcohol use: Yes     Alcohol/week: 7.0 standard drinks of alcohol     Types: 7 Standard drinks or equivalent per week     Comment: weekends    Drug use: No        Medications:    sulfamethoxazole-trimethoprim (BACTRIM DS) 800-160 MG tablet  acetaminophen (TYLENOL) 325 MG tablet  atenolol (TENORMIN) 50 MG tablet  atorvastatin (LIPITOR) 10 MG tablet  azithromycin (ZITHROMAX) 250 MG tablet  diclofenac (VOLTAREN) 1 % GEL  digoxin (LANOXIN) 250 MCG tablet  enalapril (VASOTEC) 5 MG tablet  hydrocortisone valerate (WEST-SINGH) 0.2 % external ointment  vitamin B-12 (CYANOCOBALAMIN) 250 MCG tablet  VITAMIN D, CHOLECALCIFEROL, PO  zinc gluconate 50 MG tablet          Review of Systems   Constitutional:  Negative for fever.   HENT:  Positive for facial swelling. Negative for dental problem, drooling, sore throat, trouble swallowing and voice change.    Respiratory:  Negative for shortness of breath.    Cardiovascular:  Negative for chest pain.   Skin:  Positive for color change and wound.  "      Physical Exam   BP: (!) 165/94  Pulse: 74  Temp: 99.5  F (37.5  C)  Resp: 20  Height: 177.8 cm (5' 10\")  Weight: (!) 145.2 kg (320 lb)  SpO2: 98 %      Physical Exam  Constitutional:       General: He is not in acute distress.     Appearance: He is well-developed. He is not ill-appearing, toxic-appearing or diaphoretic.   HENT:      Head: Normocephalic and atraumatic.      Mouth/Throat:      Comments: 2cm slightly swollen, erythematous area with central scabbing to left chin area  Cardiovascular:      Rate and Rhythm: Normal rate and regular rhythm.   Pulmonary:      Effort: Pulmonary effort is normal.      Breath sounds: Normal breath sounds.   Musculoskeletal:      Cervical back: Normal range of motion and neck supple. No tenderness.   Neurological:      Mental Status: He is alert. Mental status is at baseline.   Psychiatric:         Mood and Affect: Mood normal.         Behavior: Behavior normal.         ED Course        Procedures              Critical Care time:  none         No results found for this or any previous visit (from the past 24 hours).    Medications - No data to display    Assessments & Plan (with Medical Decision Making)   Nontoxic and in NAD. He does have 2cm slightly swollen, erythematous area with central scabbing to left chin area.  There is no Obvious area of fluctuance.  He has no intraoral signs of swelling.  Has full range of motion of his neck. No fluctuant areas in neck. VSS, afebrile.     Presentation is most consistent with a furuncle/boil, however it does not appear that I&D would be beneficial at this time.  He otherwise does not appear toxic and I have low suspicion for deep neck space infection, therefore no further workup indicated in ED tonight.  We will treat him with Bactrim at this time.  He is actually has a follow-up appointment with PCP tomorrow for reassessment.  He we will continue with supportive care including warm compresses.    Strict return precautions are " given to the pt, they will return if symptoms are worsening or concerning. The pt understands and agrees with the plan and they are discharged.     Je Mahoney PA-C    I have reviewed the findings, diagnosis, plan and need for follow up with the patient.          Discharge Medication List as of 5/6/2025 10:22 PM        START taking these medications    Details   sulfamethoxazole-trimethoprim (BACTRIM DS) 800-160 MG tablet Take 1 tablet by mouth 2 times daily., Disp-14 tablet, R-0, [facility]             Final diagnoses:   Furuncle of chin       5/6/2025   [facility] AND [facility]       Je Mahoney PA  05/07/25 [id number]

## 2025-05-29 DIAGNOSIS — I48.20 CHRONIC ATRIAL FIBRILLATION (H): ICD-10-CM

## 2025-06-02 RX ORDER — DIGOXIN 250 MCG
250 TABLET ORAL DAILY
Qty: 90 TABLET | Refills: 3 | Status: SHIPPED | OUTPATIENT
Start: 2025-06-02

## 2025-06-02 NOTE — TELEPHONE ENCOUNTER
[facility] in #[id number] in [facility] [place] sent Rx request for the following:      Requested Prescriptions   Pending Prescriptions Disp Refills    digoxin (LANOXIN) 250 MCG tablet [Pharmacy Med Name: DIGOXIN 250 MCG TABLET] 90 tablet 4     Sig: TAKE 1 TABLET BY MOUTH DAILY       Cardiac Glycoside Agents Protocol Failed - 6/2/2025 11:58 AM        Failed - Normal digoxin level on file in past 12 mos     Recent Labs   Lab Test 05/24/24  1132   DIGOXIN 0.8           Failed - GFR on file in the past 12 months        Failed - Recent (6 month) or future (90 days) visit with the authorizing provider's specialty (provided they have been seen in the past 9 months)     Last Prescription Date:   5/24/24  Last Fill Qty/Refills:         90, R-4    Last Office Visit:              5/7/25   Future Office visit:           None    Unable to complete prescription refill per RN Medication Refill Policy. Sarai eBrumen, Refill RN .............. 6/2/2025  1:26 PM

## 2025-07-29 ENCOUNTER — PATIENT OUTREACH (OUTPATIENT)
Dept: CARE COORDINATION | Facility: CLINIC | Age: 60
End: 2025-07-29
Payer: COMMERCIAL

## 2025-08-26 DIAGNOSIS — I10 ESSENTIAL HYPERTENSION, BENIGN: ICD-10-CM

## 2025-08-26 DIAGNOSIS — I48.20 CHRONIC ATRIAL FIBRILLATION (H): ICD-10-CM

## 2025-08-28 RX ORDER — ENALAPRIL MALEATE 5 MG/1
5 TABLET ORAL 2 TIMES DAILY
Qty: 180 TABLET | Refills: 0 | Status: SHIPPED | OUTPATIENT
Start: 2025-08-28

## 2025-08-28 RX ORDER — ATENOLOL 50 MG/1
50 TABLET ORAL 2 TIMES DAILY
Qty: 180 TABLET | Refills: 0 | Status: SHIPPED | OUTPATIENT
Start: 2025-08-28